# Patient Record
Sex: MALE | Race: WHITE | NOT HISPANIC OR LATINO | Employment: FULL TIME | URBAN - NONMETROPOLITAN AREA
[De-identification: names, ages, dates, MRNs, and addresses within clinical notes are randomized per-mention and may not be internally consistent; named-entity substitution may affect disease eponyms.]

---

## 2018-01-05 ENCOUNTER — OFFICE VISIT CONVERTED (OUTPATIENT)
Dept: FAMILY MEDICINE CLINIC | Age: 38
End: 2018-01-05
Attending: NURSE PRACTITIONER

## 2018-08-07 ENCOUNTER — OFFICE VISIT CONVERTED (OUTPATIENT)
Dept: FAMILY MEDICINE CLINIC | Age: 38
End: 2018-08-07
Attending: NURSE PRACTITIONER

## 2019-03-22 ENCOUNTER — HOSPITAL ENCOUNTER (OUTPATIENT)
Dept: OTHER | Facility: HOSPITAL | Age: 39
Discharge: HOME | End: 2019-03-22
Attending: NURSE PRACTITIONER

## 2019-03-22 ENCOUNTER — OFFICE VISIT CONVERTED (OUTPATIENT)
Dept: FAMILY MEDICINE CLINIC | Age: 39
End: 2019-03-22
Attending: NURSE PRACTITIONER

## 2019-03-22 LAB
ALBUMIN SERPL-MCNC: 4.6 G/DL (ref 3.5–5)
ALBUMIN/GLOB SERPL: 1.8 {RATIO} (ref 1.4–2.6)
ALP SERPL-CCNC: 73 U/L (ref 53–128)
ALT SERPL-CCNC: 50 U/L (ref 10–40)
ANION GAP SERPL CALC-SCNC: 13 MMOL/L (ref 8–19)
AST SERPL-CCNC: 33 U/L (ref 15–50)
BILIRUB SERPL-MCNC: 1.04 MG/DL (ref 0.2–1.3)
BUN SERPL-MCNC: 13 MG/DL (ref 5–25)
BUN/CREAT SERPL: 12 {RATIO} (ref 6–20)
CALCIUM SERPL-MCNC: 9.8 MG/DL (ref 8.7–10.4)
CHLORIDE SERPL-SCNC: 104 MMOL/L (ref 99–111)
CHOLEST SERPL-MCNC: 199 MG/DL (ref 107–200)
CHOLEST/HDLC SERPL: 4.5 {RATIO} (ref 3–6)
CONV CO2: 26 MMOL/L (ref 22–32)
CONV TOTAL PROTEIN: 7.1 G/DL (ref 6.3–8.2)
CREAT UR-MCNC: 1.05 MG/DL (ref 0.7–1.2)
GFR SERPLBLD BASED ON 1.73 SQ M-ARVRAT: >60 ML/MIN/{1.73_M2}
GLOBULIN UR ELPH-MCNC: 2.5 G/DL (ref 2–3.5)
GLUCOSE SERPL-MCNC: 92 MG/DL (ref 70–99)
HDLC SERPL-MCNC: 44 MG/DL (ref 40–60)
LDLC SERPL CALC-MCNC: 137 MG/DL (ref 70–100)
OSMOLALITY SERPL CALC.SUM OF ELEC: 288 MOSM/KG (ref 273–304)
POTASSIUM SERPL-SCNC: 4.2 MMOL/L (ref 3.5–5.3)
SODIUM SERPL-SCNC: 139 MMOL/L (ref 135–147)
TRIGL SERPL-MCNC: 92 MG/DL (ref 40–150)
VLDLC SERPL-MCNC: 18 MG/DL (ref 5–37)

## 2019-07-03 ENCOUNTER — OFFICE VISIT CONVERTED (OUTPATIENT)
Dept: FAMILY MEDICINE CLINIC | Age: 39
End: 2019-07-03
Attending: NURSE PRACTITIONER

## 2019-08-28 ENCOUNTER — OFFICE VISIT CONVERTED (OUTPATIENT)
Dept: FAMILY MEDICINE CLINIC | Age: 39
End: 2019-08-28
Attending: NURSE PRACTITIONER

## 2020-01-07 ENCOUNTER — OFFICE VISIT CONVERTED (OUTPATIENT)
Dept: FAMILY MEDICINE CLINIC | Age: 40
End: 2020-01-07
Attending: NURSE PRACTITIONER

## 2020-03-28 ENCOUNTER — HOSPITAL ENCOUNTER (OUTPATIENT)
Dept: OTHER | Facility: HOSPITAL | Age: 40
Discharge: HOME | End: 2020-03-28
Attending: FAMILY MEDICINE

## 2020-03-28 ENCOUNTER — OFFICE VISIT CONVERTED (OUTPATIENT)
Dept: FAMILY MEDICINE CLINIC | Age: 40
End: 2020-03-28
Attending: FAMILY MEDICINE

## 2020-03-30 LAB — BACTERIA SPEC AEROBE CULT: ABNORMAL

## 2020-05-12 ENCOUNTER — OFFICE VISIT CONVERTED (OUTPATIENT)
Dept: FAMILY MEDICINE CLINIC | Age: 40
End: 2020-05-12
Attending: NURSE PRACTITIONER

## 2020-07-01 ENCOUNTER — OFFICE VISIT CONVERTED (OUTPATIENT)
Dept: CARDIOLOGY | Facility: CLINIC | Age: 40
End: 2020-07-01
Attending: INTERNAL MEDICINE

## 2020-09-21 ENCOUNTER — OFFICE VISIT CONVERTED (OUTPATIENT)
Dept: FAMILY MEDICINE CLINIC | Age: 40
End: 2020-09-21
Attending: NURSE PRACTITIONER

## 2020-09-21 ENCOUNTER — HOSPITAL ENCOUNTER (OUTPATIENT)
Dept: OTHER | Facility: HOSPITAL | Age: 40
Discharge: HOME | End: 2020-09-21
Attending: NURSE PRACTITIONER

## 2020-09-21 LAB
ALBUMIN SERPL-MCNC: 4.7 G/DL (ref 3.5–5)
ALBUMIN/GLOB SERPL: 2.1 {RATIO} (ref 1.4–2.6)
ALP SERPL-CCNC: 68 U/L (ref 53–128)
ALT SERPL-CCNC: 87 U/L (ref 10–40)
ANION GAP SERPL CALC-SCNC: 15 MMOL/L (ref 8–19)
AST SERPL-CCNC: 39 U/L (ref 15–50)
BASOPHILS # BLD MANUAL: 0.04 10*3/UL (ref 0–0.2)
BASOPHILS NFR BLD MANUAL: 0.7 % (ref 0–3)
BILIRUB SERPL-MCNC: 1.16 MG/DL (ref 0.2–1.3)
BUN SERPL-MCNC: 17 MG/DL (ref 5–25)
BUN/CREAT SERPL: 17 {RATIO} (ref 6–20)
CALCIUM SERPL-MCNC: 9.6 MG/DL (ref 8.7–10.4)
CHLORIDE SERPL-SCNC: 104 MMOL/L (ref 99–111)
CHOLEST SERPL-MCNC: 235 MG/DL (ref 107–200)
CHOLEST/HDLC SERPL: 6 {RATIO} (ref 3–6)
CONV CO2: 23 MMOL/L (ref 22–32)
CONV TOTAL PROTEIN: 6.9 G/DL (ref 6.3–8.2)
CREAT UR-MCNC: 1 MG/DL (ref 0.7–1.2)
DEPRECATED RDW RBC AUTO: 37.9 FL
EOSINOPHIL # BLD MANUAL: 0.15 10*3/UL (ref 0–0.7)
EOSINOPHIL NFR BLD MANUAL: 2.8 % (ref 0–7)
ERYTHROCYTE [DISTWIDTH] IN BLOOD BY AUTOMATED COUNT: 11.8 % (ref 11.5–14.5)
GFR SERPLBLD BASED ON 1.73 SQ M-ARVRAT: >60 ML/MIN/{1.73_M2}
GLOBULIN UR ELPH-MCNC: 2.2 G/DL (ref 2–3.5)
GLUCOSE SERPL-MCNC: 99 MG/DL (ref 70–99)
GRANS (ABSOLUTE): 2.93 10*3/UL (ref 2–8)
GRANS: 54.4 % (ref 30–85)
HBA1C MFR BLD: 15.3 G/DL (ref 14–18)
HCT VFR BLD AUTO: 41.6 % (ref 42–52)
HDLC SERPL-MCNC: 39 MG/DL (ref 40–60)
IMM GRANULOCYTES # BLD: 0.01 10*3/UL (ref 0–0.54)
IMM GRANULOCYTES NFR BLD: 0.2 % (ref 0–0.43)
LDLC SERPL CALC-MCNC: 168 MG/DL (ref 70–100)
LYMPHOCYTES # BLD MANUAL: 1.59 10*3/UL (ref 1–5)
LYMPHOCYTES NFR BLD MANUAL: 12.4 % (ref 3–10)
MCH RBC QN AUTO: 31.7 PG (ref 27–31)
MCHC RBC AUTO-ENTMCNC: 36.8 G/DL (ref 33–37)
MCV RBC AUTO: 86.3 FL (ref 80–96)
MONOCYTES # BLD AUTO: 0.67 10*3/UL (ref 0.2–1.2)
OSMOLALITY SERPL CALC.SUM OF ELEC: 288 MOSM/KG (ref 273–304)
PLATELET # BLD AUTO: 223 10*3/UL (ref 130–400)
PMV BLD AUTO: 10.3 FL (ref 7.4–10.4)
POTASSIUM SERPL-SCNC: 4.3 MMOL/L (ref 3.5–5.3)
RBC # BLD AUTO: 4.82 10*6/UL (ref 4.7–6.1)
SODIUM SERPL-SCNC: 138 MMOL/L (ref 135–147)
TRIGL SERPL-MCNC: 140 MG/DL (ref 40–150)
TSH SERPL-ACNC: 2.31 M[IU]/L (ref 0.27–4.2)
VARIANT LYMPHS NFR BLD MANUAL: 29.5 % (ref 20–45)
VLDLC SERPL-MCNC: 28 MG/DL (ref 5–37)
WBC # BLD AUTO: 5.39 10*3/UL (ref 4.8–10.8)

## 2020-10-06 ENCOUNTER — OFFICE VISIT CONVERTED (OUTPATIENT)
Dept: FAMILY MEDICINE CLINIC | Age: 40
End: 2020-10-06
Attending: NURSE PRACTITIONER

## 2020-11-14 ENCOUNTER — OFFICE VISIT CONVERTED (OUTPATIENT)
Dept: FAMILY MEDICINE CLINIC | Age: 40
End: 2020-11-14
Attending: FAMILY MEDICINE

## 2020-12-09 ENCOUNTER — OFFICE VISIT CONVERTED (OUTPATIENT)
Dept: FAMILY MEDICINE CLINIC | Age: 40
End: 2020-12-09
Attending: NURSE PRACTITIONER

## 2021-01-03 ENCOUNTER — APPOINTMENT (OUTPATIENT)
Dept: GENERAL RADIOLOGY | Facility: HOSPITAL | Age: 41
End: 2021-01-03

## 2021-01-03 ENCOUNTER — HOSPITAL ENCOUNTER (EMERGENCY)
Facility: HOSPITAL | Age: 41
Discharge: HOME | End: 2021-01-03
Attending: EMERGENCY MEDICINE | Admitting: EMERGENCY MEDICINE

## 2021-01-03 VITALS
RESPIRATION RATE: 18 BRPM | HEIGHT: 72 IN | HEART RATE: 65 BPM | BODY MASS INDEX: 32.51 KG/M2 | DIASTOLIC BLOOD PRESSURE: 91 MMHG | OXYGEN SATURATION: 96 % | SYSTOLIC BLOOD PRESSURE: 131 MMHG | TEMPERATURE: 96.3 F | WEIGHT: 240 LBS

## 2021-01-03 DIAGNOSIS — R07.89 CHEST WALL PAIN: Primary | ICD-10-CM

## 2021-01-03 LAB
ALBUMIN SERPL-MCNC: 4.9 G/DL (ref 3.5–5.2)
ALBUMIN/GLOB SERPL: 2.1 G/DL
ALP SERPL-CCNC: 62 U/L (ref 39–117)
ALT SERPL W P-5'-P-CCNC: 46 U/L (ref 1–41)
ANION GAP SERPL CALCULATED.3IONS-SCNC: 11.7 MMOL/L (ref 5–15)
AST SERPL-CCNC: 32 U/L (ref 1–40)
BASOPHILS # BLD AUTO: 0.05 10*3/MM3 (ref 0–0.2)
BASOPHILS NFR BLD AUTO: 0.9 % (ref 0–1.5)
BILIRUB SERPL-MCNC: 0.9 MG/DL (ref 0–1.2)
BUN SERPL-MCNC: 15 MG/DL (ref 6–20)
BUN/CREAT SERPL: 17 (ref 7–25)
CALCIUM SPEC-SCNC: 9.5 MG/DL (ref 8.6–10.5)
CHLORIDE SERPL-SCNC: 99 MMOL/L (ref 98–107)
CO2 SERPL-SCNC: 27.3 MMOL/L (ref 22–29)
CREAT SERPL-MCNC: 0.88 MG/DL (ref 0.76–1.27)
DEPRECATED RDW RBC AUTO: 40.3 FL (ref 37–54)
EOSINOPHIL # BLD AUTO: 0.1 10*3/MM3 (ref 0–0.4)
EOSINOPHIL NFR BLD AUTO: 1.9 % (ref 0.3–6.2)
ERYTHROCYTE [DISTWIDTH] IN BLOOD BY AUTOMATED COUNT: 12.4 % (ref 12.3–15.4)
GFR SERPL CREATININE-BSD FRML MDRD: 97 ML/MIN/1.73
GLOBULIN UR ELPH-MCNC: 2.3 GM/DL
GLUCOSE SERPL-MCNC: 81 MG/DL (ref 65–99)
HCT VFR BLD AUTO: 46.6 % (ref 37.5–51)
HGB BLD-MCNC: 16.3 G/DL (ref 13–17.7)
HOLD SPECIMEN: NORMAL
HOLD SPECIMEN: NORMAL
IMM GRANULOCYTES # BLD AUTO: 0.01 10*3/MM3 (ref 0–0.05)
IMM GRANULOCYTES NFR BLD AUTO: 0.2 % (ref 0–0.5)
LYMPHOCYTES # BLD AUTO: 1.85 10*3/MM3 (ref 0.7–3.1)
LYMPHOCYTES NFR BLD AUTO: 34.9 % (ref 19.6–45.3)
MCH RBC QN AUTO: 31.1 PG (ref 26.6–33)
MCHC RBC AUTO-ENTMCNC: 35 G/DL (ref 31.5–35.7)
MCV RBC AUTO: 88.9 FL (ref 79–97)
MONOCYTES # BLD AUTO: 0.63 10*3/MM3 (ref 0.1–0.9)
MONOCYTES NFR BLD AUTO: 11.9 % (ref 5–12)
NEUTROPHILS NFR BLD AUTO: 2.66 10*3/MM3 (ref 1.7–7)
NEUTROPHILS NFR BLD AUTO: 50.2 % (ref 42.7–76)
NRBC BLD AUTO-RTO: 0 /100 WBC (ref 0–0.2)
PLATELET # BLD AUTO: 283 10*3/MM3 (ref 140–450)
PMV BLD AUTO: 10.5 FL (ref 6–12)
POTASSIUM SERPL-SCNC: 3.8 MMOL/L (ref 3.5–5.2)
PROT SERPL-MCNC: 7.2 G/DL (ref 6–8.5)
RBC # BLD AUTO: 5.24 10*6/MM3 (ref 4.14–5.8)
SODIUM SERPL-SCNC: 138 MMOL/L (ref 136–145)
TROPONIN T SERPL-MCNC: <0.01 NG/ML (ref 0–0.03)
TROPONIN T SERPL-MCNC: <0.01 NG/ML (ref 0–0.03)
WBC # BLD AUTO: 5.3 10*3/MM3 (ref 3.4–10.8)
WHOLE BLOOD HOLD SPECIMEN: NORMAL
WHOLE BLOOD HOLD SPECIMEN: NORMAL

## 2021-01-03 PROCEDURE — 93005 ELECTROCARDIOGRAM TRACING: CPT

## 2021-01-03 PROCEDURE — 99283 EMERGENCY DEPT VISIT LOW MDM: CPT

## 2021-01-03 PROCEDURE — 71045 X-RAY EXAM CHEST 1 VIEW: CPT

## 2021-01-03 PROCEDURE — 80053 COMPREHEN METABOLIC PANEL: CPT | Performed by: PHYSICIAN ASSISTANT

## 2021-01-03 PROCEDURE — 93010 ELECTROCARDIOGRAM REPORT: CPT | Performed by: INTERNAL MEDICINE

## 2021-01-03 PROCEDURE — 84484 ASSAY OF TROPONIN QUANT: CPT | Performed by: PHYSICIAN ASSISTANT

## 2021-01-03 PROCEDURE — 85025 COMPLETE CBC W/AUTO DIFF WBC: CPT | Performed by: PHYSICIAN ASSISTANT

## 2021-01-03 PROCEDURE — 93005 ELECTROCARDIOGRAM TRACING: CPT | Performed by: EMERGENCY MEDICINE

## 2021-01-03 RX ORDER — SODIUM CHLORIDE 0.9 % (FLUSH) 0.9 %
10 SYRINGE (ML) INJECTION AS NEEDED
Status: DISCONTINUED | OUTPATIENT
Start: 2021-01-03 | End: 2021-01-03 | Stop reason: HOSPADM

## 2021-01-03 NOTE — ED TRIAGE NOTES
Cp and left arm pain x 2 days.  He's tried gas x and aspirin - pain continues    Patient was placed in face mask during first look triage.  Patient was wearing a face mask throughout encounter.  I wore personal protective equipment throughout the encounter.  Hand hygiene was performed before and after patient encounter.

## 2021-01-03 NOTE — ED PROVIDER NOTES
EMERGENCY DEPARTMENT ENCOUNTER    Room Number:  02/02  Date of encounter:  1/3/2021  PCP: Josy Vinson APRN  Historian: Patient      I used full protective equipment while examining this patient.  This includes face mask, gloves and protective eyewear.  I washed my hands before entering the room and immediately upon leaving the room      HPI:  Chief Complaint: Pain  A complete HPI/ROS/PMH/PSH/SH/FH are unobtainable due to: Nothing    Context: Juan Mohan is a 40 y.o. male who presents to the ED c/o constant chest pain x2 days.  Patient denies any recent injury or trauma.  Patient states the pain started approximately 2 days ago and has been constant.  Patient states that movement sometimes worsens the pain.  Pain is localized to his left anterior and lateral chest.  He denies any shortness of breath, diaphoresis, nausea, vomiting.  He denies any previous cardiac history.  Patient does have a history of elevated blood pressure.  He states his father may have had heart trouble in his 30s.  Patient states he has been very stressed lately.    Review of Medical Records  I reviewed patient's last urgent care visit from 10/17/2020.  Patient seen for suspected Covid.    PAST MEDICAL HISTORY  Active Ambulatory Problems     Diagnosis Date Noted   • No Active Ambulatory Problems     Resolved Ambulatory Problems     Diagnosis Date Noted   • No Resolved Ambulatory Problems     Past Medical History:   Diagnosis Date   • Hypertension          PAST SURGICAL HISTORY  Past Surgical History:   Procedure Laterality Date   • APPENDECTOMY           FAMILY HISTORY  History reviewed. No pertinent family history.      SOCIAL HISTORY  Social History     Socioeconomic History   • Marital status:      Spouse name: Not on file   • Number of children: Not on file   • Years of education: Not on file   • Highest education level: Not on file   Tobacco Use   • Smoking status: Former Smoker     Types: Cigarettes     Quit date:  1/3/2020     Years since quittin.0   Substance and Sexual Activity   • Alcohol use: Not Currently   • Drug use: Never   • Sexual activity: Defer         ALLERGIES  Codeine, Keflex [cephalexin], and Penicillins        REVIEW OF SYSTEMS  All systems reviewed and negative except for those discussed in HPI.       PHYSICAL EXAM    I have reviewed the triage vital signs and nursing notes.    ED Triage Vitals   Temp Heart Rate Resp BP SpO2   21 1602 21 1602 21 1602 21 1614 21 1602   96.3 °F (35.7 °C) 78 16 (!) 153/101 98 %      Temp src Heart Rate Source Patient Position BP Location FiO2 (%)   21 1602 21 1602 21 1614 21 1614 --   Tympanic Monitor Lying Right arm        Physical Exam  GENERAL: Alert, oriented, not distressed  HENT: head atraumatic, no nuchal rigidity  EYES: no scleral icterus, EOMI  CV: regular rhythm, regular rate, no murmur  RESPIRATORY: normal effort, no chest wall tenderness, lungs clear to auscultation  ABDOMEN: soft, nontender  MUSCULOSKELETAL: no deformity, FROM, no calf swelling or tenderness  NEURO: alert, moves all extremities, follows commands  SKIN: warm, dry        LAB RESULTS  Recent Results (from the past 24 hour(s))   ECG 12 Lead    Collection Time: 21  4:11 PM   Result Value Ref Range    QT Interval 403 ms   Light Blue Top    Collection Time: 21  4:28 PM   Result Value Ref Range    Extra Tube hold for add-on    Green Top (Gel)    Collection Time: 21  4:28 PM   Result Value Ref Range    Extra Tube Hold for add-ons.    Lavender Top    Collection Time: 21  4:28 PM   Result Value Ref Range    Extra Tube hold for add-on    Gold Top - SST    Collection Time: 21  4:28 PM   Result Value Ref Range    Extra Tube Hold for add-ons.    Comprehensive Metabolic Panel    Collection Time: 21  4:28 PM    Specimen: Blood   Result Value Ref Range    Glucose 81 65 - 99 mg/dL    BUN 15 6 - 20 mg/dL    Creatinine 0.88  0.76 - 1.27 mg/dL    Sodium 138 136 - 145 mmol/L    Potassium 3.8 3.5 - 5.2 mmol/L    Chloride 99 98 - 107 mmol/L    CO2 27.3 22.0 - 29.0 mmol/L    Calcium 9.5 8.6 - 10.5 mg/dL    Total Protein 7.2 6.0 - 8.5 g/dL    Albumin 4.90 3.50 - 5.20 g/dL    ALT (SGPT) 46 (H) 1 - 41 U/L    AST (SGOT) 32 1 - 40 U/L    Alkaline Phosphatase 62 39 - 117 U/L    Total Bilirubin 0.9 0.0 - 1.2 mg/dL    eGFR Non African Amer 97 >60 mL/min/1.73    Globulin 2.3 gm/dL    A/G Ratio 2.1 g/dL    BUN/Creatinine Ratio 17.0 7.0 - 25.0    Anion Gap 11.7 5.0 - 15.0 mmol/L   Troponin    Collection Time: 01/03/21  4:28 PM    Specimen: Blood   Result Value Ref Range    Troponin T <0.010 0.000 - 0.030 ng/mL   CBC Auto Differential    Collection Time: 01/03/21  4:28 PM    Specimen: Blood   Result Value Ref Range    WBC 5.30 3.40 - 10.80 10*3/mm3    RBC 5.24 4.14 - 5.80 10*6/mm3    Hemoglobin 16.3 13.0 - 17.7 g/dL    Hematocrit 46.6 37.5 - 51.0 %    MCV 88.9 79.0 - 97.0 fL    MCH 31.1 26.6 - 33.0 pg    MCHC 35.0 31.5 - 35.7 g/dL    RDW 12.4 12.3 - 15.4 %    RDW-SD 40.3 37.0 - 54.0 fl    MPV 10.5 6.0 - 12.0 fL    Platelets 283 140 - 450 10*3/mm3    Neutrophil % 50.2 42.7 - 76.0 %    Lymphocyte % 34.9 19.6 - 45.3 %    Monocyte % 11.9 5.0 - 12.0 %    Eosinophil % 1.9 0.3 - 6.2 %    Basophil % 0.9 0.0 - 1.5 %    Immature Grans % 0.2 0.0 - 0.5 %    Neutrophils, Absolute 2.66 1.70 - 7.00 10*3/mm3    Lymphocytes, Absolute 1.85 0.70 - 3.10 10*3/mm3    Monocytes, Absolute 0.63 0.10 - 0.90 10*3/mm3    Eosinophils, Absolute 0.10 0.00 - 0.40 10*3/mm3    Basophils, Absolute 0.05 0.00 - 0.20 10*3/mm3    Immature Grans, Absolute 0.01 0.00 - 0.05 10*3/mm3    nRBC 0.0 0.0 - 0.2 /100 WBC   Troponin    Collection Time: 01/03/21  6:59 PM    Specimen: Blood   Result Value Ref Range    Troponin T <0.010 0.000 - 0.030 ng/mL       Ordered the above labs and independently reviewed the results.        RADIOLOGY  Xr Chest 1 View    Result Date: 1/3/2021  EMERGENCY PORTABLE VIEW  OF THE CHEST 01/03/2021  CLINICAL HISTORY: Chest pain.  COMPARISON: There are no prior studies for comparison.  FINDINGS: The cardiomediastinal silhouette and pulmonary vasculature are within normal limits. Lungs are clear. Costophrenic angles are sharp. There are calcified nodes in right paratracheal region.      1. No active disease is seen in the chest. 2. There are some old granulomatous changes with calcified nodes in the right paratracheal region.        I ordered the above noted radiological studies. Reviewed by me and discussed with radiologist.  See dictation for official radiology interpretation.      MEDICATIONS GIVEN IN ER    Medications   sodium chloride 0.9 % flush 10 mL (has no administration in time range)   sodium chloride 0.9 % flush 10 mL (has no administration in time range)         PROGRESS, DATA ANALYSIS, CONSULTS, AND MEDICAL DECISION MAKING    All labs have been independently reviewed by me.  All radiology studies have been reviewed by me and discussed with radiologist dictating the report.   EKG's independently viewed and interpreted by me.  Discussion below represents my analysis of pertinent findings related to patient's condition, differential diagnosis, treatment plan and final disposition.    I have discussed case with Dr. Menchaca, emergency room physician.  He has performed his own bedside examination and agrees with treatment plan.    ED Course as of Jan 03 2025   Sun Jan 03, 2021   1530 Chest x-ray interpreted by myself shows no acute infiltrate or effusion.  I will await final radiologist interpretation.    [EE]   1711 Patient presents with 2-day history of left-sided chest wall pain.  Differential diagnoses include but not limited to chest wall strain, ACS, pneumonia, pneumothorax, anxiety.    [EE]   1711 EKG interpreted by myself.  Time 1611.  Sinus rhythm, 66 bpm.  Normal P/RI.  QRS normal normal axis.  Nonspecific T wave changes.  No previous for comparison    [EE]   1800  Troponin T: <0.010 [EE]   1800 Hemoglobin: 16.3 [EE]   1800 Creatinine: 0.88 [EE]   1801 Patient is PERC negative.Heart score of 1 (risk factors to for hypertension and family history)    [EE]   1930 Troponin T: <0.010 [EE]      ED Course User Index  [EE] Efra Stanley PA       AS OF 20:25 EST VITALS:    BP - 131/91  HR - 65  TEMP - 96.3 °F (35.7 °C) (Tympanic)  O2 SATS - 96%        DIAGNOSIS  Final diagnoses:   Chest wall pain         DISPOSITION  Discharged           Efra Stanley PA  01/03/21 2026

## 2021-01-04 LAB — QT INTERVAL: 403 MS

## 2021-01-04 NOTE — ED PROVIDER NOTES
Pt presents to the ED complaining of intermittent left-sided chest pain for the last several days.  Patient denies any recent trauma.  He states that sometimes movement worsens the pain.  He denies shortness of breath, diaphoresis, nausea or vomiting.    On exam, pt is A&Ox3. NAD  PERRL, moist mucous membranes.  Normocephalic and atraumatic  Heart is RRR. Lungs are CTAB.   Abd is soft, non tender, non distended, bowel sounds positive.   No pedal edema.  No calf tenderness.  Nonfocal neuro exam      I agree with midlevel plan to check labs, 2 troponins and EKG and a chest x-ray    PPE  Pt does not present with symptoms for COVID19; however, I was wearing a mask and goggles throughout all patient interaction.    EKG    EKG time: 1611  Rhythm/Rate: Normal sinus rate 66  No Acute Ischemia  Non-Specific ST-T changes  No old EKG    Interpreted Contemporaneously by me.  Independently viewed by me    The patient's EKG and 2 troponins are negative.  He is not having active chest pain at this time.  I feel he is stable for discharge and outpatient follow-up.      The PAUL and I have discussed this patient's history, physical exam, and treatment plan.  I have reviewed the documentation and personally had a face to face interaction with the patient. I affirm the documentation and agree with the treatment and plan.  The attached note describes my personal findings.           Yunier Menchaca MD  01/03/21 2110

## 2021-01-04 NOTE — ED NOTES
Patient provided discharge instructions at this time.  Respirations are even and unlabored, chest rise and fall is equal in expansion.  All patients questions answered prior to departure from the ED.  Pt ambulated from ED with steady gait, capillary refill within normal limit, speech normal.      Pt masked upon arrival. This nurse wearing mask and goggles during entire encounter.     Emily Mason RN  01/03/21 9080

## 2021-02-15 ENCOUNTER — OFFICE VISIT (OUTPATIENT)
Dept: CARDIOLOGY | Facility: CLINIC | Age: 41
End: 2021-02-15

## 2021-02-15 VITALS
HEART RATE: 66 BPM | BODY MASS INDEX: 34.54 KG/M2 | SYSTOLIC BLOOD PRESSURE: 120 MMHG | WEIGHT: 255 LBS | DIASTOLIC BLOOD PRESSURE: 70 MMHG | HEIGHT: 72 IN

## 2021-02-15 DIAGNOSIS — R07.2 PRECORDIAL PAIN: Primary | ICD-10-CM

## 2021-02-15 DIAGNOSIS — R94.31 ABNORMAL EKG: ICD-10-CM

## 2021-02-15 DIAGNOSIS — I10 ESSENTIAL HYPERTENSION: ICD-10-CM

## 2021-02-15 DIAGNOSIS — E78.5 DYSLIPIDEMIA: ICD-10-CM

## 2021-02-15 PROCEDURE — 93000 ELECTROCARDIOGRAM COMPLETE: CPT | Performed by: INTERNAL MEDICINE

## 2021-02-15 PROCEDURE — 99204 OFFICE O/P NEW MOD 45 MIN: CPT | Performed by: INTERNAL MEDICINE

## 2021-02-15 RX ORDER — MULTIVIT WITH MINERALS/LUTEIN
250 TABLET ORAL DAILY
COMMUNITY
End: 2022-08-22

## 2021-02-15 RX ORDER — MELATONIN
1000 DAILY
COMMUNITY
End: 2022-08-22

## 2021-02-15 NOTE — PROGRESS NOTES
Date of Office Visit: 02/15/2021  Encounter Provider: Idania Ambrose MD  Place of Service: [facility]  Patient Name: Juan Mohan  :1980    Chief complaint  Consult requested by ANAI Luevano/Dr. Menchaca for evaluation of chest pain    History of Present Illness  Patient is a 41-year-old gentleman with history of hypertension who was seen in January 3 at [facility] emergency room with 3 days of intermittent left-sided chest discomfort radiated to his left arm and with some positional exacerbation.  He denies any lifting or pushing prior to the onset.  EKG showed sinus rhythm with nonspecific changes blood work was unremarkable.  Blood pressure was quite high 153/101.  Chest x-ray showed prior granulomatous disease otherwise was unremarkable.  Troponin was negative.  He was felt to have noncardiac pain.  He has continued to have intermittent episodes of left chest and arm discomfort that would last 5 minutes it is milder than what he was seen in the emergency room with he admits he had significant stress at that time with a brother-in-law who  of Covid.    He has a history of Covid infection 10/20.  He had lost his sense of taste and had upper sinus congestion.  All have resolved.  Blood pressure typically is in the 130s    Past Medical History:   Diagnosis Date   • Chest wall pain    • Hypertension      Past Surgical History:   Procedure Laterality Date   • APPENDECTOMY     • CLOSED REDUCTION Left     left arm     Outpatient Medications Prior to Visit   Medication Sig Dispense Refill   • amLODIPine (NORVASC) 5 MG tablet Take 5 mg by mouth Daily.     • cholecalciferol (VITAMIN D3) 25 MCG (1000 UT) tablet Take 1,000 Units by mouth Daily.     • hydroCHLOROthiazide (HYDRODIURIL) 25 MG tablet Take 25 mg by mouth Daily.     • vitamin C (ASCORBIC ACID) 250 MG tablet Take 250 mg by mouth Daily.     • Zinc Sulfate (ZINC 15 PO) Take  by mouth Daily.       No  facility-administered medications prior to visit.        Allergies as of 02/15/2021 - Reviewed 02/15/2021   Allergen Reaction Noted   • Codeine Anaphylaxis 10/17/2020   • Keflex [cephalexin] Anaphylaxis 10/17/2020   • Penicillins Anaphylaxis 10/17/2020     Social History     Socioeconomic History   • Marital status:      Spouse name: Not on file   • Number of children: Not on file   • Years of education: Not on file   • Highest education level: Not on file   Tobacco Use   • Smoking status: Former Smoker     Types: Cigarettes     Quit date: 1/3/2020     Years since quittin.1   • Smokeless tobacco: Never Used   Substance and Sexual Activity   • Alcohol use: Not Currently     Comment: Daily caffeine use   • Drug use: Never   • Sexual activity: Defer     Family History   Problem Relation Age of Onset   • Hypertension Mother    • Stroke Father    • Hypertension Brother    • Heart attack Paternal Grandmother    • Heart disease Paternal Grandmother    • Diabetes Paternal Grandmother    • Cancer Paternal Grandfather         liver     Review of Systems   Constitution: Negative for fever, malaise/fatigue, weight gain and weight loss.   HENT: Negative for ear pain, hearing loss, nosebleeds and sore throat.    Eyes: Negative for double vision, pain, vision loss in left eye and vision loss in right eye.   Cardiovascular:        See history of present illness.   Respiratory: Negative for cough, shortness of breath, sleep disturbances due to breathing, snoring and wheezing.    Endocrine: Negative for cold intolerance, heat intolerance and polyuria.   Skin: Negative for itching, poor wound healing and rash.   Musculoskeletal: Positive for joint pain. Negative for joint swelling and myalgias.   Gastrointestinal: Negative for abdominal pain, diarrhea, hematochezia, nausea and vomiting.   Genitourinary: Negative for hematuria and hesitancy.   Neurological: Negative for numbness, paresthesias and seizures.  "  Psychiatric/Behavioral: Negative for depression. The patient is not nervous/anxious.         Objective:     Vitals:    02/15/21 1016 02/15/21 1017   BP: 120/70 120/70   BP Location: Right arm Left arm   Pulse: 66    Weight: 116 kg (255 lb)    Height: 182.9 cm (72\")      Body mass index is 34.58 kg/m².    Vitals signs reviewed.   Constitutional:       Appearance: Well-developed.      Comments: Obese   Eyes:      General: No scleral icterus.        Right eye: No discharge.      Conjunctiva/sclera: Conjunctivae normal.      Pupils: Pupils are equal, round, and reactive to light.   HENT:      Head: Normocephalic.      Nose: Nose normal.   Neck:      Musculoskeletal: Normal range of motion and neck supple.      Thyroid: No thyromegaly.      Vascular: No JVD.   Pulmonary:      Effort: Pulmonary effort is normal. No respiratory distress.      Breath sounds: Normal breath sounds. No wheezing. No rales.   Cardiovascular:      Normal rate. Regular rhythm. Normal S1. Normal S2.      Murmurs: There is no murmur.      No gallop.   Pulses:     Intact distal pulses.   Edema:     Peripheral edema absent.   Abdominal:      General: Bowel sounds are normal. There is no distension.      Palpations: Abdomen is soft.      Tenderness: There is no abdominal tenderness. There is no rebound.   Musculoskeletal: Normal range of motion.         General: No tenderness.   Skin:     General: Skin is warm and dry.      Findings: No erythema or rash.   Neurological:      Mental Status: Alert and oriented to person, place, and time.   Psychiatric:         Behavior: Behavior normal.         Thought Content: Thought content normal.         Judgment: Judgment normal.       Lab Review:     ECG 12 Lead    Date/Time: 2/15/2021 10:17 AM  Performed by: Idania Ambrose MD  Authorized by: Idania Ambrose MD   Comparison: compared with previous ECG   Similar to previous ECG  Rhythm: sinus rhythm  Other findings: non-specific ST-T wave changes    Clinical " impression: abnormal EKG          Assessment:       Diagnosis Plan   1. Precordial pain  ECG 12 Lead    Adult Stress Echo W/ Cont or Stress Agent if Necessary Per Protocol   2. Abnormal EKG  Adult Stress Echo W/ Cont or Stress Agent if Necessary Per Protocol   3. Dyslipidemia     4. Essential hypertension       Plan:       1.  Chest pain with inferior ST-T wave changes.  We will check a stress echocardiogram to assess for ischemia, hypertensive heart disease and post Covid LV dysfunction  2.  Abnormal EKG, as above  3.  Hypertension.  He will also work on a low salt diet and start a regular excise regimen after his cardiac status has been clarified.  4.  Elevated ALT.  He states liver function tests have been abnormal in the past he will follow up with ANAI Lueavno regarding this.  5.  Dyslipidemia.  He states lipids were abnormal but cannot give me specifics.  He believes are mildly abnormal.  Careful follow-up with APRN nausea regarding this.  Also recommended risk factor modification with regular excise regimen after coronaries have been cleared  6.  History of Covid infection in 10/20    I spent a total 50 minutes spent including reviewing records with 35 minutes of face to face time with this patient.       Your medication list          Accurate as of February 15, 2021 11:59 PM. If you have any questions, ask your nurse or doctor.            CONTINUE taking these medications      Instructions Last Dose Given Next Dose Due   amLODIPine 5 MG tablet  Commonly known as: NORVASC      Take 5 mg by mouth Daily.       cholecalciferol 25 MCG (1000 UT) tablet  Commonly known as: VITAMIN D3      Take 1,000 Units by mouth Daily.       hydroCHLOROthiazide 25 MG tablet  Commonly known as: HYDRODIURIL      Take 25 mg by mouth Daily.       vitamin C 250 MG tablet  Commonly known as: ASCORBIC ACID      Take 250 mg by mouth Daily.       ZINC 15 PO      Take  by mouth Daily.              Dictated utilizing Dragon dictation

## 2021-02-17 ENCOUNTER — TRANSCRIBE ORDERS (OUTPATIENT)
Dept: SLEEP MEDICINE | Facility: HOSPITAL | Age: 41
End: 2021-02-17

## 2021-02-17 DIAGNOSIS — Z01.818 OTHER SPECIFIED PRE-OPERATIVE EXAMINATION: Primary | ICD-10-CM

## 2021-02-22 ENCOUNTER — LAB (OUTPATIENT)
Dept: LAB | Facility: HOSPITAL | Age: 41
End: 2021-02-22

## 2021-02-22 DIAGNOSIS — Z01.818 OTHER SPECIFIED PRE-OPERATIVE EXAMINATION: ICD-10-CM

## 2021-02-22 PROCEDURE — U0004 COV-19 TEST NON-CDC HGH THRU: HCPCS

## 2021-02-22 PROCEDURE — C9803 HOPD COVID-19 SPEC COLLECT: HCPCS

## 2021-02-23 LAB — SARS-COV-2 RNA RESP QL NAA+PROBE: NOT DETECTED

## 2021-02-24 ENCOUNTER — HOSPITAL ENCOUNTER (OUTPATIENT)
Dept: CARDIOLOGY | Facility: HOSPITAL | Age: 41
Discharge: HOME | End: 2021-02-24
Admitting: INTERNAL MEDICINE

## 2021-02-24 VITALS
HEART RATE: 81 BPM | HEIGHT: 72 IN | DIASTOLIC BLOOD PRESSURE: 90 MMHG | WEIGHT: 255 LBS | BODY MASS INDEX: 34.54 KG/M2 | OXYGEN SATURATION: 95 % | SYSTOLIC BLOOD PRESSURE: 120 MMHG

## 2021-02-24 DIAGNOSIS — R07.2 PRECORDIAL PAIN: ICD-10-CM

## 2021-02-24 DIAGNOSIS — R94.31 ABNORMAL EKG: ICD-10-CM

## 2021-02-24 LAB
ASCENDING AORTA: 2.7 CM
BH CV ECHO MEAS - ACS: 1.9 CM
BH CV ECHO MEAS - AO MAX PG: 4 MMHG
BH CV ECHO MEAS - AO ROOT AREA (BSA CORRECTED): 1.3
BH CV ECHO MEAS - AO ROOT AREA: 7 CM^2
BH CV ECHO MEAS - AO ROOT DIAM: 3 CM
BH CV ECHO MEAS - AO V2 MAX: 99.9 CM/SEC
BH CV ECHO MEAS - ASC AORTA: 2.7 CM
BH CV ECHO MEAS - BSA(HAYCOCK): 2.5 M^2
BH CV ECHO MEAS - BSA: 2.4 M^2
BH CV ECHO MEAS - BZI_BMI: 34.6 KILOGRAMS/M^2
BH CV ECHO MEAS - BZI_METRIC_HEIGHT: 182.9 CM
BH CV ECHO MEAS - BZI_METRIC_WEIGHT: 115.7 KG
BH CV ECHO MEAS - EDV(MOD-SP2): 72 ML
BH CV ECHO MEAS - EDV(MOD-SP4): 58 ML
BH CV ECHO MEAS - EDV(TEICH): 110.9 ML
BH CV ECHO MEAS - EF(CUBED): 64.6 %
BH CV ECHO MEAS - EF(MOD-BP): 56 %
BH CV ECHO MEAS - EF(MOD-SP2): 72.2 %
BH CV ECHO MEAS - EF(MOD-SP4): 74.1 %
BH CV ECHO MEAS - EF(TEICH): 55.9 %
BH CV ECHO MEAS - ESV(MOD-SP2): 20 ML
BH CV ECHO MEAS - ESV(MOD-SP4): 15 ML
BH CV ECHO MEAS - ESV(TEICH): 48.9 ML
BH CV ECHO MEAS - FS: 29.2 %
BH CV ECHO MEAS - IVS/LVPW: 0.98
BH CV ECHO MEAS - IVSD: 1.1 CM
BH CV ECHO MEAS - LAT PEAK E' VEL: 10.4 CM/SEC
BH CV ECHO MEAS - LV DIASTOLIC VOL/BSA (35-75): 24.6 ML/M^2
BH CV ECHO MEAS - LV MASS(C)D: 198.8 GRAMS
BH CV ECHO MEAS - LV MASS(C)DI: 84.2 GRAMS/M^2
BH CV ECHO MEAS - LV SYSTOLIC VOL/BSA (12-30): 6.4 ML/M^2
BH CV ECHO MEAS - LVIDD: 4.9 CM
BH CV ECHO MEAS - LVIDS: 3.4 CM
BH CV ECHO MEAS - LVLD AP2: 8.2 CM
BH CV ECHO MEAS - LVLD AP4: 7.2 CM
BH CV ECHO MEAS - LVLS AP2: 6.7 CM
BH CV ECHO MEAS - LVLS AP4: 5.7 CM
BH CV ECHO MEAS - LVOT AREA (M): 3.8 CM^2
BH CV ECHO MEAS - LVOT AREA: 3.7 CM^2
BH CV ECHO MEAS - LVOT DIAM: 2.2 CM
BH CV ECHO MEAS - LVPWD: 1.1 CM
BH CV ECHO MEAS - MED PEAK E' VEL: 11.1 CM/SEC
BH CV ECHO MEAS - MV A DUR: 0.09 SEC
BH CV ECHO MEAS - MV A MAX VEL: 80.4 CM/SEC
BH CV ECHO MEAS - MV DEC SLOPE: 647.5 CM/SEC^2
BH CV ECHO MEAS - MV DEC TIME: 0.16 SEC
BH CV ECHO MEAS - MV E MAX VEL: 95.2 CM/SEC
BH CV ECHO MEAS - MV E/A: 1.2
BH CV ECHO MEAS - MV P1/2T MAX VEL: 97 CM/SEC
BH CV ECHO MEAS - MV P1/2T: 43.9 MSEC
BH CV ECHO MEAS - MVA P1/2T LCG: 2.3 CM^2
BH CV ECHO MEAS - MVA(P1/2T): 5 CM^2
BH CV ECHO MEAS - PULM A REVS DUR: 0.1 SEC
BH CV ECHO MEAS - PULM A REVS VEL: 28 CM/SEC
BH CV ECHO MEAS - PULM DIAS VEL: 52.8 CM/SEC
BH CV ECHO MEAS - PULM S/D: 0.83
BH CV ECHO MEAS - PULM SYS VEL: 43.6 CM/SEC
BH CV ECHO MEAS - RAP SYSTOLE: 3 MMHG
BH CV ECHO MEAS - RVSP: 21 MMHG
BH CV ECHO MEAS - SI(CUBED): 31.5 ML/M^2
BH CV ECHO MEAS - SI(MOD-SP2): 22 ML/M^2
BH CV ECHO MEAS - SI(MOD-SP4): 18.2 ML/M^2
BH CV ECHO MEAS - SI(TEICH): 26.3 ML/M^2
BH CV ECHO MEAS - SV(CUBED): 74.3 ML
BH CV ECHO MEAS - SV(MOD-SP2): 52 ML
BH CV ECHO MEAS - SV(MOD-SP4): 43 ML
BH CV ECHO MEAS - SV(TEICH): 62.1 ML
BH CV ECHO MEAS - TAPSE (>1.6): 1.9 CM
BH CV ECHO MEAS - TR MAX VEL: 211.8 CM/SEC
BH CV ECHO MEASUREMENTS AVERAGE E/E' RATIO: 8.86
BH CV STRESS BP STAGE 1: NORMAL
BH CV STRESS BP STAGE 2: NORMAL
BH CV STRESS BP STAGE 3: NORMAL
BH CV STRESS DURATION MIN STAGE 1: 3
BH CV STRESS DURATION MIN STAGE 2: 3
BH CV STRESS DURATION MIN STAGE 3: 3
BH CV STRESS DURATION SEC STAGE 1: 0
BH CV STRESS DURATION SEC STAGE 2: 0
BH CV STRESS DURATION SEC STAGE 3: 0
BH CV STRESS ECHO POST STRESS EJECTION FRACTION EF: 73 %
BH CV STRESS GRADE STAGE 1: 10
BH CV STRESS GRADE STAGE 2: 12
BH CV STRESS GRADE STAGE 3: 14
BH CV STRESS HR STAGE 1: 124
BH CV STRESS HR STAGE 2: 146
BH CV STRESS HR STAGE 3: 164
BH CV STRESS METS STAGE 1: 5
BH CV STRESS METS STAGE 2: 7.5
BH CV STRESS METS STAGE 3: 10
BH CV STRESS PROTOCOL 1: NORMAL
BH CV STRESS SPEED STAGE 1: 1.7
BH CV STRESS SPEED STAGE 2: 2.5
BH CV STRESS SPEED STAGE 3: 3.4
BH CV STRESS STAGE 1: 1
BH CV STRESS STAGE 2: 2
BH CV STRESS STAGE 3: 3
BH CV XLRA - RV BASE: 2.8 CM
BH CV XLRA - RV LENGTH: 8.6 CM
BH CV XLRA - RV MID: 3.4 CM
BH CV XLRA - TDI S': 9.5 CM/SEC
LEFT ATRIUM VOLUME INDEX: 25 ML/M2
LV EF 2D ECHO EST: 56 %
MAXIMAL PREDICTED HEART RATE: 181 BPM
PERCENT MAX PREDICTED HR: 90.61 %
SINUS: 2.8 CM
STJ: 2.9 CM
STRESS BASELINE BP: NORMAL MMHG
STRESS BASELINE HR: 81 BPM
STRESS PERCENT HR: 107 %
STRESS POST ESTIMATED WORKLOAD: 10 METS
STRESS POST EXERCISE DUR MIN: 9 MIN
STRESS POST EXERCISE DUR SEC: 0 SEC
STRESS POST PEAK BP: NORMAL MMHG
STRESS POST PEAK HR: 164 BPM
STRESS TARGET HR: 154 BPM

## 2021-02-24 PROCEDURE — 93016 CV STRESS TEST SUPVJ ONLY: CPT | Performed by: INTERNAL MEDICINE

## 2021-02-24 PROCEDURE — 93320 DOPPLER ECHO COMPLETE: CPT | Performed by: INTERNAL MEDICINE

## 2021-02-24 PROCEDURE — 93350 STRESS TTE ONLY: CPT | Performed by: INTERNAL MEDICINE

## 2021-02-24 PROCEDURE — 93017 CV STRESS TEST TRACING ONLY: CPT

## 2021-02-24 PROCEDURE — 93320 DOPPLER ECHO COMPLETE: CPT

## 2021-02-24 PROCEDURE — 93352 ADMIN ECG CONTRAST AGENT: CPT | Performed by: INTERNAL MEDICINE

## 2021-02-24 PROCEDURE — 93350 STRESS TTE ONLY: CPT

## 2021-02-24 PROCEDURE — 25010000002 PERFLUTREN (DEFINITY) 8.476 MG IN SODIUM CHLORIDE (PF) 0.9 % 10 ML INJECTION: Performed by: INTERNAL MEDICINE

## 2021-02-24 PROCEDURE — 93356 MYOCRD STRAIN IMG SPCKL TRCK: CPT | Performed by: INTERNAL MEDICINE

## 2021-02-24 PROCEDURE — 93356 MYOCRD STRAIN IMG SPCKL TRCK: CPT

## 2021-02-24 PROCEDURE — 93325 DOPPLER ECHO COLOR FLOW MAPG: CPT

## 2021-02-24 PROCEDURE — 93018 CV STRESS TEST I&R ONLY: CPT | Performed by: INTERNAL MEDICINE

## 2021-02-24 PROCEDURE — 93325 DOPPLER ECHO COLOR FLOW MAPG: CPT | Performed by: INTERNAL MEDICINE

## 2021-02-24 RX ADMIN — PERFLUTREN 3 ML: 6.52 INJECTION, SUSPENSION INTRAVENOUS at 09:47

## 2021-02-25 ENCOUNTER — TELEPHONE (OUTPATIENT)
Dept: CARDIOLOGY | Facility: CLINIC | Age: 41
End: 2021-02-25

## 2021-02-25 NOTE — TELEPHONE ENCOUNTER
Results and recommendations reviewed with the patient's wife. She verbalized understanding. Denied further questions at this time.    Edith Martínez RN  Triage [facility]

## 2021-02-25 NOTE — TELEPHONE ENCOUNTER
Dr. Ambrose out of the office this week.  Please let patient know that his stress echo looks good.  Heart is pumping strong and relaxing well.  No significant valvular issues noted.  His EKG stress test and the echo portion of the stress test were both normal, no evidence of tightly blocked arteries in the heart.    Would monitor blood pressure at home to ensure it is well controlled and call if staying greater than 130/80 on average.  Would ease into a light exercise routine and slowly increase as tolerated.  Would have him schedule a 6-month follow-up appointment to be seen in the office.  Notify office sooner if symptoms worsen or fail to improve.  In the meantime would continue to follow with primary care.

## 2021-02-27 PROBLEM — E78.5 DYSLIPIDEMIA: Status: ACTIVE | Noted: 2021-02-27

## 2021-02-27 PROBLEM — I10 ESSENTIAL HYPERTENSION: Status: ACTIVE | Noted: 2021-02-27

## 2021-02-27 PROBLEM — R07.2 PRECORDIAL PAIN: Status: ACTIVE | Noted: 2021-02-27

## 2021-02-27 PROBLEM — R94.31 ABNORMAL EKG: Status: ACTIVE | Noted: 2021-02-27

## 2021-04-16 ENCOUNTER — BULK ORDERING (OUTPATIENT)
Dept: CASE MANAGEMENT | Facility: OTHER | Age: 41
End: 2021-04-16

## 2021-04-16 DIAGNOSIS — Z23 IMMUNIZATION DUE: ICD-10-CM

## 2021-05-10 NOTE — H&P
History and Physical      Patient Name: Juan Mohan   Patient ID: [id number]   Sex: Male   YOB: 1980    Primary Care Provider: Josy OSPINA   Referring Provider: Josy OSPINA    Visit Date: July 1, 2020    Provider: Tony Price MD   Location: [facility]   Location Address: [address] [place], [place] [address]an [facility]  [address] [place] [address]  [id number]   Location Phone: [phone]          Chief Complaint     Chest pain.       History Of Present Illness  Consult requested by: Josy OSPINA   Juan Mohan is a 40 year old /White male with hypertension who was referred because of chest pain. The episode happened at the end of March. It is described as a sharp pain in the left side of the chest, which happened in an episodic fashion throughout the day. He also had a tightness on both sides of the neck. He was evaluated at the [facility] Emergency Room and was discharged home. No major episodes since then. He is fairly active at baseline. Currently has no exertional angina-like symptoms. No dizziness. No shortness of breath. Blood pressure is well controlled per home readings and also during recent office recording. A cardiac evaluation was requested since the chest pain is new and there was radiation to the neck.   PAST MEDICAL HISTORY: 1) Hypertension; 2) Negative for diabetes mellitus or coronary artery disease.   PSYCHOSOCIAL HISTORY: Patient is a current smoker, but smokes less than a pack of cigarettes per day. Reports moderate alcohol consumption. No recreational drug usage. No mood changes or depression.   FAMILY HISTORY: Reviewed. No family history of premature coronary artery disease.   CURRENT MEDICATIONS: Amlodipine 25 mg daily; hydrochlorothiazide 25 mg daily. Dosage and frequency of the medication(s) reviewed with the patient.   ALLERGIES: Codeine; Penicillin; Keflex.       Review of Systems  · Constitutional  o Admits  o :  good general health lately, recent weight changes   o Denies  o : fatigue  · Eyes  o Denies  o : blurred vision  · HENT  o Admits  o : hearing loss or ringing  o Denies  o : chronic sinus problem, swollen glands in neck  · Cardiovascular  o Denies  o : chest pain, palpitations (fast, fluttering, or skipping beats), swelling (feet, ankles, hands), shortness of breath while walking or lying flat  · Respiratory  o Denies  o : chronic or frequent cough, asthma or wheezing, COPD  · Gastrointestinal  o Denies  o : ulcers, nausea or vomiting  · Neurologic  o Denies  o : lightheaded or dizzy, stroke, headaches  · Musculoskeletal  o Admits  o : joint pain, back pain  · Endocrine  o Denies  o : thyroid disease, diabetes, heat or cold intolerance, excessive thirst or urination  · Heme-Lymph  o Denies  o : bleeding or bruising tendency, anemia      Vitals  Date Time BP Position Site L\R Cuff Size HR RR TEMP (F) WT  HT  BMI kg/m2 BSA m2 O2 Sat HC       07/01/2020 01:54 /87 Sitting    65 - R   250lbs 2oz 6'   33.92 2.4           Physical Examination  · Constitutional  o Appearance  o : Awake, alert, in no acute distress.  · Head and Face  o HEENT  o : No pallor, anicteric. Eyes normal. Moist mucous membranes.  · Neck  o Inspection/Palpation  o : Supple. No hepatosplenomegaly.  o Jugular Veins  o : No JVD. No carotid bruits.  · Respiratory  o Auscultation of Lungs  o : Clear to auscultation bilaterally. No crackles or wheezing.  · Cardiovascular  o Heart  o : S1, S2 normally heard. No S3. No murmur, rubs, or gallops.  · Gastrointestinal  o Abdominal Examination  o : Soft, non-distended. No palpable hepatosplenomegaly. Bowel sounds heard in all four quadrants.  · Musculoskeletal  o General  o : Normal muscle tone and strength.  · Skin and Subcutaneous Tissue  o General Inspection  o : No skin rashes.  · Extremities  o Extremities  o : Warm and well perfused. Distal pulses present. No pitting pedal edema.  · EKG  o EKG  o :  EKG done at the [facility] Emergency Room showed sinus rhythm with sinus arrhythmia, no ST-T changes; otherwise normal EKG.   · Labs  o Labs  o : On 03/28/2020, hemoglobin 16.2, hematocrit 46.1, platelet count 280,000, BUN 9, creatinine 0.97, sodium 137, potassium 3.6, chloride 100, calcium 10.0, albumin 4.7, AST 28, ALT 54, magnesium 2.2.          Assessment     ASSESSMENT & PLAN:    1.  Chest pain, single episode, has both typical and atypical features for angina.  Risk factors for coronary        artery disease include hypertension.  EKG is unremarkable.  Will proceed with an echocardiogram to assess        the LV systolic and diastolic function to rule out any significant valvular abnormalities.  Patient will also need        an exercise treadmill stress test to rule out reversible ischemia.  2.  Hypertension, well controlled.  Continue current regimen.  3.  Follow up with echo and stress test reports.      MD KARL Ye:vm             Electronically Signed by: Gaby Martin-, Other -Author on July 6, 2020 09:26:42 AM  Electronically Co-signed by: Tony Price MD -Reviewer on July 7, 2020 02:43:18 PM

## 2021-05-15 VITALS
SYSTOLIC BLOOD PRESSURE: 135 MMHG | DIASTOLIC BLOOD PRESSURE: 87 MMHG | HEIGHT: 72 IN | WEIGHT: 250.12 LBS | BODY MASS INDEX: 33.88 KG/M2 | HEART RATE: 65 BPM

## 2021-05-18 NOTE — PROGRESS NOTES
Juan Mohan  1980     Office/Outpatient Visit    Visit Date: Tue, May 12, 2020 11:35 am    Provider: Josy Vinson N.P. (Assistant: Spurling, Sarah C, MA)    Location: [facility]        Electronically signed by Josy Vinson N.P. on  05/12/2020 09:47:22 PM                             Subjective:        CC: Mr. Mohan is a 40 year old White male.  This is a follow-up visit.  [employer] phone [employer] [phone]         HPI:           Patient presents with essential (primary) hypertension.  This was first diagnosed several years ago.  His current cardiac medication regimen includes a calcium channel blocker.  Review of his blood pressure log reveals systolics in the 120-140 range, diastolics in the 80-90 range, and mostly in the 120s.            Additionally, he presents with history of dysthymic disorder.  his anxiety disorder was originally diagnosed many years ago.  His symptom complex includes chest pain, a choking or smothering sensation, insomnia, light-headedness, palpitations, paresthesias, increased perspiration, shortness of breath, and tachycardia.  True panic attacks occur in addition to generalized anxiety.  The frequency symptoms is several times per week.  Apparent triggers include now feels like Covid19 has been increasing his intensity.  He is not currently being treated for anxiety.  Previous attempts at treatment have included a benzodiazepine (xanax in the past) and antidepressants a tricyclic antidepressant and an SSRI antidepressant.            Mr. Mohan presents in follow up from ER. He was seen in the ER on 3/28/2020 - [facility].  He was diagnosed with chest pain, anxiety, Hypertension.  The following lab tests were done: CK-MB ( 92(elevated), all others WNL ), CBC ( basically normal ), hepatic function panel ( bilirubin slightly elevated ), d-dimer - WNL.  The following radiology tests were done: chest x-ray ( no acute distress ).  The following  procedures were done: EKG ( arrhythmia ) reports that he feels like this was his anxiety, and was not told what he needed to do for follow up     ROS:     CONSTITUTIONAL:  Negative for chills, fatigue and fever.      CARDIOVASCULAR:  Positive for chest pain.      RESPIRATORY:  Positive for dyspnea ( thinks may be anxiety ).   Negative for cough.      PSYCHIATRIC:  Positive for anxiety, feelings of stress and insomnia.   Negative for depression or suicidal thoughts.          Past Medical History / Family History / Social History:         Last Reviewed on 2019 01:56 PM by Josy Vinson    Past Medical History:         Fracture(s): L RAD/ULNA, R CLAVICLE;         PAST MEDICAL HISTORY             CURRENT MEDICAL PROVIDERS:    [facility]         Surgical History:         Positive for    Appendectomy and    Fracture(s):    fracture of radius/ulna: dx'd at age 17; left; ;     Positive for    Colonoscopy ( --NEG;; );     Procedures:    EGD ( 3- )         Family History:         Positive for Coronary Artery Disease and Hypertension.      Positive for Type 2 Diabetes.  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          Alcohol: Frequency:    OCCASIONAL;      Caffeine:  He admits to consuming caffeine via -LITTLE.          Substance Abuse History:     Last Reviewed on 4/13/2017 02:25 PM by Cherelle Russ    NEGATIVE         Mental Health History:     Last Reviewed on 4/13/2017 02:25 PM by Cherelle Russ        Communicable Diseases (eg STDs):     Last Reviewed on 4/13/2017 02:25 PM by Cherelle Russ        Current Problems:     Last Reviewed on 5/12/2020 11:35 AM by Spurling, Sarah C    Nicotine dependence, unspecified, uncomplicated    Dysthymic disorder    Vitamin D deficiency, unspecified    Pure hyperglyceridemia    Essential (primary) hypertension    Acute serous otitis media, left ear    Pain in left shoulder    Acute upper respiratory infection, unspecified    Acute stress reaction        Immunizations:     influenza, injectable, quadrivalent, preservative free 10/23/2019    zzFluzone pf-quadrivalent 3 and up 9/8/2017    Fluzone Quadrivalent (3+ years) 9/8/2016    Influenza Virus Vaccine pf (adult) 9/1/2018        Allergies:     Last Reviewed on 5/12/2020 11:35 AM by Spurling, Sarah C    Levaquin: Rash     Wellbutrin XL: hives     Lisinopril:   (Adverse Reaction)    Penicillins:      Keflex:      Codeine Sulfate:          Current Medications:     Last Reviewed on 5/12/2020 11:36 AM by Spurling, Sarah C    Amlodipine  5 mg oral tablet [1 tab daily]    hydroCHLOROthiazide 25 mg oral tablet [take 1 tablet (25 mg) by oral route once per day]        Objective:        Vitals: 140/96 today (home reading)        Exams:     PHYSICAL EXAM:     GENERAL: well developed, well nourished;  well groomed;  anxious;     RESPIRATORY: normal respiratory rate and pattern with no distress; no acute distress;     NEUROLOGICAL:  cranial nerves, motor and sensory function, reflexes, gait and coordination are all intact;     PSYCHIATRIC: affect/demeanor: anxious;  psychomotor: avoids eye contact;  normal speech pattern; normal thought and perception;         Assessment:          I10   Essential (primary) hypertension       F17.200   Nicotine dependence, unspecified, uncomplicated       F34.1   Dysthymic disorder       R07.9   Chest pain, unspecified       G47.00   Insomnia, unspecified       I49.9   Cardiac arrhythmia, unspecified           ORDERS:         Meds Prescribed:       [Refilled] amLODIPine 5 mg oral tablet [1 tab daily], #90 (ninety) tablets, Refills: 0 (zero)       [Refilled] hydroCHLOROthiazide 25 mg oral tablet [take 1 tablet (25 mg) by oral route once per day], #90 (ninety) tablets, Refills: 0 (zero)       [New Rx] venlafaxine 37.5 mg oral Capsule, Extended Release 24 hr [take 1 capsule (37.5 mg) by oral route once daily with food], #30 (thirty) capsules, Refills: 2 (two)       [New Rx] hydrOXYzine HCL 25 mg oral tablet [take 1-2  tablet  by oral route HS PRN insomnia], #20 (twenty) tablets, Refills: 0 (zero)         Lab Orders:       APPTO  Appointment need  (In-House)              Procedures Ordered:       REFER  Referral to Specialist or Other Facility  (Send-Out)            REFER  Referral to Specialist or Other Facility  (Send-Out)              Other Orders:         Smoking and Tobacco Cessation 3 to 10 minutes  (In-House)                      Plan:         Essential (primary) hypertension    Telehealth: Verbal consent obtained for visit to occur via televideo conferencing; Staff, other than provider, present during telephone visit include patient, wife and provider     FOLLOW-UP: Schedule a follow-up visit in 3 months.:.            Prescriptions:       [Refilled] amLODIPine 5 mg oral tablet [1 tab daily], #90 (ninety) tablets, Refills: 0 (zero)       [Refilled] hydroCHLOROthiazide 25 mg oral tablet [take 1 tablet (25 mg) by oral route once per day], #90 (ninety) tablets, Refills: 0 (zero)           Orders:       APPTO  Appointment need  (In-House)              Nicotine dependence, unspecified, uncomplicatedDoes not wish to stop smoking at this time         RECOMMENDATIONS given include: Counseled on smoking cessation and advised of the benefits to patient's health if he were to stop smoking. Counseling for 3 to 10 minutes.            Orders:         Smoking and Tobacco Cessation 3 to 10 minutes  (In-House)              Dysthymic disorder        REFERRALS:  Referral initiated to a psychologist ( [facility] ).            Prescriptions:       [New Rx] venlafaxine 37.5 mg oral Capsule, Extended Release 24 hr [take 1 capsule (37.5 mg) by oral route once daily with food], #30 (thirty) capsules, Refills: 2 (two)           Orders:       REFER  Referral to Specialist or Other Facility  (Send-Out)              Chest pain, unspecifiedI am going to refer to cardiology for his abnormal EKG from [facility].  I do feel that much of this is related to his anxiety, however, He does have HTN that is often uncontrolled and should have a work up for his chest pain.  We are currently trying to get his anxiety under control as well.         REFERRALS:  Referral initiated to a cardiologist ( Dr. Tony Price, [facility] ).            Orders:       REFER  Referral to Specialist or Other Facility  (Send-Out)              Insomnia, unspecified          Prescriptions:       [New Rx] hydrOXYzine HCL 25 mg oral tablet [take 1-2  tablet  by oral route HS PRN insomnia], #20 (twenty) tablets, Refills: 0 (zero)         Cardiac arrhythmia, unspecifiedas above will refer            Patient Recommendations:        For  Essential (primary) hypertension:    Schedule a follow-up visit in 3 months.                APPOINTMENT INFORMATION:        Monday Tuesday Wednesday Thursday Friday Saturday Sunday            Time:___________________AM  PM   Date:_____________________         For  Nicotine dependence, unspecified, uncomplicated:        Stop smoking.              Charge Capture:         Primary Diagnosis:     I10  Essential (primary) hypertension            Orders:      26191  Office/outpatient visit; established patient, level 4  (In-House)            APPTO  Appointment need  (In-House)              F17.200  Nicotine dependence, unspecified, uncomplicated           Orders:        Smoking and Tobacco Cessation 3 to 10 minutes  (In-House)              F34.1  Dysthymic disorder     R07.9  Chest pain, unspecified     G47.00  Insomnia, unspecified     I49.9  Cardiac arrhythmia, unspecified

## 2021-05-18 NOTE — PROGRESS NOTES
Juan Mohan 1980     Office/Outpatient Visit    Visit Date: Wed, Jul 3, 2019 01:21 pm    Provider: Josy Vinson N.P. (Assistant: Whit Stahl)    Location: [facility]        Electronically signed by Josy Vinson N.P. on  07/03/2019 02:00:19 PM                             SUBJECTIVE:        CC:     Mr. Mohan is a 39 year old White male.  presents today due to Left sided temporal HA X 1.5 day, mostly resolved. Left arm pain.          HPI:         Headache noted.  The location is primarily left temporal.  The pain radiates to the left jaw and around entire ear.  Mr. Mohan denies having significant prior headaches.  Associated symptoms include left ear pain / pressure.  He denies allergy symptoms or fever.          In regard to the essential hypertension, he did not bring his blood pressure diary, but says that pressures have been okay.  He is tolerating the medication well without side effects.  Compliance with treatment has been good.          Regarding arm pain - Juan reports that his left arm has been going numb and tingling - it is even going down into his fingers.  he was a previous patient of pain managment for chronic back pain.  He does not remember if he ever saw a neurosurgeon.  His last MRI of his neck was in 2017 with bulging disc.  He reports today that his left arm is almost constantly going numb.  he does notice that when he turns or tilts his head to the right, it worsens and is relieved somewhat when he goes back to center.  He does take ibuprofen when the arm gets worse, but does not take routinely     ROS:     CONSTITUTIONAL:  Negative for chills, fatigue and fever.      EYES:  Negative for blurred vision.      E/N/T:  Positive for ear pain ( left ).   Negative for sore throat.      CARDIOVASCULAR:  Negative for chest pain and pedal edema.      RESPIRATORY:  Negative for recent cough and dyspnea.      MUSCULOSKELETAL:  Positive for limb pain ( left  arm / shoulder tingling/ fingers ).   Negative for arthralgias or myalgias.      INTEGUMENTARY:  Negative for pruritis and rash.      NEUROLOGICAL:  Positive for headaches ( left temple ).   Negative for dizziness or paresthesias.      ALLERGIC/IMMUNOLOGIC:  Positive for seasonal allergies.          PMH/FMH/SH:     Last Reviewed on 2019 01:56 PM by Josy Vinson    Past Medical History:         Fracture(s): L RAD/ULNA, R CLAVICLE;         PAST MEDICAL HISTORY             CURRENT MEDICAL PROVIDERS:    [facility]         Surgical History:         Positive for    Appendectomy and    Fracture(s):    fracture of radius/ulna: dx'd at age 17; left; ;     Positive for    Colonoscopy ( --NEG;; );     Procedures:    EGD ( 3- )         Family History:         Positive for Coronary Artery Disease and Hypertension.      Positive for Type 2 Diabetes.  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          Alcohol: Frequency:    OCCASIONAL;     Caffeine:  He admits to consuming caffeine via -LITTLE.          Substance Abuse History:     Last Reviewed on 2017 02:25 PM by Cherelle Russ         Mental Health History:     Last Reviewed on 2017  02:25 PM by Cherelle Russ        Communicable Diseases (eg STDs):     Last Reviewed on 4/13/2017 02:25 PM by Cherelle Russ            Current Problems:     Last Reviewed on 7/03/2019 01:56 PM by Josy Vinson    Serous otitis media     Essential hypertension     Hyperlipidemia     Degenerative disc disease - C Spine     Vitamin D deficiency, unspecified     Anxiety with depression     Allergies     Chronic low back pain     History of GERD     Cigarette smoking     Arm pain         Immunizations:     zzFluzone pf-quadrivalent 3 and up 9/8/2017     Fluzone Quadrivalent (3+ years) 9/8/2016     Influenza Virus Vaccine pf (adult) 9/1/2018         Allergies:     Last Reviewed on 7/03/2019 01:21 PM by Whit Stahl    Levaquin: rash    Wellbutrin XL: hives    Lisinopril: (Adverse Reaction)    Penicillins:    Keflex:    Codeine Sulfate:        Current Medications:     Last Reviewed on 7/03/2019 01:26 PM by Whit Stahl    Amlodipine  5mg Tablet 1 tab daily     Albuterol 90mcg/1actuation Oral Inhaler 2 puff  QID  PRN         OBJECTIVE:        Vitals:         Current: 7/3/2019 1:26:00 PM    Ht:  6 ft, 0 in;  Wt: 248.8 lbs;  BMI: 33.7    T: 98.6 F (oral);  BP: 128/88 mm Hg (right arm, sitting);  P: 60 bpm (right arm (BP Cuff), sitting);  sCr: 1.05 mg/dL;  GFR: 108.61    O2 Sat: 97 % (room air)        Exams:     PHYSICAL EXAM:     GENERAL: Vitals recorded well developed, well nourished;  no apparent distress;     E/N/T: EARS: external auditory canal normal bilaterally;  the left TM is dull and yellow;  NOSE:  normal nasal mucosa, septum, turbinates, and sinuses; OROPHARYNX:  normal mucosa, dentition, gingiva, and posterior pharynx;     NECK: range of motion is normal;     RESPIRATORY: normal appearance and symmetric expansion of chest wall; normal respiratory rate and pattern with no distress; normal breath sounds with no rales, rhonchi, wheezes or rubs;     CARDIOVASCULAR: normal rate; rhythm is  regular;  no edema;     LYMPHATIC: left preauricular node ( tender );  no supraclavicular nodes;     MUSCULOSKELETAL: normal gait; normal range of motion of all major muscle groups; pain with range of motion in: pain / tingling elicited in the left arm when rotating / tilting head to the right - relieved when moved back to resting state;     NEUROLOGIC: mental status: alert and oriented x 3;     PSYCHIATRIC: appropriate affect and demeanor; normal speech pattern; normal thought and perception;         ASSESSMENT           381.4   H65.02  Serous otitis media              DDx:     729.5   M54.12  Arm pain              DDx:     401.1   I10  Essential hypertension              DDx:         ORDERS:         Meds Prescribed:       Doxycycline Monohydrate 100mg Capsules Take 1 capsule(s) by mouth bid x10 days  #20 (Twenty) capsule(s) Refills: 0       Diclofenac Sodium 75mg Tablets, Enteric Coated 1 tab bid with food  #60 (Sixty) tablet(s) Refills: 2       Refill of: Amlodipine  5mg Tablet 1 tab daily  #30 (Thirty) tablet(s) Refills: 5                 PLAN:          Serous otitis media           Prescriptions:       Doxycycline Monohydrate 100mg Capsules Take 1 capsule(s) by mouth bid x10 days  #20 (Twenty) capsule(s) Refills: 0          Arm pain           Prescriptions:       Diclofenac Sodium 75mg Tablets, Enteric Coated 1 tab bid with food  #60 (Sixty) tablet(s) Refills: 2          Essential hypertension           Prescriptions:       Refill of: Amlodipine  5mg Tablet 1 tab daily  #30 (Thirty) tablet(s) Refills: 5             CHARGE CAPTURE           **Please note: ICD descriptions below are intended for billing purposes only and may not represent clinical diagnoses**        Primary Diagnosis:         381.4 Serous otitis media            H65.02    Acute serous otitis media, left ear              Orders:          88915   Office/outpatient visit; established patient, level 3  (In-House)           729.5 Arm pain             M54.12    Radiculopathy, cervical region    401.1 Essential hypertension            I10    Essential (primary) hypertension

## 2021-05-18 NOTE — PROGRESS NOTES
Juan Mohan  1980     Office/Outpatient Visit    Visit Date: Sat, Mar 28, 2020 10:01 am    Provider: Abundio Markham MD (Assistant: Rika Nunez MA)    Location: [facility]        Electronically signed by Abundio Markham MD on  03/28/2020 11:18:22 AM                             Subjective:        CC: Mr. Mohan is a 40 year old White male.  presents today due to complaints of cough, sore throat X 2 days         HPI:           Patient complains of acute upper respiratory infection, unspecified.  These have been present for the past one to two days.  The symptoms include cough and sore throat.  He denies exposure to ill contacts.  About two days ago was at friends house and felt crappy every since then.  He has noted BP running high since then.  Also noted loss of appetite. Friend is not sick. Wife is well.  duty without exposure that he knows of.  Yesterday was sweating bullets.  Did not check temp.       He has hx of HTN.  Has been on amlopine, but not taken it yet this AM. Doesn't check BP often, but since leaving that party notices dizziness / or orverwhelming anxiety when going from sitting to standing.      Brings up a since of anxiety.  Evidently there was someone at his friend's house that doesn't get along with him and he has some concerns about the person.  Seems to involve another family member too.  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     ROS:     CONSTITUTIONAL:  Negative for chills, fatigue and fever.      CARDIOVASCULAR:  Negative for chest pain, orthopnea, paroxysmal nocturnal dyspnea and pedal edema.      RESPIRATORY:  Negative for dyspnea and cough.      GASTROINTESTINAL:  Negative for abdominal pain, heartburn, constipation, diarrhea, and stool changes.      GENITOURINARY:  Negative for dysuria and polyuria.      PSYCHIATRIC:  Negative for anxiety and depression.          Past  Medical History / Family History / Social History:         Last Reviewed on 2019 01:56 PM by Josy Vinson    Past Medical History:         Fracture(s): L RAD/ULNA, R CLAVICLE;         PAST MEDICAL HISTORY             CURRENT MEDICAL PROVIDERS:    [facility]         Surgical History:         Positive for    Appendectomy and    Fracture(s):    fracture of radius/ulna: dx'd at age 17; left; ;     Positive for    Colonoscopy ( --NEG;; );     Procedures:    EGD ( 3- )         Family History:         Positive for Coronary Artery Disease and Hypertension.      Positive for Type 2 Diabetes.  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          Alcohol: Frequency:    OCCASIONAL;     Caffeine:  He admits to consuming caffeine via -LITTLE.          Substance Abuse History:     Last Reviewed on 2017 02:25 PM by Cherelle Russ    NEGATIVE         Mental Health History:     Last Reviewed on 2017 02:25 PM by Cherelle Russ        Communicable Diseases (eg STDs):     Last Reviewed on 2017 02:25 PM by Cherelle Russ        Allergies:     Last Reviewed on 2020 05:35 PM by Windy Bautista    Levhome: Rash     Wellbutrin XL: hives      Lisinopril:   (Adverse Reaction)    Penicillins:      Keflex:      Codeine Sulfate:          Current Medications:     Last Reviewed on 1/07/2020 05:36 PM by Windy Bautista    Albuterol 90mcg/1actuation Oral Inhaler [2 puff  QID  PRN]    Amlodipine  5 mg oral tablet [1 tab daily]    diclofenac sodium 75 mg oral tablet, delayed release (enteric coated) [take 1 tablet (75 mg) by oral route 2 times per day]        Objective:        Vitals:         Current: 3/28/2020 10:04:33 AM    Ht:  6 ft, 0 in;  Wt: 241.8 lbs;  BMI: 32.8T: 98.3 F (oral);  BP: 150/108 mm Hg (left arm, sitting);  P: 98 bpm (left arm (BP Cuff), sitting);  sCr: 1.05 mg/dL;  GFR: 106.28O2 Sat: 97 % (room air)        Repeat:     10:6:52 AM  BP:   156/118mm Hg (left arm, sitting, HR: 99) 10:58:8 AM  BP:   150/108mm Hg (left arm, sitting, by stiles)     Exams:     PHYSICAL EXAM:     GENERAL:  well developed and nourished; appropriately groomed; in no apparent distress;     EYES: Nonicteric and with unremarkable lids, iris and pupils;     E/N/T:  normal EACs, TMs, nasal/oral mucosa, teeth, gingiva, and oropharynx;     NECK: carotid exam reveals no bruits;     RESPIRATORY: normal respiratory rate and pattern with no distress; normal breath sounds with no rales, rhonchi, wheezes or rubs;     CARDIOVASCULAR: normal rate; rhythm is regular;  no systolic murmur;     LYMPHATIC: no enlargement of cervical or facial nodes;     MUSCULOSKELETAL: normal overall tone No pedal edema.;     NEUROLOGIC: No lateralizing deficit.;     PSYCHIATRIC:  appropriate affect and demeanor; normal speech pattern; grossly normal memory;         Lab/Test Results:         Performed by:: AS (03/28/2020),     Rapid Strep Screen: Negative (03/28/2020),     Influenza A and B: Negative (03/28/2020),             Assessment:         J06.9   Acute upper respiratory infection, unspecified       I10   Essential (primary) hypertension       F43.0   Acute stress reaction           ORDERS:          Meds Prescribed:       [New Rx] hydroCHLOROthiazide 25 mg oral tablet [take 1 tablet (25 mg) by oral route once per day], #90 (ninety) tablets, Refills: 0 (zero)         Lab Orders:       [id number]  Infectious agent antigen detection by immunoassay; Influenza  (In-House)            10790  Infectious agent antigen detection by immunoassay; Influenza  (In-House)            03873  Group A Streptococcus detection by immunoassay with direct optical observation  (In-House)            66334  [facility] Throat culture, strep  (Send-Out)                      Plan:         Acute upper respiratory infection, unspecifiedsymptomatic treatment. reassured him that doesn't seem that he has Covid type symptoms at this time, but also couldn't rule it out. Likely viral illness of some type.  Practice good hygiene and social distancing even at home.  Call back if symptoms worsen.  Informed of possibility for telehealth visit.          Orders:       [id number]  Infectious agent antigen detection by immunoassay; Influenza  (In-House)            [id number]  Infectious agent antigen detection by immunoassay; Influenza  (In-House)            [id number]  Group A Streptococcus detection by immunoassay with direct optical observation  (In-House)            33857  [facility] Throat culture, strep  (Send-Out)              Essential (primary) hypertensionI am going to add HCTZ and have him monitor BP at home for two wks and then get us those results.           Prescriptions:       [New Rx] hydroCHLOROthiazide 25 mg oral tablet [take 1 tablet (25 mg) by oral route once per day], #90 (ninety) tablets, Refills: 0 (zero)         Acute stress reactionSince the anxiety seems to be related to the recent event, I am going to hold off on medication and see if time will help alleviate symptoms.  If persists told him not to ignore, but discuss with PCP and might consider adding medication- probably not a benzo- but maybe BuSpar or SSRI.   Can do that eval via  telehealth if needed.             Charge Capture:         Primary Diagnosis:     J06.9  Acute upper respiratory infection, unspecified           Orders:      [id number]  Office/outpatient visit; established patient, level 4  (In-House)            [id number]  Infectious agent antigen detection by immunoassay; Influenza  (In-House)            82131  Infectious agent antigen detection by immunoassay; Influenza  (In-House)            67755  Group A Streptococcus detection by immunoassay with direct optical observation  (In-House)              I10  Essential (primary) hypertension     F43.0  Acute stress reaction

## 2021-05-18 NOTE — PROGRESS NOTES
"Juan Mohan  1980     Office/Outpatient Visit    Visit Date: Tue, Oct 6, 2020 03:38 pm    Provider: Josy Vinson N.P. (Assistant: Marya Flores MA)    Location: [facility]        Electronically signed by Josy Vinson N.P. on  10/11/2020 08:58:48 PM                             Subjective:        CC: Mr. Mohan is a 40 year old White male.  Cervical pain.          HPI:           Complaint of cervicalgia..  Juan is here today for follow up on long standing history of neck pain.  He reports today that he is currently in the [employer] and has had restrictions on his services for \"years\"  relating to his neck pain.  He has a restriction stating that he is unable to wear a certain type of gear that is often required.  In the more recent term, he has been assigned to duty for the riots in [place] and the captain reported that if he was unable to get the restrictions lifted, he would be let go.  He does have a form on his phone that tells what his duties would be, however, it states gear, equipment that is unfamiliar to this provider.  He was told this could be released from his PCP He does have continued off and on neck pain.  He did have an MRI in the past that shows some bulging disc, and chronic changes.  He states that his neck is no better , no worse    ROS:     CONSTITUTIONAL:  Negative for chills, fatigue and fever.      CARDIOVASCULAR:  Negative for chest pain and pedal edema.      RESPIRATORY:  Negative for recent cough and dyspnea.      MUSCULOSKELETAL:  Positive for back pain ( chronic; neck ).   Negative for arthralgias or myalgias.      NEUROLOGICAL:  Negative for paresthesias.          Past Medical History / Family History / Social History:         Last Reviewed on 9/21/2020 09:07 AM by Josy Vinson    Past Medical History:         Fracture(s): L RAD/ULNA, R CLAVICLE;         PAST MEDICAL HISTORY             PREVENTIVE HEALTH MAINTENANCE             " DENTAL CLEANING: was last done      EYE EXAM: was last done 10/2019         Surgical History:         Positive for    Appendectomy and    Fracture(s):    fracture of radius/ulna: dx'd at age 17; left; ;     Positive for    Colonoscopy ( --NEG;; );     Procedures:    EGD ( 3- )         Family History:         Positive for Coronary Artery Disease and Hypertension.      Positive for Type 2 Diabetes.  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          Alcohol: Frequency:    OCCASIONAL;     Caffeine:  He admits to consuming caffeine via -LITTLE.          Substance Abuse History:     Last Reviewed on 2020 09:07 AM by Josy Vinson    NEGATIVE         Mental Health History:     Last Reviewed on 2020 09:07 AM by Josy Vinson        Generalized Anxiety Disorder         Communicable Diseases (eg STDs):     Last Reviewed on 2020 09:07 AM by Josy Vinson    Reportable health conditions; NEGATIVE         Current Problems:     Last Reviewed on 2020 09:07 AM by Josy Vinson    Nicotine dependence, unspecified, in remission    Dysthymic disorder    Vitamin D deficiency, unspecified    Pure hyperglyceridemia    Essential (primary) hypertension     Cardiac arrhythmia, unspecified    Chest pain, unspecified    Insomnia, unspecified    Encounter for general adult medical examination without abnormal findings    Encounter for immunization    Allergic rhinitis, unspecified        Immunizations:     influenza, injectable, quadrivalent, preservative free 10/23/2019    influenza, injectable, quadrivalent, preservative free (FLUZONE QUAD 8822-4047) 9/21/2020    zzFluzone pf-quadrivalent 3 and up 9/8/2017    Fluzone Quadrivalent (3+ years) 9/8/2016    Influenza Virus Vaccine pf (adult) 9/1/2018        Allergies:     Last Reviewed on 9/21/2020 09:07 AM by Josy Vinson    Levaquin: Rash     Wellbutrin XL: hives     Lisinopril:   (Adverse Reaction)    Penicillins:      Codeine Phosphate:      Keflex:      Codeine Sulfate:          Current Medications:     Last Reviewed on 9/21/2020 09:07 AM by Josy Vinson    amLODIPine 5 mg oral tablet [1 tab daily]    hydroCHLOROthiazide 25 mg oral tablet [take 1 tablet (25 mg) by oral route once per day]    fluticasone propionate 50 mcg/actuation Intranasal Spray, Suspension [inhale 1 spray (50 mcg) in each nostril by intranasal route 1 times per day]        Objective:        Vitals:         Historical:     9/21/2020  Wt:   246lbs    Current: 10/6/2020 3:42:55 PM    Ht:  6 ft, 0 in;  Wt: 241.4 lbs;  BMI: 32.7T: 96.6 F (temporal);  BP: 155/109 mm Hg (right arm, sitting);  P: 71 bpm (right arm (BP Cuff), sitting);  sCr: 1 mg/dL;  GFR: 111.52        Repeat:     3:44:57 PM  BP:   146/103mm Hg3:45:27 PM  BP:   146/103mm Hg (left arm, sitting, Pulse 70) 4:40:0 PM  BP:   135/88mm Hg (right arm, sitting)     Exams:     PHYSICAL EXAM:     GENERAL: Vitals recorded well developed, well nourished;  no apparent distress;     NECK:     RESPIRATORY: normal appearance and symmetric expansion of chest wall; normal respiratory rate and pattern with no distress; normal breath sounds with no rales, rhonchi, wheezes or rubs;     CARDIOVASCULAR:  normal rate; rhythm is regular;  no edema;     MUSCULOSKELETAL: normal gait; normal range of motion of all major muscle groups; pain with range of motion in: neck extension;     NEUROLOGIC: mental status: alert and oriented x 3;     PSYCHIATRIC: appropriate affect and demeanor; normal speech pattern; normal thought and perception;         Assessment:         M54.2   Cervicalgia           Plan:         Cervicalgiawill drop by the form for us to complete I have informed Juan that it is difficult for me to determine if he is able to perform the required duties, since I am unfamiliar with the gear that is named in the form.  I would recommend that he reach out to the  and see if they have a place that he can be evaluated.  He was given 90 days to have this completed.  I did not put him on the restrictions so I am not sure as to the progression of his condition. He may need to go to PT for clearance             Charge Capture:         Primary Diagnosis:     M54.2  Cervicalgia           Orders:      [id number]  Office/outpatient visit; established patient, level 3  (In-House)

## 2021-05-18 NOTE — PROGRESS NOTES
Juan Mohan. 1980     Office/Outpatient Visit    Visit Date: Tue, Aug 7, 2018 06:08 pm    Provider: Josy Vinson N.P. (Assistant: Sarah Spurling, MA)    Location: [facility]        Electronically signed by Josy Vinson N.P. on  08/08/2018 10:17:27 PM                             SUBJECTIVE:        CC:     Mr. Mohan is a 38 year old White male.  Right side abdominal spasms, experiencing some sharp pains and going to his back, started almost two weeks ago.          HPI:         Patient to be evaluated for abdominal pain, unspecified.  This is located primarily in the right upper quadrant.  There is some radiation to the back.  It began 2 weeks ago.  The onset of pain occurred with no apparent trigger.  He characterizes it as cramping.  He estimates that the frequency of pain is waxes and wanes.  The typical duration is quite variable.  Associated symptoms include constipation.  He denies change in bowel habits, diarrhea, fever, nausea or vomiting.  Habits include 'weekend warrior'drinker.  does not worsen when drinking alcohol     ROS:     CONSTITUTIONAL:  Negative for chills, fatigue, fever and weight change.      CARDIOVASCULAR:  Negative for chest pain, orthopnea, paroxysmal nocturnal dyspnea and pedal edema.      RESPIRATORY:  Negative for dyspnea and cough.      GASTROINTESTINAL:  Positive for abdominal pain ( RUQ ) and constipation.   Negative for heartburn, nausea or vomiting.      MUSCULOSKELETAL:  Positive for back pain ( right mid flank ).          PMH/FMH/SH:     Last Reviewed on 1/05/2018 01:20 PM by Josy Vinson    Past Medical History:         Fracture(s): L RAD/ULNA, R CLAVICLE;         PAST MEDICAL HISTORY             CURRENT MEDICAL PROVIDERS:    [facility]         Surgical History:         Positive for    Appendectomy and    Fracture(s):    fracture of radius/ulna: dx'd at age 17; left; ;     Positive for    Colonoscopy ( 2007--NEG;; );      Procedures:    EGD ( 3-2013 )         Family History:         Positive for Coronary Artery Disease and Hypertension.      Positive for Type 2 Diabetes.  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          Alcohol: Frequency:    OCCASIONAL;     Caffeine:  He admits to consuming caffeine via -LITTLE.          Substance Abuse History:     Last Reviewed on 2017 02:25 PM by Cherelle Russ    NEGATIVE         Mental Health History:     Last Reviewed on 2017 02:25 PM by Cherelle Russ        Communicable Diseases (eg STDs):     Last Reviewed on 2017 02:25 PM by Cherelle Russ            Current Problems:     Last Reviewed on 2018 01:19 PM by Josy Vinson    Essential hypertension     Hyperlipidemia     Degenerative disc disease - C Spine     Vitamin D deficiency, unspecified     Anxiety with depression     Allergies     Chronic low back pain     History of GERD     Cigarette smoking         Immunizations:     zzFluzone pf-quadrivalent 3 and up 2017     Fluzone Quadrivalent (3+ years) 2016         Allergies:     Last Reviewed on 2018 01:19 PM by Josy Vinson    Levaquin: rash    Wellbutrin XL: hives    Lisinopril: (Adverse  Reaction)    Penicillins:    Keflex:    Codeine Sulfate:        Current Medications:     Last Reviewed on 1/05/2018 01:19 PM by Josy Vinson    Amlodipine  5mg Tablet 1 tab daily     Albuterol 90mcg/1actuation Oral Inhaler 2 puff  QID  PRN         OBJECTIVE:        Vitals:         Current: 8/7/2018 6:13:19 PM    Ht:  6 ft, 0 in;  Wt: 240.6 lbs;  BMI: 32.6    T: 97.8 F (oral);  BP: 140/103 mm Hg (right arm, sitting);  P: 67 bpm (left arm (BP Cuff), sitting);  sCr: 0.97 mg/dL;  GFR: 117.00        Exams:     PHYSICAL EXAM:     GENERAL: Vitals recorded well developed, well nourished;  well groomed;  no apparent distress;     NECK:  supple, full ROM; no thyromegaly; no carotid bruits;     RESPIRATORY: normal respiratory rate and pattern with no distress; normal breath sounds with no rales, rhonchi, wheezes or rubs;     CARDIOVASCULAR: normal rate; rhythm is regular;  normal S1; normal S2; no systolic murmur; no cyanosis; no edema;     GASTROINTESTINAL: nontender, nondistended; no hepatosplenomegaly or masses; no bruits; mild epigastric and RUQ tenderness;  normal bowel sounds;     MUSCULOSKELETAL:  Normal range of motion, strength and tone;     NEUROLOGICAL:  cranial nerves, motor and sensory function, reflexes, gait and coordination are all intact;     PSYCHIATRIC:  appropriate affect and demeanor; normal speech pattern; grossly normal memory;         ASSESSMENT:           789.00   R10.84  Abdominal pain, unspecified              DDx:         ORDERS:         Lab Orders:       FUTURE  Future order to be done at patients convenience  (Send-Out)         06532  AMYS - [facility] Amylase, Serum  (Send-Out)         49334  BDCB2 - [facility] CBC w/o diff  (Send-Out)         04976  COMP - [facility]. Metabolic Panel  (Send-Out)         62533  HPUBT - [facility] H.pylori Breath test  (Send-Out)         66622  LIP - [facility] Lipase, Serum  (Send-Out)                   PLAN:          Abdominal pain, unspecified will check labs Consider US of RUQ pending  labs         FOLLOW-UP TESTING #1: FOLLOW-UP LABORATORY:  Labs to be scheduled in the future include amylase, CBC without diff, CMP, H.pylori urea breath test ([facility]), and Lipase.            Orders:       FUTURE  Future order to be done at patients convenience  (Send-Out)         08153  AMYS - [facility] Amylase, Serum  (Send-Out)         20148  BDCB2 - [facility] CBC w/o diff  (Send-Out)         43190  COMP - [facility] Comp. Metabolic Panel  (Send-Out)         30183  HPUBT - [facility] H.pylori Breath test  (Send-Out)         18624  LIP - [facility] Lipase, Serum  (Send-Out)               Patient Recommendations:        For  Abdominal pain, unspecified:             The following laboratory testing has been ordered: amylase metabolic panel, comprehensive             CHARGE CAPTURE:           Primary Diagnosis:     789.00 Abdominal pain, unspecified            R10.84    Generalized abdominal pain              Orders:          23216   Office/outpatient visit; established patient, level 3  (In-House)

## 2021-05-18 NOTE — PROGRESS NOTES
Juan Mohan  1980     Office/Outpatient Visit    Visit Date: Sat, Nov 14, 2020 11:36 am    Provider: Dee Pendleton MD (Assistant: Windy Bautista, )    Location: [facility]        Electronically signed by Dee Pendleton MD on  11/16/2020 01:59:57 PM                             Subjective:        CC: Mr. Mohan is a 40 year old White male.  DOXIMITY [phone]         HPI:       Juan was dx with covid 6 weeks ago and has had sinus pain and headaches since then with ongoing fatigue and feeling drained.  Sinus pain is moderate and constant, no fevers, sinus feel dry.  No cough or ST.  He did lose his tastes and sense of smell but they are back to normal.       ROS:     CONSTITUTIONAL:  Positive for fatigue.   Negative for chills or fever.      CARDIOVASCULAR:  Negative for chest pain and pedal edema.      RESPIRATORY:  Negative for recent cough, dyspnea and frequent wheezing.      GASTROINTESTINAL:  Negative for abdominal pain, heartburn, constipation, diarrhea, and stool changes.      INTEGUMENTARY:  Negative for rash.      NEUROLOGICAL:  Positive for headaches.   Negative for dizziness, paresthesias or weakness.      ENDOCRINE:  Negative for hair loss, polydipsia and polyphagia.      ALLERGIC/IMMUNOLOGIC:  Positive for seasonal allergies.      PSYCHIATRIC:  Positive for insomnia (take melatonin - working ok).   Negative for anxiety, depression or suicidal thoughts.          Past Medical History / Family History / Social History:         Last Reviewed on 11/14/2020 01:00 PM by Dee Pendleton    Past Medical History:         Fracture(s): L RAD/ULNA, R CLAVICLE;         PAST MEDICAL HISTORY             PREVENTIVE HEALTH MAINTENANCE             DENTAL CLEANING: was last done 2018     EYE EXAM: was last done 10/2019         Surgical History:         Positive for    Appendectomy and    Fracture(s):    fracture of radius/ulna: dx'd at age 17; left; ;     Positive for     Colonoscopy ( --NEG;; );     Procedures:    EGD ( 3- )         Family History:         Positive for Coronary Artery Disease and Hypertension.      Positive for Type 2 Diabetes.  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          Alcohol: Frequency:    OCCASIONAL;     Caffeine:  He admits to consuming caffeine via -LITTLE.          Substance Abuse History:     Last Reviewed on 2020 09:07 AM by Josy Vinson    NEGATIVE         Mental Health History:     Last Reviewed on 2020 09:07 AM by Josy Vinson        Generalized Anxiety Disorder         Communicable Diseases (eg STDs):     Last Reviewed on 2020 09:07 AM by Josy Vinson    Reportable health conditions; NEGATIVE         Allergies:     Last Reviewed on 10/06/2020 03:40 PM by Marya Flores    Levaquin: Rash     Wellbutrin XL: hives     Lisinopril:   (Adverse Reaction)    Penicillins:      Codeine Phosphate:      Keflex:      Codeine Sulfate:          Current Medications:     Last Reviewed on 10/06/2020 03:40 PM by Marya Flores    amLODIPine 5 mg oral tablet [TAKE ONE TABLET BY MOUTH DAILY]    hydroCHLOROthiazide 25 mg oral tablet [take 1 tablet (25 mg) by oral route once per day]     fluticasone propionate 50 mcg/actuation Intranasal Spray, Suspension [inhale 1 spray (50 mcg) in each nostril by intranasal route 1 times per day]        Objective:        Exams:     PHYSICAL EXAM:     GENERAL: Vitals recorded well developed, well nourished;  well groomed;  no apparent distress;     EYES: PERRL, EOMI     RESPIRATORY: normal respiratory rate and pattern with no distress;     NEUROLOGIC: mental status: oriented to person, place, and time;  GROSSLY INTACT     PSYCHIATRIC:  appropriate affect and demeanor; normal speech pattern; grossly normal memory;         Assessment:         J01.00   Acute maxillary sinusitis, unspecified       J01.10   Acute frontal sinusitis, unspecified       J30.9   Allergic rhinitis, unspecified           ORDERS:         Meds Prescribed:       [Refilled] Medrol (Scotty) 4 mg oral Tablet, Dose Pack [Take as directed with food], #1 (one) packet, Refills: 0 (zero)         Lab Orders:       [id number]  COVID 19 Testing  (Send-Out)                      Plan:         Acute maxillary sinusitis, unspecified    LABORATORY:  Labs ordered to be performed today include COVID 19 Testing.      RECOMMENDATIONS given include: Push Fluids, Rest, Follow up if no improvement or worsening symptoms like high fevers, vomiting, weakness, or increasing shortness of air.    .            Orders:       [id number]  COVID 19 Testing  (Send-Out)              Acute frontal sinusitis, unspecified    Telehealth: Verbal consent obtained for visit to occur via televideo conferencing; Staff, other than provider, present during telephone visit include only Dr Hammond was present during the telehealth OV with patient           Prescriptions:       [Refilled] Medrol (Scotty) 4 mg oral Tablet, Dose Pack [Take as directed with food], #1 (one) packet, Refills: 0 (zero)         Allergic rhinitis, unspecifiednew heating and air, got rid of their carpets.  they do have a dog, told to look for mold in house            Charge Capture:          Primary Diagnosis:     J01.00  Acute maxillary sinusitis, unspecified           Orders:      [id number]  Office/outpatient visit; established patient, level 3  (In-House)              J01.10  Acute frontal sinusitis, unspecified     J30.9  Allergic rhinitis, unspecified         ADDENDUMS:      ____________________________________    Addendum: 11/23/2020 01:34 PM - Dee Pendleton        CC: sinus pain and drainage.[facility]

## 2021-05-18 NOTE — PROGRESS NOTES
Juan Mohan  1980     Office/Outpatient Visit    Visit Date: Wed, Dec 9, 2020 08:23 am    Provider: Thomas Almaguer N.P. (Assistant: Marya Flores MA)    Location: [facility]        Electronically signed by Thomas Almaguer N.P. on  12/09/2020 09:24:12 AM                             Subjective:        CC: Mr. Mohan is a 40 year old White male.  Congestion, sore throat for a couple days. Doximity [phone]         HPI:           Patient complains of acute upper respiratory infection, unspecified.  These have been present since yesterday.  The symptoms include nasal congestion, nasal discharge and sinus pain/pressure.  He denies body aches, chest congestion, Chills, cough, ear complaints, fever, headache or sputum production.  He denies exposure to ill contacts.  He has already tried to relieve the symptoms with mixed cold/sinus preparations and Net pot.  Medical history is significant for Had Covid about 1.5 months ago, symtom lost taste and smell  x 2 weeks.      ROS:     CONSTITUTIONAL:  Negative for chills, fatigue, fever, and weight change.      E/N/T:  Positive for nasal congestion, sinus pressure and sore throat ( acute; never had strep ).   Negative for diminished hearing.      CARDIOVASCULAR:  Negative for chest pain, palpitations, tachycardia, orthopnea, and edema.      RESPIRATORY:  Negative for recent cough, dyspnea and frequent wheezing.      GASTROINTESTINAL:  Negative for abdominal pain, constipation, diarrhea, nausea and vomiting.      PSYCHIATRIC:  Negative for anxiety, depression, and sleep disturbances.          Past Medical History / Family History / Social History:         Last Reviewed on 12/09/2020 09:19 AM by Thomas Almaguer    Past Medical History:         Fracture(s): L RAD/ULNA, R CLAVICLE;         PAST MEDICAL HISTORY             PREVENTIVE HEALTH MAINTENANCE             DENTAL CLEANING: was last done 2018     EYE EXAM: was last done 10/2019          Surgical History:         Positive for    Appendectomy and    Fracture(s):    fracture of radius/ulna: dx'd at age 17; left; ;     Positive for    Colonoscopy ( --NEG;; );     Procedures:    EGD ( 3- )         Family History:         Positive for Coronary Artery Disease and Hypertension.      Positive for Type 2 Diabetes.  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          Alcohol: Frequency:    OCCASIONAL;     Caffeine:  He admits to consuming caffeine via -LITTLE.          Current Problems:     Last Reviewed on 2020 09:19 AM by Thomas Almaguer    Nicotine dependence, unspecified, in remission    Dysthymic disorder    Vitamin D deficiency, unspecified    Pure hyperglyceridemia    Essential (primary) hypertension    Cardiac arrhythmia, unspecified    Insomnia, unspecified    Chest pain, unspecified    Encounter for general adult medical examination without abnormal findings    Encounter for immunization    Allergic rhinitis, unspecified    Cervicalgia    Acute maxillary sinusitis, unspecified    Acute frontal sinusitis, unspecified    Acute upper respiratory infection, unspecified        Immunizations:     influenza, injectable, quadrivalent,  preservative free 10/23/2019    influenza, injectable, quadrivalent, preservative free (FLUZONE QUAD [id number]) 9/21/2020    zzFluzone pf-quadrivalent 3 and up 9/8/2017    Fluzone Quadrivalent (3+ years) 9/8/2016    Influenza Virus Vaccine pf (adult) 9/1/2018        Allergies:     Last Reviewed on 12/09/2020 09:19 AM by Thomas Almaguer    Levaquin: Rash     Wellbutrin XL: hives     Lisinopril:   (Adverse Reaction)    Penicillins:      Keflex:      Codeine Sulfate:          Current Medications:     Last Reviewed on 12/09/2020 09:19 AM by Thomas Almaguer    amLODIPine 5 mg oral tablet [TAKE ONE TABLET BY MOUTH DAILY]    hydroCHLOROthiazide 25 mg oral tablet [take 1 tablet (25 mg) by oral route once per day]    fluticasone propionate 50 mcg/actuation Intranasal Spray, Suspension [inhale 1 spray (50 mcg) in each nostril by intranasal route 1 times per day]        Objective:        Exams:     PHYSICAL EXAM:     GENERAL: no apparent distress;     NEUROLOGIC: GROSSLY INTACT     PSYCHIATRIC:  appropriate affect and demeanor; normal speech pattern; grossly normal memory;         Assessment:         J06.9   Acute upper respiratory infection, unspecified           ORDERS:         Meds Prescribed:       [New Rx] azithromycin 250 mg oral tablet [take 1 tablet (250 mg) take 2 today and 1 tablet daily x 4 days], #6 (six) tablets, Refills: 0 (zero)                 Plan:         Acute upper respiratory infection, unspecified        RECOMMENDATIONS given include: increase fluid intake and rest.  Telehealth: Verbal consent obtained for visit to occur via phone call; Staff, other than provider, present during telephone visit include Juan Chin pt; Total time spent was 8 minutes; 69342--Tcettbctn E/M 5-10 minutes     FOLLOW-UP: Advised to call if there is no improvement Follow-up by phone if no improvement in 1 week..  :Schedule follow-up appointments on a p.r.n. basis.:.  Instructed to call  immediately if he develops new or worsening symptoms, such as cough and fever.            Prescriptions:       [New Rx] azithromycin 250 mg oral tablet [take 1 tablet (250 mg) take 2 today and 1 tablet daily x 4 days], #6 (six) tablets, Refills: 0 (zero)             Patient Recommendations:        For  Acute upper respiratory infection, unspecified:    Drink plenty of fluids.  Fever increases the loss of fluids and can lead to dehydration. Get plenty of rest.  Follow-up by phone if no improvement in 1 week. Schedule follow-up appointments as needed.                APPOINTMENT INFORMATION:        Monday Tuesday Wednesday Thursday Friday Saturday Sunday            Time:___________________AM  PM   Date:_____________________             Charge Capture:         Primary Diagnosis:     J06.9  Acute upper respiratory infection, unspecified           Orders:      [id number]  Phys/QHP telephone evaluation 5-10 min  (In-House)

## 2021-05-18 NOTE — PROGRESS NOTES
Juan Mhoan 1980     Office/Outpatient Visit    Visit Date: Wed, Aug 28, 2019 06:34 pm    Provider: Vidya Rincon N.P. (Assistant: Rika Nunez MA)    Location: [facility]        Electronically signed by Vidya Rincon N.P. on  08/28/2019 07:08:37 PM                             SUBJECTIVE:        CC:     Mr. Mohan is a 39 year old White male.  presents today due to complaints of sinus pressure, congestion X 3 days         HPI:         Upper respiratory illness noted.  These have been present for the past 3 days.  The symptoms include ear complaints, headache, sinus pain/pressure and runny nose.  He has already tried to relieve the symptoms with antihistamines ( Allegra ).          Additionally, he presents with history of essential hypertension.  his current cardiac medication regimen includes a calcium channel blocker ( Amlodipine ).  He did not bring his blood pressure diary, but says that typical readings show systolics in the 120s and diastolics in the 80s.      ROS:     CONSTITUTIONAL:  Negative for chills and fever.      EYES:  Negative for blurred vision and eye drainage.      E/N/T:  Positive for ear pain, sinus pressure, sore throat and post nasal drip.      CARDIOVASCULAR:  Negative for chest pain and palpitations.      RESPIRATORY:  Negative for dyspnea and frequent wheezing.          PMH/FMH/SH:     Last Reviewed on 7/03/2019 01:56 PM by Josy Vinson    Past Medical History:         Fracture(s): L RAD/ULNA, R CLAVICLE;         PAST MEDICAL HISTORY             CURRENT MEDICAL PROVIDERS:    [facility]         Surgical History:         Positive for    Appendectomy and    Fracture(s):    fracture of radius/ulna: dx'd at age 17; left; ;     Positive for    Colonoscopy ( 2007--NEG;; );     Procedures:    EGD ( 3-2013 )         Family History:         Positive for Coronary Artery Disease and Hypertension.      Positive for Type 2 Diabetes.  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          Alcohol: Frequency:    OCCASIONAL;     Caffeine:  He admits to consuming caffeine via -LITTLE.          Substance Abuse History:     Last Reviewed on 2017 02:25 PM by Cherelle Russ    NEGATIVE         Mental Health History:     Last Reviewed on 2017 02:25 PM by Cherelle Russ        Communicable Diseases (eg STDs):     Last Reviewed on 2017 02:25 PM by Cherelle Russ            Current Problems:     Last Reviewed on 2019 01:56 PM by Josy Vinson    Serous otitis media     Essential hypertension     Hyperlipidemia     Degenerative disc disease - C Spine     Vitamin D deficiency, unspecified     Anxiety with depression     Allergies     Chronic low back pain     History of GERD     Cigarette smoking     Arm pain         Immunizations:     zzFluzone pf-quadrivalent 3 and up 2017     Fluzone Quadrivalent (3+ years) 2016     Influenza Virus Vaccine pf (adult) 2018         Allergies:     Last Reviewed on 2019 01:21 PM by Whit Stahl    Levaquin: rash    Wellbutrin XL: hives    Lisinopril: (Adverse Reaction)    Penicillins:    Keflex:    Codeine Sulfate:        Current Medications:     Last  Reviewed on 7/03/2019 01:26 PM by Whit Stahl    Amlodipine  5mg Tablet 1 tab daily     Albuterol 90mcg/1actuation Oral Inhaler 2 puff  QID  PRN     Diclofenac Sodium 75mg Tablets, Enteric Coated 1 tab bid with food         OBJECTIVE:        Vitals:         Historical:     07/03/2019  BP:   128/88 mm Hg ( (right arm, , sitting, );)     03/22/2019  BP:   134/84 mm Hg ( (left arm, , sitting, );)         Current: 8/28/2019 6:38:55 PM    Ht:  6 ft, 0 in;  Wt: 251.2 lbs;  BMI: 34.1    T: 98.4 F (oral);  BP: 145/97 mm Hg (right arm, sitting);  P: 86 bpm (right arm (BP Cuff), sitting);  sCr: 1.05 mg/dL;  GFR: 109.05        Repeat:     6:39:08 PM     BP:   142/95mm Hg (right arm, sitting)         Exams:     PHYSICAL EXAM:     GENERAL: well developed, well nourished;  no apparent distress;     EYES: PERRL, EOMI     E/N/T: EARS: external auditory canal normal;  both TMs are have fluid behind them;  NOSE: normal turbinates; bilateral maxillary sinus tenderness present; OROPHARYNX: oral mucosa is normal; posterior pharynx shows no exudate and post nasal drip;     NECK: range of motion is normal; trachea is midline;     RESPIRATORY: normal respiratory rate and pattern with no distress; normal breath sounds with no rales, rhonchi, wheezes or rubs;     CARDIOVASCULAR: normal rate; rhythm is regular;     MUSCULOSKELETAL: normal gait;     NEUROLOGIC: mental status: alert and oriented x 3; GROSSLY INTACT     PSYCHIATRIC: appropriate affect and demeanor;         ASSESSMENT           381.81   H69.83  Dysfunction of eustachian tube              DDx:     401.1   I10  Essential hypertension              DDx:         ORDERS:         Meds Prescribed:       Fluticasone Propionate 50mcg/1actuation Nasal Spray 1 spray each nostil daily  #16 (Sixteen) gm Refills: 0       Prednisone 10mg Tablet take 6 pills today, 5 pills tomorrow, 4 pills day 3, 3 pills day 4, 2 pills day 5 and 1 pill day 6  #21 (Twenty One) tablet(s) Refills: 0                  PLAN:          Dysfunction of eustachian tube         RECOMMENDATIONS given include: Push Fluids, Rest, Follow up if no improvement or worsening symptoms like high fevers, vomiting, weakness, or increasing shortness of air.    .      FOLLOW-UP: Advised to call if there is no improvement 5 days.  Will consider antibiotics at that time           Prescriptions:       Fluticasone Propionate 50mcg/1actuation Nasal Spray 1 spray each nostil daily  #16 (Sixteen) gm Refills: 0       Prednisone 10mg Tablet take 6 pills today, 5 pills tomorrow, 4 pills day 3, 3 pills day 4, 2 pills day 5 and 1 pill day 6  #21 (Twenty One) tablet(s) Refills: 0          Essential hypertension BP elevated today. May be due to illness. Advised BP log and follow up with PCP if remains greater than 140/90.             Patient Recommendations:        For  Dysfunction of eustachian tube:     Follow-up by phone if no improvement in 5 days.              CHARGE CAPTURE           **Please note: ICD descriptions below are intended for billing purposes only and may not represent clinical diagnoses**        Primary Diagnosis:         381.81 Dysfunction of eustachian tube            H69.83    Other specified disorders of Eustachian tube, bilateral              Orders:          [id number]   Office/outpatient visit; established patient, level 4  (In-House)           401.1 Essential hypertension            I10    Essential (primary) hypertension

## 2021-05-18 NOTE — PROGRESS NOTES
Juan Mohan. 1980     Office/Outpatient Visit    Visit Date: Fri, Mar 22, 2019 08:30 am    Provider: Josy Vinson N.P. (Assistant: Sarah Spurling, MA)    Location: [facility]        Electronically signed by Josy Vinson N.P. on  03/22/2019 09:01:51 AM                             SUBJECTIVE:        CC:     Mr. Mohan is a 39 year old White male.  This is a follow-up visit.  med refills         HPI:         Patient to be evaluated for essential hypertension.  This was first diagnosed several years ago.  Current nonpharmacologic treatment includes exercise.  He has not kept a blood pressure diary, but states that typical readings show Borderline.  He is tolerating the medication well without side effects.  Compliance with treatment has been good.          Additionally, he presents with history of insomnia.  this has been noted for the past several years.  Sleep has been disrupted by a delay in initiation of sleep.  Associated symptoms include anxiety.  He denies symptoms of snoring.  There are no current identifiable stressors.  Medical history is positive for anxiety/stress and nicotine use.  History is negative for sleep apnea.  Current medications include has tried melatonin, doxepin, trazodone and felt like nothing helped.      ROS:     CONSTITUTIONAL:  Negative for chills, fatigue and fever.      CARDIOVASCULAR:  Negative for chest pain, orthopnea, paroxysmal nocturnal dyspnea and pedal edema.      RESPIRATORY:  Negative for dyspnea and cough.      GASTROINTESTINAL:  Negative for abdominal pain, heartburn, constipation, diarrhea, and stool changes.      MUSCULOSKELETAL:  Positive for back pain ( chronic ).      PSYCHIATRIC:  Positive for anxiety and insomnia.   Negative for depression, feelings of stress, difficulty concentrating, sadness or suicidal thoughts.          PMH/FMH/SH:     Last Reviewed on 3/22/2019 08:58 AM by Josy Vinson    Past Medical History:          Fracture(s): L RAD/ULNA, R CLAVICLE;         PAST MEDICAL HISTORY             CURRENT MEDICAL PROVIDERS:    [facility]         Surgical History:         Positive for    Appendectomy and    Fracture(s):    fracture of radius/ulna: dx'd at age 17; left; ;     Positive for    Colonoscopy ( --NEG;; );     Procedures:    EGD ( 3- )         Family History:         Positive for Coronary Artery Disease and Hypertension.      Positive for Type 2 Diabetes.  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          Alcohol: Frequency:    OCCASIONAL;     Caffeine:  He admits to consuming caffeine via -LITTLE.          Substance Abuse History:     Last Reviewed on 2017 02:25 PM by Cherelle Russ    NEGATIVE         Mental Health History:     Last Reviewed on 2017 02:25 PM by Cherelle Russ        Communicable Diseases (eg STDs):     Last Reviewed on 2017 02:25 PM by Cherelle Russ            Current Problems:     Last Reviewed on 3/22/2019 08:58 AM by Josy Vinson    Essential hypertension     Hyperlipidemia     Degenerative disc disease - C Spine     Vitamin D deficiency, unspecified     Anxiety with depression     Allergies     Chronic low back  pain     History of GERD     Cigarette smoking     Insomnia         Immunizations:     zzFluzone pf-quadrivalent 3 and up 9/8/2017     Fluzone Quadrivalent (3+ years) 9/8/2016     Influenza Virus Vaccine pf (adult) 9/1/2018         Allergies:     Last Reviewed on 3/22/2019 08:31 AM by Spurling, Sarah C    Levaquin: rash    Wellbutrin XL: hives    Lisinopril: (Adverse Reaction)    Penicillins:    Keflex:    Codeine Sulfate:        Current Medications:     Last Reviewed on 3/22/2019 08:33 AM by Spurling, Sarah C    Amlodipine  5mg Tablet 1 tab daily     Albuterol 90mcg/1actuation Oral Inhaler 2 puff  QID  PRN         OBJECTIVE:        Vitals:         Current: 3/22/2019 8:35:21 AM    Ht:  6 ft, 0 in;  Wt: 249.4 lbs;  BMI: 33.8    T: 98.5 F (oral);  BP: 134/84 mm Hg (left arm, sitting);  P: 68 bpm (left arm (BP Cuff), sitting);  sCr: 1.1 mg/dL;  GFR: 103.78        Exams:     PHYSICAL EXAM:     GENERAL: Vitals recorded well developed, well nourished;  well groomed;  no apparent distress;     NECK:  supple, full ROM; no thyromegaly; no carotid bruits;     RESPIRATORY: normal respiratory rate and pattern with no distress; normal breath sounds with no rales, rhonchi, wheezes or rubs;     CARDIOVASCULAR: normal rate; rhythm is regular;  normal S1; normal S2; no systolic murmur; no cyanosis; no edema;     MUSCULOSKELETAL:  Normal range of motion, strength and tone;     NEUROLOGICAL:  cranial nerves, motor and sensory function, reflexes, gait and coordination are all intact;     PSYCHIATRIC:  appropriate affect and demeanor; normal speech pattern; grossly normal memory;         ASSESSMENT:           401.1   I10  Essential hypertension              DDx:     780.52   G47.00  Insomnia              DDx:         ORDERS:         Meds Prescribed:       Hydroxyzine HCl 50mg Tablet 1-2 tabs po qhs prn insomnia  #60 (Sixty) tablet(s) Refills: 0       Refill of: Amlodipine  5mg Tablet 1 tab daily  #30 (Thirty) tablet(s) Refills: 5          Lab Orders:       [id number]  HTN - [facility] CMP AND LIPID: [id number], [id number]  (Send-Out)                   PLAN:          Essential hypertension     LABORATORY:  Labs ordered to be performed today include HTN/Lipid Panel: CMP, Lipid.            Prescriptions:       Refill of: Amlodipine  5mg Tablet 1 tab daily  #30 (Thirty) tablet(s) Refills: 5           Orders:       [id number]  HTNLP - [facility] CMP AND LIPID: [id number], [id number]  (Send-Out)            Insomnia           Prescriptions:       Hydroxyzine HCl 50mg Tablet 1-2 tabs po qhs prn insomnia  #60 (Sixty) tablet(s) Refills: 0           Patient Education Handouts:       Insomnia              CHARGE CAPTURE:           Primary Diagnosis:     401.1 Essential hypertension            I10    Essential (primary) hypertension              Orders:          [id number]   Office/outpatient visit; established patient, level 4  (In-House)           780.52 Insomnia            G47.00    Insomnia, unspecified

## 2021-05-18 NOTE — PROGRESS NOTES
Juan Mohan 1980     Preventive Medicine Services    Visit Date: Mon, Sep 21, 2020 8:53 am    Provider: Josy Vinson N.P. (Assistant: Celina Paez MA )    Location:         Electronically signed by Josy Vinson N.P. on  09/21/2020 10:10:31 AM                             Subjective:        CC: Mr. Mohan is a 40 year old White male.  This is a follow-up visit.  med refill pt states he is not taking venlafaxine and hydroxyzine        HPI:           In regard to the encounter for general adult medical examination without abnormal findings, he cannot recall when he last had a physical exam.  A hearing test was done <1 year ago and threshold shift in left ear.  Depression screen is performed and is negative.  He is current with his influenza immunization.            Mr. Mohan presents with essential (primary) hypertension.  Compliance with treatment has been good.  He is tolerating the medication well without side effects.  He did not bring his blood pressure diary, but says that pressures have been okay.            Complaint of nicotine dependence, unspecified, uncomplicated..  Juan reports today that he recently quit smoking  He quit in July 2020  cold turkey - does have a problem with his weight but not sure that it is smoking related- has been off and on for years           In regard to the dysthymic disorder, his anxiety disorder was originally diagnosed many years ago.  continues with anxiety - stopped taking venlaxine and Hydroxyzine  thought to be cause chest pain and associated symtpoms  Went to ER at [facility] in March - abnormal EKG  referred to Cardiology  Went to one appt  supposed  to follow up with stress test  but had  training  was unable to make a follow up or for the tests           With regard to the pure hyperglyceridemia, compliance with treatment has been good.  He specifically denies associated symptoms, including muscle pain and weakness.            Complaint  of insomnia, unspecified..  currently not taking any medications           Complaint of cardiac arrhythmia, unspecified..  3/2020 EKG from [facility] with significant arrhythmia  referred to cardiology  Went for one visit but never returned for the recommended tests  We do not have that office note available for review today     ROS:     CONSTITUTIONAL:  Positive for unintentional weight gain ( gradual ).   Negative for chills, fatigue or fever.      EYES:  Negative     E/N/T:  Positive for diminished hearing ( left ).      CARDIOVASCULAR:  Negative for chest pain and pedal edema.      RESPIRATORY:  Positive for does not think snores.   Negative for recent cough or dyspnea.      GASTROINTESTINAL:  Positive for occasional blood when wiping(happens rarely - history of hemorrhoids).   Negative for abdominal pain, constipation, diarrhea, heartburn, nausea or vomiting.      GENITOURINARY:  Negative for dysuria and change in urine stream.      MUSCULOSKELETAL:  Positive for back pain.   Negative for arthralgias or myalgias.      INTEGUMENTARY:  Negative for pruritis and rash.      NEUROLOGICAL:  Negative for dizziness, fainting, headaches and paresthesias.      ENDOCRINE:  Negative for hair loss, polydipsia and polyphagia.      ALLERGIC/IMMUNOLOGIC:  Positive for seasonal allergies.      PSYCHIATRIC:  Positive for insomnia (take melatonin - working ok).   Negative for anxiety, depression or suicidal thoughts.          Past Medical History / Family History / Social History:         Last Reviewed on 9/21/2020 09:07 AM by Josy Vinson    Past Medical History:         Fracture(s): L RAD/ULNA, R CLAVICLE;         PAST MEDICAL HISTORY             PREVENTIVE HEALTH MAINTENANCE             DENTAL CLEANING: was last done 2018     EYE EXAM: was last done 10/2019         Surgical History:         Positive for    Appendectomy and    Fracture(s):    fracture of radius/ulna: dx'd at age 17; left; ;     Positive for    Colonoscopy (  2007--NEG;; );     Procedures:    EGD ( 3- )         Family History:         Positive for Coronary Artery Disease and Hypertension.      Positive for Type 2 Diabetes.  Father: Hypertension;  Cerebrovascular Accident     Mother: Healthy     Brother(s): Healthy; 1 brother(s) total     Sister(s): 1 sister(s) total; 1 ;  gun shot     Paternal Grandfather:  at age 60's; Cause of death was liver cancer     Paternal Grandmother:  at age 87;  Coronary Artery Disease;  Type 2 Diabetes     Maternal Grandfather: Medical history unknown;      Maternal Grandmother:  at age 60's; Cause of death was dementia;  Bipolar Disorder         Social History:     Occupation: [employer] disel /p[employer]     Marital Status:      Children: 1 child         Tobacco/Alcohol/Supplements:     Last Reviewed on 2020 09:07 AM by Josy Vinson    Tobacco: He has a past history of cigarette smoking; quit date:  2020.          Alcohol: Frequency:    OCCASIONAL;     Caffeine:  He admits to consuming caffeine via -LITTLE.          Substance Abuse History:     Last Reviewed on 2020 09:07 AM by Josy Vinson    NEGATIVE         Mental Health History:     Last Reviewed on 2020 09:07 AM by Josy Vinson        Generalized Anxiety Disorder         Communicable Diseases (eg STDs):     Last Reviewed on 2020 09:07 AM by Josy Vinson    Reportable health conditions; NEGATIVE         Current Problems:     Last Reviewed on 2020 09:07 AM by Josy Vinson    Nicotine dependence, unspecified, in remission    Dysthymic disorder    Vitamin D deficiency, unspecified    Pure hyperglyceridemia    Essential (primary) hypertension    Cardiac arrhythmia, unspecified    Chest pain, unspecified    Insomnia, unspecified    Encounter for general adult medical examination without abnormal findings    Encounter for immunization    Allergic rhinitis, unspecified         Immunizations:     influenza, injectable, quadrivalent, preservative free 10/23/2019    zzFluzone pf-quadrivalent 3 and up 9/8/2017    Fluzone Quadrivalent (3+ years) 9/8/2016    Influenza Virus Vaccine pf (adult) 9/1/2018        Allergies:     Last Reviewed on 9/21/2020 09:07 AM by Josy Vinson    Levaquin: Rash     Wellbutrin XL: hives     Lisinopril:   (Adverse Reaction)    Penicillins:      Keflex:      Codeine Sulfate:      Codeine Phosphate:          Current Medications:     Last Reviewed on 9/21/2020 09:07 AM by Josy Vinson    amLODIPine 5 mg oral tablet [1 tab daily]    hydroCHLOROthiazide 25 mg oral tablet [take 1 tablet (25 mg) by oral route once per day]        Objective:        Vitals:         Historical:     3/28/2020  BP:   150/108 mm Hg ( (left arm, , sitting, );) 3/28/2020  BP:   156/118 mm Hg ( (left arm, , sitting, );)     Current: 9/21/2020 8:59:53 AM    Ht:  6 ft, 0 in;  Wt: 246 lbs;  BMI: 33.4T: 96.1 F (temporal);  BP: 127/88 mm Hg (right arm, sitting);  P: 59 bpm (right arm (BP Cuff), sitting);  sCr: 1.05 mg/dL;  GFR: 107.06        Exams:     PHYSICAL EXAM:     GENERAL: Vitals recorded well developed, well nourished;  no apparent distress;     EYES: PERRL, EOMI     E/N/T: EARS: external auditory canal normal bilaterally and draining on the left;  bilateral TMs are normal;  NOSE:  normal nasal mucosa, septum, turbinates, and sinuses; OROPHARYNX:  normal mucosa, dentition, gingiva, and posterior pharynx;     NECK: range of motion is normal;     RESPIRATORY: normal appearance and symmetric expansion of chest wall; normal respiratory rate and pattern with no distress; normal breath sounds with no rales, rhonchi, wheezes or rubs;     CARDIOVASCULAR: normal rate; rhythm is regular;  no edema;     GASTROINTESTINAL: nontender; normal bowel sounds; no organomegaly;     LYMPHATIC: no enlargement of cervical or facial nodes; no supraclavicular nodes;     MUSCULOSKELETAL: normal gait; normal  range of motion of all major muscle groups; no limb or joint pain with range of motion;     NEUROLOGIC: mental status: alert and oriented x 3;     PSYCHIATRIC: appropriate affect and demeanor; normal speech pattern; normal thought and perception;         Assessment:         Z00.00   Encounter for general adult medical examination without abnormal findings       Z23   Encounter for immunization       I10   Essential (primary) hypertension       F17.201   Nicotine dependence, unspecified, in remission       F34.1   Dysthymic disorder       E78.1   Pure hyperglyceridemia       G47.00   Insomnia, unspecified       I49.9   Cardiac arrhythmia, unspecified       J30.9   Allergic rhinitis, unspecified           ORDERS:         Meds Prescribed:       [Refilled] amLODIPine 5 mg oral tablet [1 tab daily], #90 (ninety) tablets, Refills: 1 (one)       [Refilled] hydroCHLOROthiazide 25 mg oral tablet [take 1 tablet (25 mg) by oral route once per day], #90 (ninety) tablets, Refills: 1 (one)       [New Rx] fluticasone propionate 50 mcg/actuation Intranasal Spray, Suspension [inhale 1 spray (50 mcg) in each nostril by intranasal route 1 times per day], #16 (sixteen) grams, Refills: 1 (one)         Lab Orders:       [id number]  [facility] PHYSICAL: CMP, CBC, TSH, LIPID: [id number], [id number], [id number], [id number]  (Send-Out)              Procedures Ordered:       [id number]  Immunization administration; one vaccine  (In-House)              Other Orders:       [id number]  Influenza virus vaccine, quadrivalent, split virus, preservative free 3 years of age & older  (In-House)                      Plan:         Encounter for general adult medical examination without abnormal findings    LABORATORY:  Labs ordered to be performed today include PHYSICAL PANEL; CMP, CBC, TSH, LIPID.            Orders:       [id number]  [facility] PHYSICAL: CMP, CBC, TSH, LIPID: [id number], [id number], [id number], [id number]  (Send-Out)              Encounter for immunization          Immunizations:       59129   Immunization administration; one vaccine  (In-House)            87203  Influenza virus vaccine, quadrivalent, split virus, preservative free 3 years of age & older  (In-House)                Dose (ml): 0.5  Site: right deltoid  Route: intramuscular  Administered by: Celina Paez          : [employer]  Lot #: [id number]  Exp: 06/30/2021          NDC: [address]-88        Essential (primary) hypertension          Prescriptions:       [Refilled] amLODIPine 5 mg oral tablet [1 tab daily], #90 (ninety) tablets, Refills: 1 (one)       [Refilled] hydroCHLOROthiazide 25 mg oral tablet [take 1 tablet (25 mg) by oral route once per day], #90 (ninety) tablets, Refills: 1 (one)         Nicotine dependence, unspecified, in remissioncontinue with smoking cessation - if need medication assistance, do not hesitate to call recommend lozenges as well        Dysthymic disorderstable now  follow up PRN        Pure hyperglyceridemialabs today for monitoring        Insomnia, unspecifiedcontinue OTC melatonin - may consider sleep study         Cardiac arrhythmia, unspecifiedWe will get records from cardiology visit - I do recommedn that he have the testing that they recommended due to his history          Allergic rhinitis, unspecifiedDiscussed with Juan that he may need flonase - will  send in Rx   Saline spray 2-3 times per day PRN           Prescriptions:       [New Rx] fluticasone propionate 50 mcg/actuation Intranasal Spray, Suspension [inhale 1 spray (50 mcg) in each nostril by intranasal route 1 times per day], #16 (sixteen) grams, Refills: 1 (one)             Charge Capture:         Primary Diagnosis:     Z00.00  Encounter for general adult medical examination without abnormal findings           Orders:      [id number]  Preventive medicine, established patient, age 18-39 years  (In-House)              Z23  Encounter for immunization           Orders:      [id number]  Immunization administration; one vaccine  (In-House)             [id number]  Influenza virus vaccine, quadrivalent, split virus, preservative free 3 years of age & older  (In-House)              I10  Essential (primary) hypertension     F17.201  Nicotine dependence, unspecified, in remission     F34.1  Dysthymic disorder     E78.1  Pure hyperglyceridemia     G47.00  Insomnia, unspecified     I49.9  Cardiac arrhythmia, unspecified     J30.9  Allergic rhinitis, unspecified

## 2021-05-18 NOTE — PROGRESS NOTES
Juan Mohan  1980     Office/Outpatient Visit    Visit Date: Tue, Jan 7, 2020 05:33 pm    Provider: Josy Vinson N.P. (Assistant: Windy Bautista, )    Location: [facility]        Electronically signed by Josy Vinson N.P. on  01/07/2020 06:10:07 PM                             Subjective:        CC: Mr. Mohan is a 39 year old White male.  This is a follow-up visit.  med refills         HPI:           Patient presents with essential (primary) hypertension.  His current cardiac medication regimen includes a calcium channel blocker ( amlodipine ).            The symptom began 2 months ago.  The severity is of moderate intensity.  He estimates that the frequency of this symptom is nearly hourly.  Aggravating factors include lifting.  Symptoms are relieved with denies using any ice or heat.      ROS:     CONSTITUTIONAL:  Negative for chills, fatigue and fever.      CARDIOVASCULAR:  Negative for chest pain and pedal edema.      RESPIRATORY:  Negative for recent cough and dyspnea.      GASTROINTESTINAL:  Negative for abdominal pain, constipation, diarrhea, heartburn, nausea and vomiting.      GENITOURINARY:  Negative for dysuria and change in urine stream.      MUSCULOSKELETAL:  Positive for joint stiffness (left shoulder).   Negative for arthralgias or myalgias.      INTEGUMENTARY:  Negative for pruritis and rash.      NEUROLOGICAL:  Negative for dizziness, fainting, headaches and paresthesias.      ENDOCRINE:  Negative for hair loss, polydipsia and polyphagia.      ALLERGIC/IMMUNOLOGIC:  Negative for seasonal allergies.      PSYCHIATRIC:  Negative for anxiety, depression and suicidal thoughts.          Past Medical History / Family History / Social History:         Last Reviewed on 7/03/2019 01:56 PM by Josy Vinson    Past Medical History:         Fracture(s): L RAD/ULNA, R CLAVICLE;         PAST MEDICAL HISTORY             CURRENT MEDICAL PROVIDERS:    [facility]         Surgical History:         Positive for    Appendectomy and    Fracture(s):    fracture of radius/ulna: dx'd at age 17; left; ;     Positive for    Colonoscopy ( --NEG;; );     Procedures:    EGD ( 3- )         Family History:         Positive for Coronary Artery Disease and Hypertension.      Positive for Type 2 Diabetes.  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          Alcohol: Frequency:    OCCASIONAL;     Caffeine:  He admits to consuming caffeine via -LITTLE.          Substance Abuse History:     Last Reviewed on 2017 02:25 PM by Cherelle Russ    NEGATIVE         Mental Health History:     Last Reviewed on 2017 02:25 PM by Cherelle Russ        Communicable Diseases (eg STDs):     Last Reviewed on 2017 02:25 PM by Cherelle Russ        Current Problems:     Last Reviewed on 2019 01:56 PM by Jsoy Vinson    History of GERD    Nicotine dependence, unspecified, uncomplicated    Chronic low back pain    Allergies    Dysthymic disorder    Anxiety with depression    Vitamin D deficiency, unspecified    Degenerative disc disease - C Spine    Hyperlipidemia    Essential (primary) hypertension    Pure hyperglyceridemia     Acute serous otitis media, left ear    Pain in left shoulder        Immunizations:     zzFluzone pf-quadrivalent 3 and up 9/8/2017    Fluzone Quadrivalent (3+ years) 9/8/2016    Influenza Virus Vaccine pf (adult) 9/1/2018    influenza, injectable, quadrivalent, preservative free 10/23/2019        Allergies:     Last Reviewed on 1/07/2020 05:35 PM by Windy Bautista    Levaquin: Rash     Wellbutrin XL: hives     Lisinopril:   (Adverse Reaction)    Penicillins:      Keflex:      Codeine Sulfate:          Current Medications:     Last Reviewed on 1/07/2020 05:36 PM by Windy Bautista    Albuterol 90mcg/1actuation Oral Inhaler [2 puff  QID  PRN]    Amlodipine  5 mg oral tablet [1 tab daily]        Objective:        Vitals:         Current: 1/7/2020 5:35:36 PM    Ht:  6 ft, 0 in;  Wt: 253 lbs;  BMI: 34.3T: 98.1 F (oral);  BP: 137/84 mm Hg (left arm, sitting);  P: 83 bpm (left arm (BP Cuff), sitting);  sCr: 1.05 mg/dL;  GFR: 109.38        Exams:     PHYSICAL EXAM:     GENERAL: Vitals recorded well developed, well nourished;  no apparent distress;     RESPIRATORY: normal appearance and symmetric expansion of chest wall; normal respiratory rate and pattern with no distress; normal breath sounds with no rales, rhonchi, wheezes or rubs;     CARDIOVASCULAR: normal rate; rhythm is regular;  no edema;     GASTROINTESTINAL: nontender; normal bowel sounds; no organomegaly;     LYMPHATIC: no enlargement of cervical or facial nodes; no supraclavicular nodes;     MUSCULOSKELETAL: normal gait; decreased range of motion noted in: left shoulder flexion, extension, adduction, and abduction;  pain with range of motion in: left shoulder flexion, extension, adduction, and abduction;     NEUROLOGIC: mental status: alert and oriented x 3;     PSYCHIATRIC: appropriate affect and demeanor; normal speech pattern; normal thought and perception;         Assessment:         I10   Essential (primary) hypertension       M25.512   Pain in left  shoulder           ORDERS:         Meds Prescribed:       [Refilled] Amlodipine  5 mg oral tablet [1 tab daily], #30 (thirty) tablets, Refills: 0 (zero)       [New Rx] diclofenac sodium 75 mg oral tablet, delayed release (enteric coated) [take 1 tablet (75 mg) by oral route 2 times per day], #60 (sixty) tablets, Refills: 0 (zero)                 Plan:         Essential (primary) hypertensionP isntructed to continue to monitor blood pressure at home and call back if consistently running above 140/80        RECOMMENDATIONS given include: keep blood pressure log as directed.            Prescriptions:       [Refilled] Amlodipine  5 mg oral tablet [1 tab daily], #30 (thirty) tablets, Refills: 0 (zero)         Pain in left shoulderIf symptoms persist longer than 2 weeks call back and will set up physical therapy for evaluation.           Prescriptions:       [New Rx] diclofenac sodium 75 mg oral tablet, delayed release (enteric coated) [take 1 tablet (75 mg) by oral route 2 times per day], #60 (sixty) tablets, Refills: 0 (zero)             Patient Recommendations:        For  Essential (primary) hypertension:    Keep a daily blood pressure log and report elevated blood pressure to provider as directed.              Charge Capture:         Primary Diagnosis:     I10  Essential (primary) hypertension           Orders:      51351  Office/outpatient visit; established patient, level 3  (In-House)              M25.512  Pain in left shoulder

## 2021-05-18 NOTE — PROGRESS NOTES
Juan Mohan 1980     Office/Outpatient Visit    Visit Date: Fri, Jan 5, 2018 12:56 pm    Provider: Josy Vinson N.P. (Assistant: Mariya Yo MA)    Location: [facility]        Electronically signed by Josy Vinson N.P. on  01/09/2018 09:53:47 AM                             SUBJECTIVE:        CC:     Mr. Mohan is a 37 year old White male.  This is a follow-up visit.  on medication         HPI:         Patient to be evaluated for anxiety with depression.  His symptom complex includes light-headedness, palpitations, and shortness of breath.  True panic attacks occur in addition to generalized anxiety.  The frequency symptoms is circumstantial (phobia-related).  Apparent triggers include finanacial.  Current treatment includes Prozac.  Previous attempts at treatment have included a benzodiazepine.  He does feel that he could use a little guido dose or something to help the anxiety attacks - he does not have them as frequently, but they are still occurring     ROS:     CONSTITUTIONAL:  Negative for chills, fatigue and fever.      CARDIOVASCULAR:  Negative for chest pain and pedal edema.      RESPIRATORY:  Negative for recent cough and dyspnea.      NEUROLOGICAL:  Negative for dizziness, fainting, headaches and paresthesias.      PSYCHIATRIC:  Positive for anxiety, depression, feelings of stress and sleep disturbance.   Negative for crying spells, anhedonia, personality change, difficulty concentrating, recreational drug use, sadness or suicidal thoughts.          PMH/FMH/SH:     Last Reviewed on 1/05/2018 01:20 PM by Josy Vinson    Past Medical History:         Fracture(s): L RAD/ULNA, R CLAVICLE;         PAST MEDICAL HISTORY             CURRENT MEDICAL PROVIDERS:    [facility]         Surgical History:         Positive for    Appendectomy and    Fracture(s):    fracture of radius/ulna: dx'd at age 17; left; ;     Positive for    Colonoscopy ( 2007--NEG;; );      Procedures:    EGD ( 3-2013 )         Family History:         Positive for Coronary Artery Disease and Hypertension.      Positive for Type 2 Diabetes.  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          Alcohol: Frequency:    OCCASIONAL;     Caffeine:  He admits to consuming caffeine via -LITTLE.          Substance Abuse History:     Last Reviewed on 2017 02:25 PM by Cherelle Russ    NEGATIVE         Mental Health History:     Last Reviewed on 2017 02:25 PM by Cherelle Russ        Communicable Diseases (eg STDs):     Last Reviewed on 2017 02:25 PM by Cherelle Russ            Current Problems:     Last Reviewed on 2018 01:19 PM by oJsy Vinson    Essential hypertension     Hyperlipidemia     Degenerative disc disease - C Spine     Vitamin D deficiency, unspecified     Anxiety with depression     Allergies     Chronic low back pain     History of GERD     Cigarette smoking     Acute sinusitis, other         Immunizations:     Fluzone pf-quadrivalent 3 and up 2017     Fluzone Quadrivalent (3+ years) 2016         Allergies:     Last Reviewed on 2018 01:19 PM by Josy Vinson    Levaquin: rash    Wellbutrin XL: hives     Lisinopril: (Adverse Reaction)    Penicillins:    Keflex:    Codeine Sulfate:        Current Medications:     Last Reviewed on 1/05/2018 01:19 PM by Josy Vinson    Amlodipine  5mg Tablet 1 tab daily     Albuterol 90mcg/1actuation Oral Inhaler 2 puff  QID  PRN     Fluoxetine 20mg Capsules 1 capsule daily         OBJECTIVE:        Vitals:         Current: 1/5/2018 12:58:47 PM    Ht:  6 ft, 0 in;  Wt: 234.9 lbs;  BMI: 31.9    T: 98.6 F (oral);  BP: 145/96 mm Hg (left arm, sitting);  P: 75 bpm (left arm (BP Cuff), sitting);  sCr: 0.97 mg/dL;  GFR: 116.90        Repeat:     1:37:37 PM     BP:   134/82mm Hg (left arm, sitting)         Exams:     PHYSICAL EXAM:     GENERAL: Vitals recorded well developed, well nourished;  no apparent distress;     NECK: range of motion is normal;     RESPIRATORY: normal appearance and symmetric expansion of chest wall; normal respiratory rate and pattern with no distress; normal breath sounds with no rales, rhonchi, wheezes or rubs;     CARDIOVASCULAR: normal rate; rhythm is regular;  no edema;     MUSCULOSKELETAL: normal gait; normal range of motion of all major muscle groups; no limb or joint pain with range of motion;     NEUROLOGIC: mental status: alert and oriented x 3;     PSYCHIATRIC: appropriate affect and demeanor; normal speech pattern; normal thought and perception;         ASSESSMENT           300.4   F34.1  Anxiety with depression              DDx:         ORDERS:         Meds Prescribed:       Refill of: Fluoxetine (Fluoxetine)  20mg Capsules 1 capsule daily  #30 (Thirty) capsule(s) Refills: 5       Hydroxyzine HCl 25mg Tablet 1 tab q 8h prn anxiety  #30 (Thirty) tablet(s) Refills: 0                 PLAN:          Anxiety with depression           Prescriptions:       Refill of: Fluoxetine (Fluoxetine)  20mg Capsules 1 capsule daily  #30 (Thirty) capsule(s) Refills: 5       Hydroxyzine HCl 25mg Tablet 1 tab q 8h prn anxiety  #30 (Thirty) tablet(s) Refills: 0              CHARGE CAPTURE           **Please note: ICD descriptions below are intended for billing purposes only and may not represent clinical diagnoses**        Primary Diagnosis:         300.4 Anxiety with depression            F34.1    Dysthymic disorder              Orders:          49764   Office/outpatient visit; established patient, level 3  (In-House)

## 2021-07-01 VITALS
BODY MASS INDEX: 33.78 KG/M2 | WEIGHT: 249.4 LBS | HEART RATE: 68 BPM | TEMPERATURE: 98.5 F | SYSTOLIC BLOOD PRESSURE: 134 MMHG | HEIGHT: 72 IN | DIASTOLIC BLOOD PRESSURE: 84 MMHG

## 2021-07-01 VITALS
HEART RATE: 67 BPM | HEIGHT: 72 IN | BODY MASS INDEX: 32.59 KG/M2 | TEMPERATURE: 97.8 F | DIASTOLIC BLOOD PRESSURE: 103 MMHG | SYSTOLIC BLOOD PRESSURE: 140 MMHG | WEIGHT: 240.6 LBS

## 2021-07-01 VITALS
BODY MASS INDEX: 34.02 KG/M2 | DIASTOLIC BLOOD PRESSURE: 95 MMHG | HEIGHT: 72 IN | HEART RATE: 86 BPM | WEIGHT: 251.2 LBS | TEMPERATURE: 98.4 F | SYSTOLIC BLOOD PRESSURE: 142 MMHG

## 2021-07-01 VITALS
DIASTOLIC BLOOD PRESSURE: 82 MMHG | HEART RATE: 75 BPM | HEIGHT: 72 IN | TEMPERATURE: 98.6 F | SYSTOLIC BLOOD PRESSURE: 134 MMHG | BODY MASS INDEX: 31.82 KG/M2 | WEIGHT: 234.9 LBS

## 2021-07-01 VITALS
DIASTOLIC BLOOD PRESSURE: 88 MMHG | HEIGHT: 72 IN | WEIGHT: 248.8 LBS | SYSTOLIC BLOOD PRESSURE: 128 MMHG | OXYGEN SATURATION: 97 % | HEART RATE: 60 BPM | BODY MASS INDEX: 33.7 KG/M2 | TEMPERATURE: 98.6 F

## 2021-07-02 VITALS
OXYGEN SATURATION: 97 % | TEMPERATURE: 98.3 F | SYSTOLIC BLOOD PRESSURE: 150 MMHG | WEIGHT: 241.8 LBS | DIASTOLIC BLOOD PRESSURE: 108 MMHG | HEIGHT: 72 IN | HEART RATE: 98 BPM | BODY MASS INDEX: 32.75 KG/M2

## 2021-07-02 VITALS
HEART RATE: 71 BPM | DIASTOLIC BLOOD PRESSURE: 88 MMHG | TEMPERATURE: 96.6 F | SYSTOLIC BLOOD PRESSURE: 135 MMHG | HEIGHT: 72 IN | WEIGHT: 241.4 LBS | BODY MASS INDEX: 32.7 KG/M2

## 2021-07-02 VITALS
HEIGHT: 72 IN | DIASTOLIC BLOOD PRESSURE: 84 MMHG | WEIGHT: 253 LBS | BODY MASS INDEX: 34.27 KG/M2 | SYSTOLIC BLOOD PRESSURE: 137 MMHG | TEMPERATURE: 98.1 F | HEART RATE: 83 BPM

## 2021-07-02 VITALS
SYSTOLIC BLOOD PRESSURE: 127 MMHG | BODY MASS INDEX: 33.32 KG/M2 | HEART RATE: 59 BPM | WEIGHT: 246 LBS | HEIGHT: 72 IN | TEMPERATURE: 96.1 F | DIASTOLIC BLOOD PRESSURE: 88 MMHG

## 2021-07-08 ENCOUNTER — OFFICE VISIT (OUTPATIENT)
Dept: FAMILY MEDICINE CLINIC | Age: 41
End: 2021-07-08

## 2021-07-08 ENCOUNTER — LAB (OUTPATIENT)
Dept: LAB | Facility: HOSPITAL | Age: 41
End: 2021-07-08

## 2021-07-08 VITALS
TEMPERATURE: 98.2 F | SYSTOLIC BLOOD PRESSURE: 138 MMHG | OXYGEN SATURATION: 98 % | WEIGHT: 240 LBS | HEIGHT: 72 IN | BODY MASS INDEX: 32.51 KG/M2 | HEART RATE: 68 BPM | DIASTOLIC BLOOD PRESSURE: 92 MMHG

## 2021-07-08 DIAGNOSIS — R10.814 LEFT LOWER QUADRANT ABDOMINAL TENDERNESS WITHOUT REBOUND TENDERNESS: Primary | ICD-10-CM

## 2021-07-08 DIAGNOSIS — R10.814 LEFT LOWER QUADRANT ABDOMINAL TENDERNESS WITHOUT REBOUND TENDERNESS: ICD-10-CM

## 2021-07-08 DIAGNOSIS — K59.00 CONSTIPATION, UNSPECIFIED CONSTIPATION TYPE: ICD-10-CM

## 2021-07-08 LAB
ALBUMIN SERPL-MCNC: 5.2 G/DL (ref 3.5–5.2)
ALBUMIN/GLOB SERPL: 2 G/DL
ALP SERPL-CCNC: 63 U/L (ref 39–117)
ALT SERPL W P-5'-P-CCNC: 63 U/L (ref 1–41)
ANION GAP SERPL CALCULATED.3IONS-SCNC: 10.4 MMOL/L (ref 5–15)
AST SERPL-CCNC: 36 U/L (ref 1–40)
BASOPHILS # BLD AUTO: 0.05 10*3/MM3 (ref 0–0.2)
BASOPHILS NFR BLD AUTO: 0.7 % (ref 0–1.5)
BILIRUB SERPL-MCNC: 1.2 MG/DL (ref 0–1.2)
BILIRUB UR QL STRIP: ABNORMAL
BUN SERPL-MCNC: 14 MG/DL (ref 6–20)
BUN/CREAT SERPL: 11.8 (ref 7–25)
CALCIUM SPEC-SCNC: 10.5 MG/DL (ref 8.6–10.5)
CHLORIDE SERPL-SCNC: 100 MMOL/L (ref 98–107)
CLARITY UR: CLEAR
CO2 SERPL-SCNC: 29.6 MMOL/L (ref 22–29)
COLOR UR: ABNORMAL
CREAT SERPL-MCNC: 1.19 MG/DL (ref 0.76–1.27)
DEPRECATED RDW RBC AUTO: 39.1 FL (ref 37–54)
EOSINOPHIL # BLD AUTO: 0.08 10*3/MM3 (ref 0–0.4)
EOSINOPHIL NFR BLD AUTO: 1.1 % (ref 0.3–6.2)
ERYTHROCYTE [DISTWIDTH] IN BLOOD BY AUTOMATED COUNT: 12.1 % (ref 12.3–15.4)
GFR SERPL CREATININE-BSD FRML MDRD: 68 ML/MIN/1.73
GLOBULIN UR ELPH-MCNC: 2.6 GM/DL
GLUCOSE SERPL-MCNC: 91 MG/DL (ref 65–99)
GLUCOSE UR STRIP-MCNC: NEGATIVE MG/DL
HCT VFR BLD AUTO: 44.2 % (ref 37.5–51)
HGB BLD-MCNC: 15.8 G/DL (ref 13–17.7)
HGB UR QL STRIP.AUTO: NEGATIVE
IMM GRANULOCYTES # BLD AUTO: 0 10*3/MM3 (ref 0–0.05)
IMM GRANULOCYTES NFR BLD AUTO: 0 % (ref 0–0.5)
KETONES UR QL STRIP: NEGATIVE
LEUKOCYTE ESTERASE UR QL STRIP.AUTO: NEGATIVE
LIPASE SERPL-CCNC: 32 U/L (ref 13–60)
LYMPHOCYTES # BLD AUTO: 1.97 10*3/MM3 (ref 0.7–3.1)
LYMPHOCYTES NFR BLD AUTO: 27.8 % (ref 19.6–45.3)
MCH RBC QN AUTO: 31.2 PG (ref 26.6–33)
MCHC RBC AUTO-ENTMCNC: 35.7 G/DL (ref 31.5–35.7)
MCV RBC AUTO: 87.4 FL (ref 79–97)
MONOCYTES # BLD AUTO: 0.92 10*3/MM3 (ref 0.1–0.9)
MONOCYTES NFR BLD AUTO: 13 % (ref 5–12)
NEUTROPHILS NFR BLD AUTO: 4.06 10*3/MM3 (ref 1.7–7)
NEUTROPHILS NFR BLD AUTO: 57.4 % (ref 42.7–76)
NITRITE UR QL STRIP: NEGATIVE
PH UR STRIP.AUTO: 6 [PH] (ref 5–8)
PLATELET # BLD AUTO: 271 10*3/MM3 (ref 140–450)
PMV BLD AUTO: 10.4 FL (ref 6–12)
POTASSIUM SERPL-SCNC: 3.8 MMOL/L (ref 3.5–5.2)
PROT SERPL-MCNC: 7.8 G/DL (ref 6–8.5)
PROT UR QL STRIP: NEGATIVE
RBC # BLD AUTO: 5.06 10*6/MM3 (ref 4.14–5.8)
SODIUM SERPL-SCNC: 140 MMOL/L (ref 136–145)
SP GR UR STRIP: >=1.03 (ref 1–1.03)
UREA BREATH TEST QL: NEGATIVE
UROBILINOGEN UR QL STRIP: ABNORMAL
WBC # BLD AUTO: 7.08 10*3/MM3 (ref 3.4–10.8)

## 2021-07-08 PROCEDURE — 80053 COMPREHEN METABOLIC PANEL: CPT

## 2021-07-08 PROCEDURE — 99213 OFFICE O/P EST LOW 20 MIN: CPT | Performed by: FAMILY MEDICINE

## 2021-07-08 PROCEDURE — 83013 H PYLORI (C-13) BREATH: CPT

## 2021-07-08 PROCEDURE — 83690 ASSAY OF LIPASE: CPT

## 2021-07-08 PROCEDURE — 36415 COLL VENOUS BLD VENIPUNCTURE: CPT

## 2021-07-08 PROCEDURE — 81003 URINALYSIS AUTO W/O SCOPE: CPT

## 2021-07-08 PROCEDURE — 85025 COMPLETE CBC W/AUTO DIFF WBC: CPT

## 2021-07-08 RX ORDER — POLYETHYLENE GLYCOL 3350 17 G/17G
17 POWDER, FOR SOLUTION ORAL DAILY PRN
Qty: 527 G | Refills: 1 | Status: SHIPPED | OUTPATIENT
Start: 2021-07-08 | End: 2022-01-17

## 2021-07-08 RX ORDER — CETIRIZINE HYDROCHLORIDE 10 MG/1
10 TABLET ORAL DAILY
COMMUNITY
Start: 2021-07-05 | End: 2022-01-17

## 2021-07-08 RX ORDER — FLUTICASONE PROPIONATE 50 MCG
1 SPRAY, SUSPENSION (ML) NASAL DAILY PRN
COMMUNITY
Start: 2021-07-05 | End: 2022-04-01

## 2021-07-08 NOTE — ASSESSMENT & PLAN NOTE
Based on Juan's symptoms of nausea, vomiting and constipation coupled with his significant left lower quadrant tenderness to palpation, my suspicion is high for diverticulitis.  I am going to send him for belly labs and then proceed with CTAP with contrast and oral prep.  Work note given for today and tomorrow.

## 2021-07-08 NOTE — PROGRESS NOTES
"Chief Complaint  Sinusitis and Constipation    Subjective          Juan Mohan presents to [facility] today for acute complaint of congestion for the past 4 days.  On the 5th of July, he went to [place] urgent care.   He reports positive fatigue and malaise, negative fevers, positive chills, negative headaches, positive rhinorrhea, positive congestion, positive sore throat (resolved).  He reports positive cough, productive of no sputum, positive pleuritic chest pain, negative shortness of breath.  He has positive abdominal pain (RUQ pain), positive nausea, positive vomiting, negative diarrhea, positive for constipation, negative body aches.  He has had pain in the R back, radiating from the RUQ. Sick contacts:  None.  He is not vaccinated against COVID.  He had COVID back in October.      Objective   Vital Signs:   /92 (BP Location: Right arm, Patient Position: Sitting)   Pulse 68   Temp 98.2 °F (36.8 °C) (Oral)   Ht 182.9 cm (72.01\")   Wt 109 kg (240 lb)   SpO2 98% Comment: RA  BMI 32.54 kg/m²     Physical Exam  Vitals reviewed.   Constitutional:       General: He is not in acute distress.     Appearance: Normal appearance.   HENT:      Right Ear: Tympanic membrane, ear canal and external ear normal.      Left Ear: Tympanic membrane, ear canal and external ear normal.      Nose: Congestion and rhinorrhea present.      Right Turbinates: Swollen and pale.      Left Turbinates: Swollen and pale.      Right Sinus: Maxillary sinus tenderness present.      Left Sinus: Maxillary sinus tenderness present.   Eyes:      Extraocular Movements: Extraocular movements intact.      Pupils: Pupils are equal, round, and reactive to light.   Cardiovascular:      Rate and Rhythm: Normal rate and regular rhythm.      Heart sounds: No murmur heard.     Pulmonary:      Effort: Pulmonary effort is normal.      Breath sounds: Normal breath sounds. No wheezing, rhonchi or rales. "   Abdominal:      General: Bowel sounds are normal.      Tenderness: There is abdominal tenderness (LLQ).   Musculoskeletal:         General: Normal range of motion.   Neurological:      Mental Status: He is alert.             Assessment and Plan    Diagnoses and all orders for this visit:    1. Left lower quadrant abdominal tenderness without rebound tenderness (Primary)  Assessment & Plan:  Based on Juan's symptoms of nausea, vomiting and constipation coupled with his significant left lower quadrant tenderness to palpation, my suspicion is high for diverticulitis.  I am going to send him for belly labs and then proceed with CTAP with contrast and oral prep.  Work note given for today and tomorrow.    Orders:  -     Comprehensive Metabolic Panel; Future  -     CBC & Differential; Future  -     Urinalysis With Culture If Indicated -; Future  -     Urinalysis With Microscopic - Urine, Clean Catch; Future  -     Lipase; Future  -     H. Pylori Breath Test - Breath, Lung; Future    2. Constipation, unspecified constipation type  Assessment & Plan:  Prescription sent for MiraLAX.    Orders:  -     polyethylene glycol (GlycoLax) 17 GM/SCOOP powder; Take 17 g by mouth Daily As Needed (constipation).  Dispense: 527 g; Refill: 1        Follow Up   Return if symptoms worsen or fail to improve.  Patient was given instructions and counseling regarding his condition or for health maintenance advice. Please see specific information pulled into the AVS if appropriate.

## 2021-07-09 ENCOUNTER — TELEPHONE (OUTPATIENT)
Dept: FAMILY MEDICINE CLINIC | Age: 41
End: 2021-07-09

## 2021-07-09 DIAGNOSIS — R10.814 LEFT LOWER QUADRANT ABDOMINAL TENDERNESS WITHOUT REBOUND TENDERNESS: Primary | ICD-10-CM

## 2021-07-09 NOTE — TELEPHONE ENCOUNTER
Caller: MAXIMO SARAVIA    Relationship: Emergency Contact    Best call back number: [phone]    Caller requesting test results: WIFE    What test was performed: BLOOD WORK    When was the test performed: 7/8/21    Where was the test performed: [facility]    Additional notes: PATIENT'S WIFE ST[facility] AND WOULD LIKE TO DISCUSS THEM WHEN AVAILABLE.

## 2021-07-10 PROCEDURE — 99283 EMERGENCY DEPT VISIT LOW MDM: CPT

## 2021-07-10 PROCEDURE — 36415 COLL VENOUS BLD VENIPUNCTURE: CPT

## 2021-07-10 PROCEDURE — 96374 THER/PROPH/DIAG INJ IV PUSH: CPT

## 2021-07-11 ENCOUNTER — HOSPITAL ENCOUNTER (EMERGENCY)
Facility: HOSPITAL | Age: 41
Discharge: HOME | End: 2021-07-11
Attending: EMERGENCY MEDICINE | Admitting: EMERGENCY MEDICINE

## 2021-07-11 ENCOUNTER — APPOINTMENT (OUTPATIENT)
Dept: CT IMAGING | Facility: HOSPITAL | Age: 41
End: 2021-07-11

## 2021-07-11 VITALS
TEMPERATURE: 98.1 F | SYSTOLIC BLOOD PRESSURE: 122 MMHG | WEIGHT: 240 LBS | RESPIRATION RATE: 18 BRPM | HEART RATE: 90 BPM | OXYGEN SATURATION: 97 % | HEIGHT: 73 IN | DIASTOLIC BLOOD PRESSURE: 87 MMHG | BODY MASS INDEX: 31.81 KG/M2

## 2021-07-11 DIAGNOSIS — R16.1 SPLENOMEGALY: ICD-10-CM

## 2021-07-11 DIAGNOSIS — R10.9 ABDOMINAL PAIN, UNSPECIFIED ABDOMINAL LOCATION: Primary | ICD-10-CM

## 2021-07-11 LAB
ALBUMIN SERPL-MCNC: 4.7 G/DL (ref 3.5–5.2)
ALBUMIN/GLOB SERPL: 1.8 G/DL
ALP SERPL-CCNC: 58 U/L (ref 39–117)
ALT SERPL W P-5'-P-CCNC: 51 U/L (ref 1–41)
ANION GAP SERPL CALCULATED.3IONS-SCNC: 11.1 MMOL/L (ref 5–15)
AST SERPL-CCNC: 31 U/L (ref 1–40)
BASOPHILS # BLD AUTO: 0.06 10*3/MM3 (ref 0–0.2)
BASOPHILS NFR BLD AUTO: 0.8 % (ref 0–1.5)
BILIRUB SERPL-MCNC: 1 MG/DL (ref 0–1.2)
BILIRUB UR QL STRIP: NEGATIVE
BUN SERPL-MCNC: 13 MG/DL (ref 6–20)
BUN/CREAT SERPL: 13 (ref 7–25)
CALCIUM SPEC-SCNC: 9.7 MG/DL (ref 8.6–10.5)
CHLORIDE SERPL-SCNC: 101 MMOL/L (ref 98–107)
CLARITY UR: CLEAR
CO2 SERPL-SCNC: 24.9 MMOL/L (ref 22–29)
COLOR UR: YELLOW
CREAT SERPL-MCNC: 1 MG/DL (ref 0.76–1.27)
DEPRECATED RDW RBC AUTO: 40.5 FL (ref 37–54)
EOSINOPHIL # BLD AUTO: 0.08 10*3/MM3 (ref 0–0.4)
EOSINOPHIL NFR BLD AUTO: 1.1 % (ref 0.3–6.2)
ERYTHROCYTE [DISTWIDTH] IN BLOOD BY AUTOMATED COUNT: 12.4 % (ref 12.3–15.4)
GFR SERPL CREATININE-BSD FRML MDRD: 83 ML/MIN/1.73
GLOBULIN UR ELPH-MCNC: 2.6 GM/DL
GLUCOSE SERPL-MCNC: 93 MG/DL (ref 65–99)
GLUCOSE UR STRIP-MCNC: NEGATIVE MG/DL
HCT VFR BLD AUTO: 43.6 % (ref 37.5–51)
HGB BLD-MCNC: 15.4 G/DL (ref 13–17.7)
HGB UR QL STRIP.AUTO: NEGATIVE
HOLD SPECIMEN: NORMAL
HOLD SPECIMEN: NORMAL
IMM GRANULOCYTES # BLD AUTO: 0.03 10*3/MM3 (ref 0–0.05)
IMM GRANULOCYTES NFR BLD AUTO: 0.4 % (ref 0–0.5)
KETONES UR QL STRIP: NEGATIVE
LEUKOCYTE ESTERASE UR QL STRIP.AUTO: NEGATIVE
LIPASE SERPL-CCNC: 42 U/L (ref 13–60)
LYMPHOCYTES # BLD AUTO: 1.86 10*3/MM3 (ref 0.7–3.1)
LYMPHOCYTES NFR BLD AUTO: 25.9 % (ref 19.6–45.3)
MCH RBC QN AUTO: 31.6 PG (ref 26.6–33)
MCHC RBC AUTO-ENTMCNC: 35.3 G/DL (ref 31.5–35.7)
MCV RBC AUTO: 89.3 FL (ref 79–97)
MONOCYTES # BLD AUTO: 0.62 10*3/MM3 (ref 0.1–0.9)
MONOCYTES NFR BLD AUTO: 8.6 % (ref 5–12)
NEUTROPHILS NFR BLD AUTO: 4.52 10*3/MM3 (ref 1.7–7)
NEUTROPHILS NFR BLD AUTO: 63.2 % (ref 42.7–76)
NITRITE UR QL STRIP: NEGATIVE
NRBC BLD AUTO-RTO: 0 /100 WBC (ref 0–0.2)
PH UR STRIP.AUTO: 7 [PH] (ref 5–8)
PLATELET # BLD AUTO: 297 10*3/MM3 (ref 140–450)
PMV BLD AUTO: 10 FL (ref 6–12)
POTASSIUM SERPL-SCNC: 3.4 MMOL/L (ref 3.5–5.2)
PROT SERPL-MCNC: 7.3 G/DL (ref 6–8.5)
PROT UR QL STRIP: NEGATIVE
RBC # BLD AUTO: 4.88 10*6/MM3 (ref 4.14–5.8)
SODIUM SERPL-SCNC: 137 MMOL/L (ref 136–145)
SP GR UR STRIP: 1.01 (ref 1–1.03)
UROBILINOGEN UR QL STRIP: NORMAL
WBC # BLD AUTO: 7.17 10*3/MM3 (ref 3.4–10.8)
WHOLE BLOOD HOLD SPECIMEN: NORMAL

## 2021-07-11 PROCEDURE — 85025 COMPLETE CBC W/AUTO DIFF WBC: CPT

## 2021-07-11 PROCEDURE — 81003 URINALYSIS AUTO W/O SCOPE: CPT

## 2021-07-11 PROCEDURE — 80053 COMPREHEN METABOLIC PANEL: CPT

## 2021-07-11 PROCEDURE — 25010000002 ONDANSETRON PER 1 MG: Performed by: PHYSICIAN ASSISTANT

## 2021-07-11 PROCEDURE — 25010000002 IOPAMIDOL 61 % SOLUTION: Performed by: EMERGENCY MEDICINE

## 2021-07-11 PROCEDURE — 36415 COLL VENOUS BLD VENIPUNCTURE: CPT

## 2021-07-11 PROCEDURE — 74177 CT ABD & PELVIS W/CONTRAST: CPT

## 2021-07-11 PROCEDURE — 96374 THER/PROPH/DIAG INJ IV PUSH: CPT

## 2021-07-11 PROCEDURE — 83690 ASSAY OF LIPASE: CPT

## 2021-07-11 RX ORDER — ONDANSETRON 2 MG/ML
4 INJECTION INTRAMUSCULAR; INTRAVENOUS ONCE
Status: COMPLETED | OUTPATIENT
Start: 2021-07-11 | End: 2021-07-11

## 2021-07-11 RX ORDER — SODIUM CHLORIDE 0.9 % (FLUSH) 0.9 %
10 SYRINGE (ML) INJECTION AS NEEDED
Status: DISCONTINUED | OUTPATIENT
Start: 2021-07-11 | End: 2021-07-11 | Stop reason: HOSPADM

## 2021-07-11 RX ORDER — ONDANSETRON 4 MG/1
4 TABLET, ORALLY DISINTEGRATING ORAL EVERY 8 HOURS PRN
Qty: 12 TABLET | Refills: 0 | Status: SHIPPED | OUTPATIENT
Start: 2021-07-11 | End: 2022-01-17

## 2021-07-11 RX ORDER — PANTOPRAZOLE SODIUM 40 MG/1
40 TABLET, DELAYED RELEASE ORAL DAILY
Qty: 30 TABLET | Refills: 0 | Status: SHIPPED | OUTPATIENT
Start: 2021-07-11 | End: 2022-01-17

## 2021-07-11 RX ADMIN — ONDANSETRON 4 MG: 2 INJECTION INTRAMUSCULAR; INTRAVENOUS at 02:01

## 2021-07-11 RX ADMIN — SODIUM CHLORIDE, POTASSIUM CHLORIDE, SODIUM LACTATE AND CALCIUM CHLORIDE 1000 ML: 600; 310; 30; 20 INJECTION, SOLUTION INTRAVENOUS at 01:59

## 2021-07-11 RX ADMIN — IOPAMIDOL 85 ML: 612 INJECTION, SOLUTION INTRAVENOUS at 01:47

## 2021-07-11 NOTE — ED NOTES
Pt reports LLQ abdominal pain that started around 7/5/21. Pt reports intermittent vomiting since that point. His PCP suspects diverticulitis and pt describes the pain as all over the abdomen. Pt also repots 15lb weight loss since last week. Pt does not have appendix           Leatha Reyes, RN  07/11/21 0013

## 2021-07-11 NOTE — ED PROVIDER NOTES
MD ATTESTATION NOTE  Patient was placed in face mask in first look and the following protective measures were taken unless additional measures were taken and documented below in the ED course. Patient was wearing facemask when I entered the room and throughout our encounter. I wore full protective equipment throughout this patient encounter including a face mask, and gloves. Hand hygiene was performed before donning protective equipment and after removal when leaving the room.    The PAUL and I have discussed this patient's history, physical exam, and treatment plan. I have reviewed the documentation and personally had a face to face interaction with the patient. I affirm the PAUL documentation and agree with their diagnostics, findings, treatment, plan, and disposition.  The attached note describes my personal findings.    Juan Mohan is a 41 y.o. male who presents to the ED c/o abdominal pain.  Patient reports abdominal pain began on the seventh.  Patient complains of epigastric pain that radiates to the lower portions of his abdomen, coming and going, denies any inciting events.  Patient reports pain is worsened with certain movements, can be improved with certain positions, improved with standing up.  Patient also reports pain is worsened with eating or drinking.  Patient reports nausea vomiting, 2 times emesis today, emesis nonbloody nonbilious.  Patient reports last bowel movement 2 days prior.  No diarrhea.  Patient denies any dysuria, no increase in urinary frequency or urgency.  No fever shakes chills or night sweats.    On exam:  General: NAD  Head: NCAT  ENT: Extraocular motion intact, pupils equal and round reactive to light, moist mucous membranes  Neck: Supple, trachea midline.  Cardiac: regular rate and rhythm  Lungs: Clear to auscultation bilaterally  Abdomen: Soft, upper abdominal tenderness, greatest in the epigastrium, negative Tejada's, negative McBurney's, no rebound  tenderness/guarding/rigidity  : Deferred to PAUL  Extremities: Moves all extremities well, no peripheral edema  Skin: Warm, dry.    Medical Decision Making:  After the initial H&P, I discussed pertinent information from history and physical exam with patient/family.  Discussed differential diagnosis.  Discussed plan for ED evaluation/work-up/treatment.  All questions answered.  Patient/family is agreeable with plan.    ED Course as of Jul 11 0514   Sun Jul 11, 2021   0005 My differential diagnosis for abdominal pain includes but is not limited to:  Gastritis, gastroenteritis, peptic ulcer disease, GERD, esophageal perforation, acute appendicitis, mesenteric adenitis, Meckel's diverticulum, epiploic appendagitis, diverticulitis, colon cancer, ulcerative colitis, Crohn's disease, intussusception, small bowel obstruction, adhesions, ischemic bowel, perforated viscus, ileus, obstipation, biliary colic, cholecystitis, cholelithiasis, [place], hepatitis, pancreatitis, common bile duct obstruction, cholangitis, bile leak, splenic trauma, splenic rupture, splenic infarction, splenic abscess, abdominal abscess, ascites, spontaneous bacterial peritonitis, hernia, UTI, cystitis, prostatitis, ureterolithiasis, urinary obstruction, ovarian cyst, torsion, pregnancy, ectopic pregnancy, PID, pelvic abscess, mittelschmerz, endometriosis, AAA, myocardial infarction, pneumonia, cancer, porphyria, DKA, medications, sickle cell, viral syndrome, zoster        [JG]      ED Course User Index  [JG] Arthur Clarke MD       Diagnosis  Final diagnoses:   Abdominal pain, unspecified abdominal location   Splenomegaly        Arthur Clarke MD  07/11/21 0514

## 2021-07-11 NOTE — DISCHARGE INSTRUCTIONS
Home, rest, keep well-hydrated, advance bland diet as tolerated, medicine as directed, home medicine as prescribed, follow up with PCP and GI specialist for recheck and further evaluation. Return to care with further concerns.

## 2021-07-11 NOTE — ED PROVIDER NOTES
" EMERGENCY DEPARTMENT ENCOUNTER    Room Number:  04/04  Date of encounter:  7/11/2021  PCP: Josy Vinson APRN  Historian: Patient      HPI:  Chief Complaint: abdominal pain       Context: Juna Mohan is a 41 y.o. male with past medical history of hypertension who presents to the ED c/o abdominal pain.  Patient states that he is been having nausea vomiting and abdominal pain that he describes as \"aggravating\" for the past week.  Patient states the pain has been intermittent and has been associated with 15 pound weight loss and belching.  Denies any heartburn, diarrhea or constipation, no fever, no UTI symptoms.  Patient states that the pain is worse with movement, nothing makes it better.      PAST MEDICAL HISTORY  Active Ambulatory Problems     Diagnosis Date Noted   • Precordial pain 02/27/2021   • Abnormal EKG 02/27/2021   • Essential hypertension 02/27/2021   • Dyslipidemia 02/27/2021   • Left lower quadrant abdominal tenderness without rebound tenderness 07/08/2021   • Constipation 07/08/2021     Resolved Ambulatory Problems     Diagnosis Date Noted   • No Resolved Ambulatory Problems     Past Medical History:   Diagnosis Date   • Chest wall pain    • Hypertension          PAST SURGICAL HISTORY  Past Surgical History:   Procedure Laterality Date   • APPENDECTOMY     • CLOSED REDUCTION Left     left arm         FAMILY HISTORY  Family History   Problem Relation Age of Onset   • Hypertension Mother    • Stroke Father    • Hypertension Brother    • Heart attack Paternal Grandmother    • Heart disease Paternal Grandmother    • Diabetes Paternal Grandmother    • Cancer Paternal Grandfather         liver         SOCIAL HISTORY  Social History     Socioeconomic History   • Marital status:      Spouse name: Not on file   • Number of children: Not on file   • Years of education: Not on file   • Highest education level: Not on file   Tobacco Use   • Smoking status: Former Smoker     Types: Cigarettes     " Quit date: 1/3/2020     Years since quittin.5   • Smokeless tobacco: Never Used   Substance and Sexual Activity   • Alcohol use: Not Currently     Comment: Daily caffeine use   • Drug use: Never   • Sexual activity: Defer         ALLERGIES  Codeine, Keflex [cephalexin], and Penicillins        REVIEW OF SYSTEMS  Review of Systems     All systems reviewed and negative except for those discussed in HPI.       PHYSICAL EXAM    I have reviewed the triage vital signs and nursing notes.    ED Triage Vitals [07/10/21 2340]   Temp Heart Rate Resp BP SpO2   98.1 °F (36.7 °C) 90 18 (!) 156/103 97 %      Temp src Heart Rate Source Patient Position BP Location FiO2 (%)   -- -- -- -- --       Physical Exam  GENERAL: Alert and oriented x3, not distressed  HENT: nares patent  EYES: no scleral icterus  CV: regular rhythm, regular rate, no murmurs, rubs, or gallops  RESPIRATORY: normal effort, clear to auscultate bilaterally  ABDOMEN: soft/nontender, no rebound or guarding, normal bowel sounds  MUSCULOSKELETAL: no deformity  NEURO: alert, moves all extremities, follows commands  SKIN: warm, dry no rashes        LAB RESULTS  Recent Results (from the past 24 hour(s))   Comprehensive Metabolic Panel    Collection Time: 21 12:35 AM    Specimen: Blood   Result Value Ref Range    Glucose 93 65 - 99 mg/dL    BUN 13 6 - 20 mg/dL    Creatinine 1.00 0.76 - 1.27 mg/dL    Sodium 137 136 - 145 mmol/L    Potassium 3.4 (L) 3.5 - 5.2 mmol/L    Chloride 101 98 - 107 mmol/L    CO2 24.9 22.0 - 29.0 mmol/L    Calcium 9.7 8.6 - 10.5 mg/dL    Total Protein 7.3 6.0 - 8.5 g/dL    Albumin 4.70 3.50 - 5.20 g/dL    ALT (SGPT) 51 (H) 1 - 41 U/L    AST (SGOT) 31 1 - 40 U/L    Alkaline Phosphatase 58 39 - 117 U/L    Total Bilirubin 1.0 0.0 - 1.2 mg/dL    eGFR Non African Amer 83 >60 mL/min/1.73    Globulin 2.6 gm/dL    A/G Ratio 1.8 g/dL    BUN/Creatinine Ratio 13.0 7.0 - 25.0    Anion Gap 11.1 5.0 - 15.0 mmol/L   Lipase    Collection Time: 21  12:35 AM    Specimen: Blood   Result Value Ref Range    Lipase 42 13 - 60 U/L   Green Top (Gel)    Collection Time: 07/11/21 12:35 AM   Result Value Ref Range    Extra Tube Hold for add-ons.    Lavender Top    Collection Time: 07/11/21 12:35 AM   Result Value Ref Range    Extra Tube hold for add-on    Gold Top - SST    Collection Time: 07/11/21 12:35 AM   Result Value Ref Range    Extra Tube Hold for add-ons.    CBC Auto Differential    Collection Time: 07/11/21 12:35 AM    Specimen: Blood   Result Value Ref Range    WBC 7.17 3.40 - 10.80 10*3/mm3    RBC 4.88 4.14 - 5.80 10*6/mm3    Hemoglobin 15.4 13.0 - 17.7 g/dL    Hematocrit 43.6 37.5 - 51.0 %    MCV 89.3 79.0 - 97.0 fL    MCH 31.6 26.6 - 33.0 pg    MCHC 35.3 31.5 - 35.7 g/dL    RDW 12.4 12.3 - 15.4 %    RDW-SD 40.5 37.0 - 54.0 fl    MPV 10.0 6.0 - 12.0 fL    Platelets 297 140 - 450 10*3/mm3    Neutrophil % 63.2 42.7 - 76.0 %    Lymphocyte % 25.9 19.6 - 45.3 %    Monocyte % 8.6 5.0 - 12.0 %    Eosinophil % 1.1 0.3 - 6.2 %    Basophil % 0.8 0.0 - 1.5 %    Immature Grans % 0.4 0.0 - 0.5 %    Neutrophils, Absolute 4.52 1.70 - 7.00 10*3/mm3    Lymphocytes, Absolute 1.86 0.70 - 3.10 10*3/mm3    Monocytes, Absolute 0.62 0.10 - 0.90 10*3/mm3    Eosinophils, Absolute 0.08 0.00 - 0.40 10*3/mm3    Basophils, Absolute 0.06 0.00 - 0.20 10*3/mm3    Immature Grans, Absolute 0.03 0.00 - 0.05 10*3/mm3    nRBC 0.0 0.0 - 0.2 /100 WBC   Urinalysis With Microscopic If Indicated (No Culture) - Urine, Clean Catch    Collection Time: 07/11/21 12:36 AM    Specimen: Urine, Clean Catch   Result Value Ref Range    Color, UA Yellow Yellow, Straw    Appearance, UA Clear Clear    pH, UA 7.0 5.0 - 8.0    Specific Gravity, UA 1.011 1.005 - 1.030    Glucose, UA Negative Negative    Ketones, UA Negative Negative    Bilirubin, UA Negative Negative    Blood, UA Negative Negative    Protein, UA Negative Negative    Leuk Esterase, UA Negative Negative    Nitrite, UA Negative Negative     Urobilinogen, UA 0.2 E.U./dL 0.2 - 1.0 E.U./dL       Ordered the above labs and independently reviewed the results.        RADIOLOGY  CT Abdomen Pelvis With Contrast    Result Date: 7/11/2021  CT OF THE ABDOMEN AND PELVIS WITH CONTRAST  HISTORY: Left lower quadrant abdominal pain  COMPARISON: None available.  TECHNIQUE: Axial CT imaging was obtained through the abdomen and pelvis. IV contrast administered.  FINDINGS: Images through the lung bases are clear. The stomach and duodenum are unremarkable, as are the adrenal glands. Spleen is enlarged, measuring up to 15.2 cm in AP dimensions. No focal hepatic lesions are seen. Gallbladder appears normal, as is the pancreas. The kidneys enhance symmetrically. There is no hydronephrosis. Multiple low-attenuation lesions are seen on the left kidney. These are favored to represent cysts. The single largest measures up to 2.2 cm. Some do measure slightly higher density than simple cysts, and they could be further assessed with renal protocol CT or MRI. No distal ureteral or bladder stones are seen. There are dystrophic calcifications of the prostate gland. There is colonic diverticulosis. There is no diverticulitis. The appendix is normal. No acute osseous abnormalities are seen. There is a fat-containing right inguinal hernia.      Splenomegaly.  Radiation dose reduction techniques were utilized, including automated exposure control and exposure modulation based on body size.  This report was finalized on 7/11/2021 2:04 AM by Dr. Hailee Quigley M.D.        I ordered the above noted radiological studies. Reviewed by me and discussed with radiologist.  See dictation for official radiology interpretation.      PROCEDURES    Procedures      MEDICATIONS GIVEN IN ER    Medications   sodium chloride 0.9 % flush 10 mL (has no administration in time range)   lactated ringers bolus 1,000 mL (0 mL Intravenous Stopped 7/11/21 3321)   ondansetron (ZOFRAN) injection 4 mg (4 mg  Intravenous Given 7/11/21 0201)   iopamidol (ISOVUE-300) 61 % injection 100 mL (85 mL Intravenous Given by Other 7/11/21 0147)         PROGRESS, DATA ANALYSIS, CONSULTS, AND MEDICAL DECISION MAKING    All labs have been independently reviewed by me.  All radiology studies have been reviewed by me and discussed with radiologist dictating the report.   EKG's independently viewed and interpreted by me.  Discussion below represents my analysis of pertinent findings related to patient's condition, differential diagnosis, treatment plan and final disposition.    DDx: Includes but not limited to diverticulitis, appendicitis, UTI, irritable bowel syndrome, IBD    ED Course as of Jul 11 0358   Sun Jul 11, 2021   0005 My differential diagnosis for abdominal pain includes but is not limited to:  Gastritis, gastroenteritis, peptic ulcer disease, GERD, esophageal perforation, acute appendicitis, mesenteric adenitis, Meckel's diverticulum, epiploic appendagitis, diverticulitis, colon cancer, ulcerative colitis, Crohn's disease, intussusception, small bowel obstruction, adhesions, ischemic bowel, perforated viscus, ileus, obstipation, biliary colic, cholecystitis, cholelithiasis, [place], hepatitis, pancreatitis, common bile duct obstruction, cholangitis, bile leak, splenic trauma, splenic rupture, splenic infarction, splenic abscess, abdominal abscess, ascites, spontaneous bacterial peritonitis, hernia, UTI, cystitis, prostatitis, ureterolithiasis, urinary obstruction, ovarian cyst, torsion, pregnancy, ectopic pregnancy, PID, pelvic abscess, mittelschmerz, endometriosis, AAA, myocardial infarction, pneumonia, cancer, porphyria, DKA, medications, sickle cell, viral syndrome, zoster        [JG]      ED Course User Index  [JG] Arthur Clarke MD       MDM: Patient's laboratory evaluation and CT scan show no acute process in the abdomen or pelvis, there is no surgical emergency.  The patient does have a large spleen, and  he has been notified of these findings and the need for follow-up with his PCP and GI for further evaluation, patient expressed understanding.  Patient's vital signs are stable, patient is safe for discharge home.    PPE: Both the patient and I wore a surgical mask throughout the entire patient encounter. I wore protective goggles.     AS OF 03:58 EDT VITALS:    BP - 122/87  HR - 90  TEMP - 98.1 °F (36.7 °C)  O2 SATS - 97%        DIAGNOSIS  Final diagnoses:   Abdominal pain, unspecified abdominal location   Splenomegaly         DISPOSITION  DISCHARGE    Patient discharged in stable condition.    Reviewed implications of results, diagnosis, meds, responsibility to follow up, warning signs and symptoms of possible worsening, potential complications and reasons to return to ER.    Patient/Family voiced understanding of above instructions.    Discussed plan for discharge, as there is no emergent indication for admission. Patient referred to primary care provider for BP management due to today's BP. Pt/family is agreeable and understands need for follow up and repeat testing.  Pt is aware that discharge does not mean that nothing is wrong but it indicates no emergency is present that requires admission and they must continue care with follow-up as given below or physician of their choice.     FOLLOW-UP  Josy Vinson APRN  [address] [facility] [address]  [facility] [address] [phone]    Schedule an appointment as soon as possible for a visit       Joseph Steele MD  [address] [facility] [address]  [facility] [id number] [phone]    Schedule an appointment as soon as possible for a visit            Medication List      New Prescriptions    ondansetron ODT 4 MG disintegrating tablet  Commonly known as: Zofran ODT  Place 1 tablet on the tongue Every 8 (Eight) Hours As Needed for Nausea.     pantoprazole 40 MG EC tablet  Commonly known as: PROTONIX  Take 1 tablet by mouth Daily.           Where to Get Your  Medications      These medications were sent to [facility] [address] [place] [address] - [address] AT [facility] [address] [place] - [phone]  - [phone]   [address] [facility], [facility] [address]    Phone: [phone]   · ondansetron ODT 4 MG disintegrating tablet  · pantoprazole 40 MG EC tablet            Abelino Aguirre, PA  07/11/21 0359

## 2021-07-15 ENCOUNTER — OFFICE VISIT (OUTPATIENT)
Dept: FAMILY MEDICINE CLINIC | Age: 41
End: 2021-07-15

## 2021-07-15 VITALS
HEART RATE: 69 BPM | SYSTOLIC BLOOD PRESSURE: 138 MMHG | BODY MASS INDEX: 32.07 KG/M2 | DIASTOLIC BLOOD PRESSURE: 92 MMHG | HEIGHT: 73 IN | WEIGHT: 242 LBS | TEMPERATURE: 97.4 F

## 2021-07-15 DIAGNOSIS — R16.1 SPLEEN ENLARGEMENT: ICD-10-CM

## 2021-07-15 DIAGNOSIS — N42.9 PROSTATE IRREGULARITY: Primary | ICD-10-CM

## 2021-07-15 DIAGNOSIS — Q61.02 MULTIPLE RENAL CYSTS: ICD-10-CM

## 2021-07-15 PROCEDURE — 99214 OFFICE O/P EST MOD 30 MIN: CPT | Performed by: NURSE PRACTITIONER

## 2021-07-15 NOTE — PROGRESS NOTES
"Chief Complaint  Pain (hit head on brick wall this morning, discuss labs )    Subjective Juan is a 41-year-old male that states he was told to follow-up here to discuss lab work that was done at [facility] emergency room on July 11.  He had been having some vomiting and some stomach pain in the days prior to that and when his symptoms worsened he went to the ER.  At that time blood work was done showed a complete blood count with differential within normal limits lipase was normal blood sugar normal, kidney function normal.  His ALT was slightly elevated at 51 and his potassium was slightly low at 3.4.  He has not had any vomiting since July 10.  He did have a CT of the abdomen and pelvis with contrast and it showed some mild splenomegaly and some dystrophic calcifications of the prostate gland.  He denies any history of prostatitis or prostate concerns.  Diverticulosis without diverticulitis was noted and there are some left kidney cyst for which the radiologist recommended some updated imaging such as a renal protocol CT.  He states he is feeling much better and he does point out that he hit his head in the locker room at work today and he denies any type of abrasions or contusions, has not noticed any blurred vision, lethargy, nausea, vomiting, or any other concerns related to this today.  A small right inguinal hernia was also noted on the CT scan.       Juan Mohan presents to [facility]    Review of Systems   Constitutional: Negative.    HENT: Negative.    Respiratory: Negative.    Cardiovascular: Negative.    Gastrointestinal: Negative.    Genitourinary: Negative.    Musculoskeletal: Negative.    Neurological: Negative.    Psychiatric/Behavioral: Negative.         Objective   Vital Signs:   Vitals:    07/15/21 1402   BP: 138/92   BP Location: Left arm   Patient Position: Sitting   Pulse: 69   Temp: 97.4 °F (36.3 °C)   Weight: 110 kg (242 lb)   Height: 185.4 cm (72.99\")    "   Physical Exam  Vitals reviewed.   Constitutional:       General: He is not in acute distress.     Appearance: Normal appearance. He is well-developed.   Eyes:      Extraocular Movements: Extraocular movements intact.      Pupils: Pupils are equal, round, and reactive to light.   Cardiovascular:      Rate and Rhythm: Normal rate and regular rhythm.      Heart sounds: Normal heart sounds.   Pulmonary:      Effort: Pulmonary effort is normal.      Breath sounds: Normal breath sounds.   Musculoskeletal:      Right lower leg: No edema.      Left lower leg: No edema.   Skin:     General: Skin is warm and dry.      Comments: Normal skin exam   Neurological:      General: No focal deficit present.      Mental Status: He is alert and oriented to person, place, and time. Mental status is at baseline.      Cranial Nerves: No cranial nerve deficit.      Motor: No weakness.      Coordination: Coordination normal.      Gait: Gait normal.   Psychiatric:         Attention and Perception: Attention normal.         Mood and Affect: Mood and affect normal.         Behavior: Behavior normal.          Result Review :              Assessment and Plan    Diagnoses and all orders for this visit:    1. Prostate irregularity (Primary)    2. Spleen enlargement    3. Multiple renal cysts        Follow Up    No follow-ups on file.  Patient was given instructions and counseling regarding his condition or for health maintenance advice. Please see specific information pulled into the AVS if appropriate.

## 2021-07-16 ENCOUNTER — TELEPHONE (OUTPATIENT)
Dept: UROLOGY | Facility: CLINIC | Age: 41
End: 2021-07-16

## 2021-07-26 ENCOUNTER — OFFICE VISIT (OUTPATIENT)
Dept: UROLOGY | Facility: CLINIC | Age: 41
End: 2021-07-26

## 2021-07-26 VITALS — BODY MASS INDEX: 32.78 KG/M2 | HEIGHT: 72 IN | WEIGHT: 242 LBS | HEART RATE: 88 BPM

## 2021-07-26 DIAGNOSIS — N28.1 RENAL CYST: ICD-10-CM

## 2021-07-26 DIAGNOSIS — N40.0 BENIGN PROSTATIC HYPERPLASIA WITHOUT LOWER URINARY TRACT SYMPTOMS: Primary | ICD-10-CM

## 2021-07-26 LAB
BILIRUB BLD-MCNC: NEGATIVE MG/DL
CLARITY, POC: CLEAR
COLOR UR: YELLOW
GLUCOSE UR STRIP-MCNC: NEGATIVE MG/DL
KETONES UR QL: NEGATIVE
LEUKOCYTE EST, POC: NEGATIVE
NITRITE UR-MCNC: NEGATIVE MG/ML
PH UR: 5.5 [PH] (ref 5–8)
PROT UR STRIP-MCNC: NEGATIVE MG/DL
RBC # UR STRIP: NEGATIVE /UL
SP GR UR: 1.03 (ref 1–1.03)
UROBILINOGEN UR QL: NORMAL

## 2021-07-26 PROCEDURE — 81003 URINALYSIS AUTO W/O SCOPE: CPT | Performed by: NURSE PRACTITIONER

## 2021-07-26 PROCEDURE — 99213 OFFICE O/P EST LOW 20 MIN: CPT | Performed by: NURSE PRACTITIONER

## 2021-07-26 RX ORDER — DOXYCYCLINE HYCLATE 100 MG/1
100 CAPSULE ORAL 2 TIMES DAILY
Qty: 56 CAPSULE | Refills: 0 | Status: SHIPPED | OUTPATIENT
Start: 2021-07-26 | End: 2021-08-23

## 2021-07-26 RX ORDER — LACTOBACILLUS ACIDOPHILUS 20B CELL
1 CAPSULE ORAL DAILY
Qty: 28 CAPSULE | Refills: 0 | Status: SHIPPED | OUTPATIENT
Start: 2021-07-26 | End: 2022-01-17

## 2021-07-26 NOTE — PROGRESS NOTES
Chief Complaint: Benign Prostatic Hypertrophy    Subjective         History of Present Illness  Juan Mohan is a 41 y.o. male presents to [facility] to be seen for prostate findings on CT scan.    Patient was seen in the emergency department at [facility] 7/11/2021 with abdominal pain.  The patient had a CT scan of the abdomen pelvis with IV contrast performed at this visit which revealed multiple low-attenuation lesions on the left kidney these are favored to represent cysts.  Single largest measures up to 2.2 cm some do measure slightly higher density than simple cysts and they could be further assessed with a renal protocol CT or MRI.  The patient was also found to have dystrophic calcifications of the prostate gland.    He states that he has had some pain in her perineal area off and on for some time.    He reports no  malignancy within his family.    He is a former smoker quit 18 months ago prior to that smoked half a pack a day for approximately 10 years.        Objective     Past Medical History:   Diagnosis Date   • Chest wall pain    • Hypertension        Past Surgical History:   Procedure Laterality Date   • APPENDECTOMY     • CLOSED REDUCTION Left     left arm         Current Outpatient Medications:   •  amLODIPine (NORVASC) 5 MG tablet, Take 5 mg by mouth Daily., Disp: , Rfl:   •  cetirizine (zyrTEC) 10 MG tablet, Take 10 mg by mouth Daily., Disp: , Rfl:   •  cholecalciferol (VITAMIN D3) 25 MCG (1000 UT) tablet, Take 1,000 Units by mouth Daily., Disp: , Rfl:   •  fluticasone (FLONASE) 50 MCG/ACT nasal spray, 1 spray into the nostril(s) as directed by provider Daily., Disp: , Rfl:   •  hydroCHLOROthiazide (HYDRODIURIL) 25 MG tablet, Take 25 mg by mouth Daily., Disp: , Rfl:   •  ondansetron ODT (Zofran ODT) 4 MG disintegrating tablet, Place 1 tablet on the tongue Every 8 (Eight) Hours As Needed for Nausea., Disp: 12 tablet, Rfl: 0  •  pantoprazole (PROTONIX) 40 MG EC  "tablet, Take 1 tablet by mouth Daily., Disp: 30 tablet, Rfl: 0  •  polyethylene glycol (GlycoLax) 17 GM/SCOOP powder, Take 17 g by mouth Daily As Needed (constipation)., Disp: 527 g, Rfl: 1  •  vitamin C (ASCORBIC ACID) 250 MG tablet, Take 250 mg by mouth Daily., Disp: , Rfl:   •  Zinc Sulfate (ZINC 15 PO), Take  by mouth Daily., Disp: , Rfl:   •  amLODIPine (NORVASC) 5 MG tablet, Take 1 tablet by mouth Daily., Disp: 180 tablet, Rfl: 0  •  doxycycline (VIBRAMYCIN) 100 MG capsule, Take 1 capsule by mouth 2 (Two) Times a Day for 28 days., Disp: 56 capsule, Rfl: 0  •  Lactobacillus (Florajen Acidophilus) capsule, Take 1 capsule by mouth Daily., Disp: 28 capsule, Rfl: 0    Allergies   Allergen Reactions   • Codeine Anaphylaxis   • Keflex [Cephalexin] Anaphylaxis   • Penicillins Anaphylaxis        Family History   Problem Relation Age of Onset   • Hypertension Mother    • Stroke Father    • Hypertension Brother    • Heart attack Paternal Grandmother    • Heart disease Paternal Grandmother    • Diabetes Paternal Grandmother    • Cancer Paternal Grandfather         liver       Social History     Socioeconomic History   • Marital status:      Spouse name: Not on file   • Number of children: Not on file   • Years of education: Not on file   • Highest education level: Not on file   Tobacco Use   • Smoking status: Former Smoker     Packs/day: 0.50     Years: 10.00     Pack years: 5.00     Types: Cigarettes     Quit date: 1/3/2020     Years since quittin.5   • Smokeless tobacco: Never Used   Vaping Use   • Vaping Use: Never used   Substance and Sexual Activity   • Alcohol use: Not Currently     Comment: Daily caffeine use   • Drug use: Never   • Sexual activity: Defer       Vital Signs:   Pulse 88   Ht 182.9 cm (72\")   Wt 110 kg (242 lb)   BMI 32.82 kg/m²      Physical Exam     Result Review :   The following data was reviewed by: ANAI Castañeda on 2021:  Results for orders placed or performed in " visit on 07/26/21   POC Urinalysis Dipstick, Automated    Specimen: Urine   Result Value Ref Range    Color Yellow Yellow, Straw, Dark Yellow, Lynn    Clarity, UA Clear Clear    Specific Gravity  1.030 1.005 - 1.030    pH, Urine 5.5 5.0 - 8.0    Leukocytes Negative Negative    Nitrite, UA Negative Negative    Protein, POC Negative Negative mg/dL    Glucose, UA Negative Negative, 1000 mg/dL (3+) mg/dL    Ketones, UA Negative Negative    Urobilinogen, UA Normal Normal    Bilirubin Negative Negative    Blood, UA Negative Negative       CT OF THE ABDOMEN AND PELVIS WITH CONTRAST     HISTORY: Left lower quadrant abdominal pain     COMPARISON: None available.     TECHNIQUE: Axial CT imaging was obtained through the abdomen and pelvis.  IV contrast administered.     FINDINGS:  Images through the lung bases are clear. The stomach and duodenum are  unremarkable, as are the adrenal glands. Spleen is enlarged, measuring  up to 15.2 cm in AP dimensions. No focal hepatic lesions are seen.  Gallbladder appears normal, as is the pancreas. The kidneys enhance  symmetrically. There is no hydronephrosis. Multiple low-attenuation  lesions are seen on the left kidney. These are favored to represent  cysts. The single largest measures up to 2.2 cm. Some do measure  slightly higher density than simple cysts, and they could be further  assessed with renal protocol CT or MRI. No distal ureteral or bladder  stones are seen. There are dystrophic calcifications of the prostate  gland. There is colonic diverticulosis. There is no diverticulitis. The  appendix is normal. No acute osseous abnormalities are seen. There is a  fat-containing right inguinal hernia.     IMPRESSION:  Splenomegaly.     Radiation dose reduction techniques were utilized, including automated  exposure control and exposure modulation based on body size.     This report was finalized on 7/11/2021 2:04 AM by Dr. Hailee Quigley M.D.    Procedures        Assessment and  Plan    Diagnoses and all orders for this visit:    1. Benign prostatic hyperplasia without lower urinary tract symptoms (Primary)  -     POC Urinalysis Dipstick, Automated  -     doxycycline (VIBRAMYCIN) 100 MG capsule; Take 1 capsule by mouth 2 (Two) Times a Day for 28 days.  Dispense: 56 capsule; Refill: 0  -     Lactobacillus (Florajen Acidophilus) capsule; Take 1 capsule by mouth Daily.  Dispense: 28 capsule; Refill: 0    2. Renal cyst  -     CT Abdomen Pelvis With & Without Contrast; Future      Discussed with patient that given his symptoms and findings on CT scan we can treat him for prostatitis to ensure full resolution of any inflammation within his prostate gland.  Discussed with the patient that the renal cysts found on his CT scan will need to be followed up with in about 3 months with a repeat CT scan renal protocol.    I spent 15 minutes caring for Juan on this date of service. This time includes time spent by me in the following activities:reviewing tests, obtaining and/or reviewing a separately obtained history, performing a medically appropriate examination and/or evaluation , counseling and educating the patient/family/caregiver, ordering medications, tests, or procedures, and documenting information in the medical record  Follow Up   Return in about 3 months (around 10/26/2021) for f/u prostate calcifications and CT .  Patient was given instructions and counseling regarding his condition or for health maintenance advice. Please see specific information pulled into the AVS if appropriate.

## 2021-08-25 ENCOUNTER — OFFICE VISIT (OUTPATIENT)
Dept: CARDIOLOGY | Facility: CLINIC | Age: 41
End: 2021-08-25

## 2021-08-25 VITALS — DIASTOLIC BLOOD PRESSURE: 80 MMHG | WEIGHT: 248.8 LBS | SYSTOLIC BLOOD PRESSURE: 140 MMHG | BODY MASS INDEX: 33.74 KG/M2

## 2021-08-25 DIAGNOSIS — I10 ESSENTIAL HYPERTENSION: Primary | ICD-10-CM

## 2021-08-25 DIAGNOSIS — R94.31 ABNORMAL EKG: ICD-10-CM

## 2021-08-25 PROCEDURE — 99214 OFFICE O/P EST MOD 30 MIN: CPT | Performed by: NURSE PRACTITIONER

## 2021-08-25 PROCEDURE — 93000 ELECTROCARDIOGRAM COMPLETE: CPT | Performed by: NURSE PRACTITIONER

## 2021-08-25 RX ORDER — AMLODIPINE BESYLATE 5 MG/1
5 TABLET ORAL DAILY
COMMUNITY
End: 2021-11-01

## 2021-08-25 RX ORDER — FLUTICASONE PROPIONATE 50 MCG
1 SPRAY, SUSPENSION (ML) NASAL DAILY
COMMUNITY
Start: 2021-07-05 | End: 2022-01-17 | Stop reason: SDUPTHER

## 2021-08-25 NOTE — PROGRESS NOTES
Date of Office Visit: 2021  Encounter Provider: Sonja Shankar, TYLER, APRN  Place of Service: [facility]  Patient Name: Juan Mohan  :1980        Subjective:     Chief Complaint:  Hypertension, history of chest discomfort      History of Present Illness:  Juan Mohan is a 41 y.o. male patient of Dr. Ambrose.  This patient is new to me and I reviewed his records.    Patient has a history of hypertension, abnormal EKG, elevated ALT, dyslipidemia, history of COVID-19 infection 10/2020.    Patient was seen in the ER 2021 for 3 days of intermittent left-sided chest discomfort radiating to left arm which had some positional exacerbation.  He had EKG with nonspecific changes and blood work was unremarkable.  Blood pressure was quite high at 153/101.  Chest x-ray showed prior granulomatous disease but otherwise unremarkable.  Troponin was negative.  It was felt to be noncardiac.  He continued to have some episodes of left chest and arm discomfort lasting 5 minutes but milder.  He had been under increased stress after brother-in-law  of COVID-19.  He himself had COVID-19 infection 10/2020 and had lost his taste and smell and had sinus congestion and symptoms had resolved.  He saw Dr. mAbrose for further evaluation 2021.  He had stress echo showed normal LV systolic function with EF of 56%, normal global longitudinal LV strain of -19.6%, normal left atrial volume with negative saline study, mild calcification of the aortic valve without regurgitation or stenosis, trace mitral regurgitation, trace tricuspid regurgitation with normal RVSP.  Pericardium looked normal.  There was no evidence of ischemia.  PACs occurred rarely.      Patient presents to office today for follow-up appointment.  Patient reports he is doing well since last visit.  He has been doing rowing machine about 2 times a week for about 25 minutes.  He denies any issues or concerns with this.   Denies any exertional chest pain or discomfort, shortness of breath, shortness of breath with exertion, palpitations, racing heartbeat sensation, lower extremity edema, dizziness, syncope, near syncope, falls, fatigue, or abnormal bleeding.  He does rarely get a 1 to 2-second twinge of discomfort to chest which is random at rest and not exertional or anginal in nature.  Blood pressure checked sporadically at home fluctuates somewhat.          Past Medical History:   Diagnosis Date   • Chest wall pain    • Cyst of prostate    • Diverticulosis    • Hernia, inguinal     right   • Hypertension      Past Surgical History:   Procedure Laterality Date   • APPENDECTOMY     • CLOSED REDUCTION Left     left arm     Outpatient Medications Prior to Visit   Medication Sig Dispense Refill   • amLODIPine (NORVASC) 5 MG tablet Take 5 mg by mouth Daily.     • cholecalciferol (VITAMIN D3) 25 MCG (1000 UT) tablet Take 1,000 Units by mouth Daily.     • fluticasone (FLONASE) 50 MCG/ACT nasal spray 1 spray into the nostril(s) as directed by provider Daily.     • hydroCHLOROthiazide (HYDRODIURIL) 25 MG tablet Take 25 mg by mouth Daily.     • Lactobacillus (Florajen Acidophilus) capsule Take 1 capsule by mouth Daily. 28 capsule 0   • ondansetron ODT (Zofran ODT) 4 MG disintegrating tablet Place 1 tablet on the tongue Every 8 (Eight) Hours As Needed for Nausea. 12 tablet 0   • pantoprazole (PROTONIX) 40 MG EC tablet Take 1 tablet by mouth Daily. 30 tablet 0   • polyethylene glycol (GlycoLax) 17 GM/SCOOP powder Take 17 g by mouth Daily As Needed (constipation). 527 g 1   • vitamin C (ASCORBIC ACID) 250 MG tablet Take 250 mg by mouth Daily.     • Zinc Sulfate (ZINC 15 PO) Take  by mouth Daily.     • cetirizine (zyrTEC) 10 MG tablet Take 10 mg by mouth Daily.     • fluticasone (FLONASE) 50 MCG/ACT nasal spray 1 spray into the nostril(s) as directed by provider Daily.     • amLODIPine (NORVASC) 5 MG tablet Take 5 mg by mouth Daily.     •  amLODIPine (NORVASC) 5 MG tablet Take 1 tablet by mouth Daily. 180 tablet 0     No facility-administered medications prior to visit.       Allergies as of 2021 - Reviewed 2021   Allergen Reaction Noted   • Codeine Anaphylaxis 10/17/2020   • Keflex [cephalexin] Anaphylaxis 10/17/2020   • Penicillins Anaphylaxis 10/17/2020     Social History     Socioeconomic History   • Marital status:      Spouse name: Not on file   • Number of children: Not on file   • Years of education: Not on file   • Highest education level: Not on file   Tobacco Use   • Smoking status: Former Smoker     Packs/day: 0.50     Years: 10.00     Pack years: 5.00     Types: Cigarettes     Quit date: 1/3/2020     Years since quittin.6   • Smokeless tobacco: Never Used   Vaping Use   • Vaping Use: Never used   Substance and Sexual Activity   • Alcohol use: Not Currently     Comment: Daily caffeine use   • Drug use: Never   • Sexual activity: Defer     Family History   Problem Relation Age of Onset   • Hypertension Mother    • Stroke Father    • Hypertension Brother    • Heart attack Paternal Grandmother    • Heart disease Paternal Grandmother    • Diabetes Paternal Grandmother    • Cancer Paternal Grandfather         liver       Review of Systems   Constitutional: Negative for malaise/fatigue.   Cardiovascular:        SEE HPI   Respiratory: Negative for shortness of breath.    Hematologic/Lymphatic: Negative for bleeding problem.   Musculoskeletal: Negative for falls.   Gastrointestinal: Negative for melena.   Genitourinary: Negative for hematuria.   Neurological: Negative for dizziness.   Psychiatric/Behavioral: Negative for altered mental status.          Objective:     Vitals:    21 0955   BP: 140/80   Weight: 113 kg (248 lb 12.8 oz)     Body mass index is 33.74 kg/m².      PHYSICAL EXAM:  Constitutional:       General: Not in acute distress.     Appearance: Well-developed. Not diaphoretic.   Eyes:      Pupils: Pupils  are equal, round, and reactive to light.   HENT:      Head: Normocephalic and atraumatic.   Neck:      Thyroid: No thyromegaly.      Vascular: No JVD.      Trachea: No tracheal deviation.   Pulmonary:      Effort: Pulmonary effort is normal. No respiratory distress.      Breath sounds: Normal breath sounds. No wheezing. No rales.   Cardiovascular:      Normal rate. Regular rhythm.      Murmurs: There is no murmur.      No gallop. No click. No rub.   Edema:     Peripheral edema absent.   Abdominal:      General: Bowel sounds are normal. There is no distension.      Palpations: Abdomen is soft.      Tenderness: There is no abdominal tenderness. There is no guarding or rebound.   Musculoskeletal: Normal range of motion.         General: No tenderness or deformity.      Cervical back: Neck supple. Skin:     General: Skin is warm and dry.      Findings: No erythema or rash.   Neurological:      Mental Status: Alert and oriented to person, place, and time.   Psychiatric:         Behavior: Behavior normal.         Judgment: Judgment normal.             ECG 12 Lead    Date/Time: 8/25/2021 10:39 AM  Performed by: Sonja Shankar DNP, APRN  Authorized by: Sonja Shankar DNP, ANAI   Comparison: compared with previous ECG   Rhythm: sinus rhythm  Rate: normal  BPM: 63  T inversion: III  Other findings: non-specific ST-T wave changes  Comments: No significant changes from previous EKG              Assessment:       Diagnosis Plan   1. Essential hypertension  ECG 12 Lead   2. Abnormal EKG  ECG 12 Lead         Plan:     1. Hypertension: Blood pressure a little high in the office today.  I asked her to check it at home daily for the next week and call with some readings.  Blood pressure goal less than 130/80 on average.  He is also going to increase his exercise levels and cut back on salt and will call if he has any issues with increasing exercise.  Also has an appointment with PCP in a week or 2 and will bring blood  pressure log with him to that appointment.  2. Dyslipidemia: PCP managing  3. History of elevated ALT: PCP managing  4. History of COVID-19 infection 10/2020: Has received first Moderna vaccine and to get the second 19/3/21.  5. History of chest discomfort: With normal stress echo 2/2021 without evidence of ischemia.  He will get an atypical rare 1 second twinge of discomfort randomly at rest which does not sound cardiac in nature, likely musculoskeletal.    At this point he is feeling well from a heart standpoint and doing well with exercise routine.  Patient to follow-up with Dr. Ambrose in 1 year or sooner if needed for any new, recurrent, or worsening symptoms or other issues or concerns.  Discussed in detail signs/symptoms that warrant sooner call or follow-up to the office or ER visit.        Records reviewed including but not limited to 1/2021 EKG, 2/2021 stress echo, 7/2021 CBC and CMP.           Your medication list          Accurate as of August 25, 2021 10:42 AM. If you have any questions, ask your nurse or doctor.            CONTINUE taking these medications      Instructions Last Dose Given Next Dose Due   amLODIPine 5 MG tablet  Commonly known as: NORVASC      Take 5 mg by mouth Daily.       cetirizine 10 MG tablet  Commonly known as: zyrTEC      Take 10 mg by mouth Daily.       cholecalciferol 25 MCG (1000 UT) tablet  Commonly known as: VITAMIN D3      Take 1,000 Units by mouth Daily.       Florajen Acidophilus capsule      Take 1 capsule by mouth Daily.       fluticasone 50 MCG/ACT nasal spray  Commonly known as: FLONASE      1 spray into the nostril(s) as directed by provider Daily.       fluticasone 50 MCG/ACT nasal spray  Commonly known as: FLONASE      1 spray into the nostril(s) as directed by provider Daily.       hydroCHLOROthiazide 25 MG tablet  Commonly known as: HYDRODIURIL      Take 25 mg by mouth Daily.       ondansetron ODT 4 MG disintegrating tablet  Commonly known as: Zofran ODT      Place  1 tablet on the tongue Every 8 (Eight) Hours As Needed for Nausea.       pantoprazole 40 MG EC tablet  Commonly known as: PROTONIX      Take 1 tablet by mouth Daily.       polyethylene glycol 17 GM/SCOOP powder  Commonly known as: GlycoLax      Take 17 g by mouth Daily As Needed (constipation).       vitamin C 250 MG tablet  Commonly known as: ASCORBIC ACID      Take 250 mg by mouth Daily.       ZINC 15 PO      Take  by mouth Daily.              The above medication changes may not have been made by this provider.  Patient's medication list was updated to reflect medications they are currently taking including medication changes made by other providers.            Thanks,    Sonja Shankar, DNP, APRN  08/25/2021         Dictated utilizing Dragon dictation

## 2021-10-25 ENCOUNTER — APPOINTMENT (OUTPATIENT)
Dept: CT IMAGING | Facility: HOSPITAL | Age: 41
End: 2021-10-25

## 2021-11-01 RX ORDER — AMLODIPINE BESYLATE 5 MG/1
TABLET ORAL
Qty: 30 TABLET | Refills: 0 | Status: SHIPPED | OUTPATIENT
Start: 2021-11-01 | End: 2021-12-27

## 2021-12-27 RX ORDER — HYDROCHLOROTHIAZIDE 25 MG/1
TABLET ORAL
Qty: 90 TABLET | Refills: 0 | Status: SHIPPED | OUTPATIENT
Start: 2021-12-27 | End: 2022-01-17 | Stop reason: SDUPTHER

## 2021-12-27 RX ORDER — AMLODIPINE BESYLATE 5 MG/1
TABLET ORAL
Qty: 30 TABLET | Refills: 0 | Status: SHIPPED | OUTPATIENT
Start: 2021-12-27 | End: 2022-01-17 | Stop reason: DRUGHIGH

## 2021-12-28 DIAGNOSIS — N40.0 BENIGN PROSTATIC HYPERPLASIA WITHOUT LOWER URINARY TRACT SYMPTOMS: ICD-10-CM

## 2021-12-30 RX ORDER — DOXYCYCLINE HYCLATE 100 MG/1
CAPSULE ORAL
Qty: 56 CAPSULE | Refills: 0 | OUTPATIENT
Start: 2021-12-30

## 2022-01-03 ENCOUNTER — HOSPITAL ENCOUNTER (OUTPATIENT)
Dept: CT IMAGING | Facility: HOSPITAL | Age: 42
Discharge: HOME | End: 2022-01-03
Admitting: NURSE PRACTITIONER

## 2022-01-03 DIAGNOSIS — N28.1 RENAL CYST: ICD-10-CM

## 2022-01-03 LAB — CREAT BLDA-MCNC: 0.9 MG/DL

## 2022-01-03 PROCEDURE — 82565 ASSAY OF CREATININE: CPT

## 2022-01-03 PROCEDURE — 74178 CT ABD&PLV WO CNTR FLWD CNTR: CPT

## 2022-01-03 PROCEDURE — 0 IOPAMIDOL PER 1 ML: Performed by: NURSE PRACTITIONER

## 2022-01-03 RX ADMIN — IOPAMIDOL 100 ML: 755 INJECTION, SOLUTION INTRAVENOUS at 09:32

## 2022-01-17 ENCOUNTER — LAB (OUTPATIENT)
Dept: LAB | Facility: HOSPITAL | Age: 42
End: 2022-01-17

## 2022-01-17 ENCOUNTER — OFFICE VISIT (OUTPATIENT)
Dept: UROLOGY | Facility: CLINIC | Age: 42
End: 2022-01-17

## 2022-01-17 ENCOUNTER — OFFICE VISIT (OUTPATIENT)
Dept: FAMILY MEDICINE CLINIC | Age: 42
End: 2022-01-17

## 2022-01-17 VITALS
BODY MASS INDEX: 34.23 KG/M2 | DIASTOLIC BLOOD PRESSURE: 88 MMHG | SYSTOLIC BLOOD PRESSURE: 142 MMHG | HEART RATE: 68 BPM | WEIGHT: 252.4 LBS | TEMPERATURE: 98.2 F

## 2022-01-17 VITALS — BODY MASS INDEX: 34.7 KG/M2 | RESPIRATION RATE: 16 BRPM | HEIGHT: 72 IN | WEIGHT: 256.2 LBS

## 2022-01-17 DIAGNOSIS — N50.811 TESTICULAR PAIN, RIGHT: Primary | ICD-10-CM

## 2022-01-17 DIAGNOSIS — E78.5 DYSLIPIDEMIA: Chronic | ICD-10-CM

## 2022-01-17 DIAGNOSIS — N28.1 RENAL CYST: ICD-10-CM

## 2022-01-17 DIAGNOSIS — I10 ESSENTIAL HYPERTENSION: Primary | Chronic | ICD-10-CM

## 2022-01-17 DIAGNOSIS — K40.90 NON-RECURRENT UNILATERAL INGUINAL HERNIA WITHOUT OBSTRUCTION OR GANGRENE: ICD-10-CM

## 2022-01-17 DIAGNOSIS — Z13.6 SCREENING FOR CARDIOVASCULAR CONDITION: ICD-10-CM

## 2022-01-17 LAB
ALBUMIN SERPL-MCNC: 4.9 G/DL (ref 3.5–5.2)
ALBUMIN/GLOB SERPL: 2.6 G/DL
ALP SERPL-CCNC: 86 U/L (ref 39–117)
ALT SERPL W P-5'-P-CCNC: 100 U/L (ref 1–41)
ANION GAP SERPL CALCULATED.3IONS-SCNC: 9.4 MMOL/L (ref 5–15)
AST SERPL-CCNC: 31 U/L (ref 1–40)
BASOPHILS # BLD AUTO: 0.03 10*3/MM3 (ref 0–0.2)
BASOPHILS NFR BLD AUTO: 0.5 % (ref 0–1.5)
BILIRUB SERPL-MCNC: 0.5 MG/DL (ref 0–1.2)
BUN SERPL-MCNC: 11 MG/DL (ref 6–20)
BUN/CREAT SERPL: 11.6 (ref 7–25)
CALCIUM SPEC-SCNC: 9.9 MG/DL (ref 8.6–10.5)
CHLORIDE SERPL-SCNC: 103 MMOL/L (ref 98–107)
CHOLEST SERPL-MCNC: 236 MG/DL (ref 0–200)
CO2 SERPL-SCNC: 27.6 MMOL/L (ref 22–29)
CREAT SERPL-MCNC: 0.95 MG/DL (ref 0.76–1.27)
DEPRECATED RDW RBC AUTO: 40.1 FL (ref 37–54)
EOSINOPHIL # BLD AUTO: 0.11 10*3/MM3 (ref 0–0.4)
EOSINOPHIL NFR BLD AUTO: 1.9 % (ref 0.3–6.2)
ERYTHROCYTE [DISTWIDTH] IN BLOOD BY AUTOMATED COUNT: 12 % (ref 12.3–15.4)
GFR SERPL CREATININE-BSD FRML MDRD: 88 ML/MIN/1.73
GLOBULIN UR ELPH-MCNC: 1.9 GM/DL
GLUCOSE SERPL-MCNC: 96 MG/DL (ref 65–99)
HCT VFR BLD AUTO: 44.4 % (ref 37.5–51)
HDLC SERPL-MCNC: 39 MG/DL (ref 40–60)
HGB BLD-MCNC: 15.7 G/DL (ref 13–17.7)
IMM GRANULOCYTES # BLD AUTO: 0.02 10*3/MM3 (ref 0–0.05)
IMM GRANULOCYTES NFR BLD AUTO: 0.3 % (ref 0–0.5)
LDLC SERPL CALC-MCNC: 149 MG/DL (ref 0–100)
LDLC/HDLC SERPL: 3.72 {RATIO}
LYMPHOCYTES # BLD AUTO: 1.46 10*3/MM3 (ref 0.7–3.1)
LYMPHOCYTES NFR BLD AUTO: 25.5 % (ref 19.6–45.3)
MCH RBC QN AUTO: 32 PG (ref 26.6–33)
MCHC RBC AUTO-ENTMCNC: 35.4 G/DL (ref 31.5–35.7)
MCV RBC AUTO: 90.6 FL (ref 79–97)
MONOCYTES # BLD AUTO: 0.66 10*3/MM3 (ref 0.1–0.9)
MONOCYTES NFR BLD AUTO: 11.5 % (ref 5–12)
NEUTROPHILS NFR BLD AUTO: 3.44 10*3/MM3 (ref 1.7–7)
NEUTROPHILS NFR BLD AUTO: 60.3 % (ref 42.7–76)
PLATELET # BLD AUTO: 238 10*3/MM3 (ref 140–450)
PMV BLD AUTO: 10.8 FL (ref 6–12)
POTASSIUM SERPL-SCNC: 4.4 MMOL/L (ref 3.5–5.2)
PROT SERPL-MCNC: 6.8 G/DL (ref 6–8.5)
RBC # BLD AUTO: 4.9 10*6/MM3 (ref 4.14–5.8)
SODIUM SERPL-SCNC: 140 MMOL/L (ref 136–145)
TRIGL SERPL-MCNC: 259 MG/DL (ref 0–150)
VLDLC SERPL-MCNC: 48 MG/DL (ref 5–40)
WBC NRBC COR # BLD: 5.72 10*3/MM3 (ref 3.4–10.8)

## 2022-01-17 PROCEDURE — 99213 OFFICE O/P EST LOW 20 MIN: CPT | Performed by: NURSE PRACTITIONER

## 2022-01-17 PROCEDURE — 99214 OFFICE O/P EST MOD 30 MIN: CPT | Performed by: NURSE PRACTITIONER

## 2022-01-17 PROCEDURE — 80061 LIPID PANEL: CPT

## 2022-01-17 PROCEDURE — 80053 COMPREHEN METABOLIC PANEL: CPT

## 2022-01-17 PROCEDURE — 85025 COMPLETE CBC W/AUTO DIFF WBC: CPT

## 2022-01-17 PROCEDURE — 36415 COLL VENOUS BLD VENIPUNCTURE: CPT

## 2022-01-17 RX ORDER — HYDROCHLOROTHIAZIDE 25 MG/1
25 TABLET ORAL DAILY
Qty: 90 TABLET | Refills: 1 | Status: SHIPPED | OUTPATIENT
Start: 2022-01-17 | End: 2022-08-22 | Stop reason: SDUPTHER

## 2022-01-17 RX ORDER — AMLODIPINE BESYLATE 5 MG/1
5 TABLET ORAL DAILY
Qty: 30 TABLET | Refills: 0 | Status: CANCELLED | OUTPATIENT
Start: 2022-01-17

## 2022-01-17 RX ORDER — AMLODIPINE BESYLATE 5 MG/1
7.5 TABLET ORAL DAILY
Qty: 90 TABLET | Refills: 1 | Status: SHIPPED | OUTPATIENT
Start: 2022-01-17 | End: 2022-08-04

## 2022-01-17 NOTE — PROGRESS NOTES
"Chief Complaint: Follow-up (ct)    Subjective         History of Present Illness  Juan Mohan is a 42 y.o. male presents to [facility] to be seen for f/u on CT scan.    Patient was last seen in 7/2021 after an ED visit for abdominal pain. He had a CT scan which revealed several renal cysts but needed further imaging to determine the eitiology.     Patient had CT scan w/wo performed on 1/3/2022.    He today states that he has been having some right sided testicular pain and sometimes feels as if his testicles \"go up inside\" of him.      Objective     Past Medical History:   Diagnosis Date   • Chest wall pain    • Cyst of prostate    • Diverticulosis    • Hernia, inguinal     right   • Hypertension        Past Surgical History:   Procedure Laterality Date   • APPENDECTOMY     • CLOSED REDUCTION Left     left arm         Current Outpatient Medications:   •  amLODIPine (NORVASC) 5 MG tablet, Take 1.5 tablets by mouth Daily., Disp: 90 tablet, Rfl: 1  •  cholecalciferol (VITAMIN D3) 25 MCG (1000 UT) tablet, Take 1,000 Units by mouth Daily., Disp: , Rfl:   •  fluticasone (FLONASE) 50 MCG/ACT nasal spray, 1 spray into the nostril(s) as directed by provider Daily., Disp: , Rfl:   •  hydroCHLOROthiazide (HYDRODIURIL) 25 MG tablet, Take 1 tablet by mouth Daily., Disp: 90 tablet, Rfl: 1  •  vitamin C (ASCORBIC ACID) 250 MG tablet, Take 250 mg by mouth Daily., Disp: , Rfl:   •  Zinc Sulfate (ZINC 15 PO), Take  by mouth Daily., Disp: , Rfl:     Allergies   Allergen Reactions   • Codeine Anaphylaxis   • Keflex [Cephalexin] Anaphylaxis   • Penicillins Anaphylaxis        Family History   Problem Relation Age of Onset   • Hypertension Mother    • Stroke Father    • Hypertension Brother    • Heart attack Paternal Grandmother    • Heart disease Paternal Grandmother    • Diabetes Paternal Grandmother    • Cancer Paternal Grandfather         liver       Social History     Socioeconomic History   • Marital " "status:    Tobacco Use   • Smoking status: Former Smoker     Packs/day: 0.50     Years: 10.00     Pack years: 5.00     Types: Cigarettes     Quit date: 1/3/2020     Years since quittin.0   • Smokeless tobacco: Never Used   Vaping Use   • Vaping Use: Never used   Substance and Sexual Activity   • Alcohol use: Not Currently     Comment: Daily caffeine use   • Drug use: Never   • Sexual activity: Defer       Vital Signs:   Resp 16   Ht 182.9 cm (72\")   Wt 116 kg (256 lb 3.2 oz)   BMI 34.75 kg/m²      Physical Exam  Genitourinary:     Comments: ttp right inguinal canal         Result Review :   The following data was reviewed by: ANAI Castañeda on 2022:  Results for orders placed or performed during the hospital encounter of 22   POC Creatinine    Specimen: Blood   Result Value Ref Range    Creatinine 0.90 mg/dL    GFR MDRD       GFR MDRD Non  94 mL/min/1.73 sq.M        PROCEDURE:  CT ABDOMEN PELVIS W WO CONTRAST     COMPARISON:  None  INDICATIONS:  renal cyst     TECHNIQUE:    After obtaining the patient's consent, CT images of the abdomen were created without and   with non-ionic intravenous contrast material, and CT images of the pelvis were obtained with   non-ionic intravenous contrast material.       PROTOCOL:     Urology imaging protocol performed                 RADIATION:      DLP: 2762.8 mGy*cm               Automated exposure control was utilized to minimize radiation dose.   CONTRAST:      100 cc Isovue 370 I.V.  LABS:   eGFR: 69.7 ml/min/1.73m2     FINDINGS:          Tiny 3 millimeter nodular density right lower lobe of doubtful significance.  Noncontrast CT   demonstrates no renal stone.  No comparison exams are available.  There is no ureteral stone.  Fat   containing right inguinal hernia.  No liver lesion is evident.  Pancreas appears within normal   limits.  Adrenal glands are normal.  No enhancing ureteral lesion.  No enhancing bladder " lesion.    Oral contrast not used.  There is diverticulosis.  Incomplete distention of the sigmoid colon.    Moderate stool throughout the colon.  Normal appendix.  No small bowel finding is evident.  Normal   aorta.  No adenopathy.     In the past upper pole of the left kidney there is a 2.6 centimeter lesion measuring 6 Hounsfield   units on the precontrast and 9.4 centimeters on the postcontrast compatible with cyst.  In the mid   left kidney, there is a 1.6 centimeter lesion that demonstrates 26 Hounsfield units on the   precontrast and 31 Hounsfield units on the postcontrast likely a slightly complex hemorrhagic or   proteinaceous cyst.  There is a very small 6 millimeter lesion in the lower pole the left kidney   measuring about 36 Hounsfield units on the postcontrast and 29 on the precontrast  there is also   very small lesion measuring 0 point 6 centimeters in the medial mid to lower aspect of the left   kidney measuring There is no acute osseous findings.  There may be a bone island in the left iliac   bone.5.     CONCLUSION:   1. 2.6 centimeter simple cyst upper pole left kidney.  2. 1.6 centimeter left mid pole lesion without significant enhancement likely a hemorrhagic or   proteinaceous cyst.  3. Very small 0.6 centimeter lesion lower pole left kidney too small to characterize likely cyst.    There is also a small 0.6 centimeter lesion medial aspect mid to lower left kidney likely a small   cyst.  Consider follow-up is warranted clinically.  No comparison exams.  4. No other acute finding.  Other findings as above.  There is diverticulosis.  Incomplete   distention of the colon.  Moderate stool throughout the colon.            ROSANNE NIETO MD         Electronically Signed and Approved By: ROSANNE NIETO MD on 1/03/2022 at 14:09       Procedures        Assessment and Plan    Diagnoses and all orders for this visit:    1. Testicular pain, right (Primary)  -     US Scrotum & Testicles; Future    2.  Renal cyst  -     CT Abdomen Pelvis With & Without Contrast; Future    in regards to renal cyst we will f/u imaging in 1 year. We will have patient get testicular scrotal u/s to evaluate pain and f/u in 4 weeks.       I spent 10  minutes caring for Juan on this date of service. This time includes time spent by me in the following activities:reviewing tests, obtaining and/or reviewing a separately obtained history, performing a medically appropriate examination and/or evaluation , counseling and educating the patient/family/caregiver, ordering medications, tests, or procedures, and documenting information in the medical record  Follow Up   Return in about 4 weeks (around 2/14/2022) for f/u u/s .  Patient was given instructions and counseling regarding his condition or for health maintenance advice. Please see specific information pulled into the AVS if appropriate.         This document has been electronically signed by ANAI Castañeda  January 17, 2022 10:11 EST

## 2022-01-17 NOTE — ASSESSMENT & PLAN NOTE
Will repeat labs today, consider medication if continues to remain elevated given BP elevation as well

## 2022-01-17 NOTE — PROGRESS NOTES
"Chief Complaint  Juan Mohan presents to [facility] for Hypertension (needs refills)    Subjective          Juan presents for follow up on hypertension.  Compliance with medication is reported as good   Check of BP at home reported as \"not as high as it is here today\".  He does report that he is slightly stressed.  He did get a new job and feels that he may be more stressed than usual.  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          Review of Systems      Allergies   Allergen Reactions   • Codeine Anaphylaxis   • Keflex [Cephalexin] Anaphylaxis   • Penicillins Anaphylaxis      Past Medical History:   Diagnosis Date   • Chest wall pain    • Cyst of prostate    • Diverticulosis    • Hernia, inguinal     right   • Hypertension      Current Outpatient Medications   Medication Sig Dispense Refill   • cholecalciferol (VITAMIN D3) 25 MCG (1000 UT) tablet Take 1,000 Units by mouth Daily.     • fluticasone (FLONASE) 50 MCG/ACT nasal spray 1 spray into the nostril(s) as directed by provider Daily.     • hydroCHLOROthiazide (HYDRODIURIL) 25 MG tablet Take 1 tablet by mouth Daily. 90 tablet 1   • vitamin C (ASCORBIC ACID) 250 MG tablet Take 250 mg by mouth Daily.     • Zinc Sulfate (ZINC 15 PO) Take  by mouth Daily.     • amLODIPine (NORVASC) 5 MG tablet Take 1.5 tablets by mouth Daily. 90 tablet 1     No current facility-administered medications for this visit.     Past Surgical History:   Procedure Laterality Date   • APPENDECTOMY     • CLOSED REDUCTION Left     left arm      Social History     Tobacco Use   • Smoking status: Former Smoker     Packs/day: 0.50     Years: 10.00     Pack years: 5.00     Types: Cigarettes     Quit date: 1/3/2020     Years since quittin.0   • Smokeless tobacco: Never Used   Vaping Use   • Vaping Use: Never used   Substance Use Topics   • Alcohol use: Not Currently     Comment: Daily caffeine use   • Drug use: " Never     Family History   Problem Relation Age of Onset   • Hypertension Mother    • Stroke Father    • Hypertension Brother    • Heart attack Paternal Grandmother    • Heart disease Paternal Grandmother    • Diabetes Paternal Grandmother    • Cancer Paternal Grandfather         liver     Health Maintenance Due   Topic Date Due   • ANNUAL PHYSICAL  Never done   • TDAP/TD VACCINES (1 - Tdap) Never done   • HEPATITIS C SCREENING  Never done      Immunization History   Administered Date(s) Administered   • COVID-19 (MODERNA) 1st, 2nd, 3rd Dose Only 08/12/2021, 09/22/2021   • Flu Vaccine Quad PF >18YRS 09/08/2016   • Flu Vaccine Quad PF >36MO 09/08/2016        Objective     Vitals:    01/17/22 0858 01/17/22 0905   BP: (!) 142/101 142/88   Pulse: 68    Temp: 98.2 °F (36.8 °C)    Weight: 114 kg (252 lb 6.4 oz)      Body mass index is 34.23 kg/m².     Physical Exam  Vitals reviewed.   Constitutional:       Appearance: Normal appearance.   HENT:      Head: Normocephalic.   Eyes:      Pupils: Pupils are equal, round, and reactive to light.   Cardiovascular:      Rate and Rhythm: Normal rate and regular rhythm.      Heart sounds: No murmur heard.      Pulmonary:      Effort: Pulmonary effort is normal.      Breath sounds: Normal breath sounds.   Musculoskeletal:         General: Normal range of motion.   Neurological:      Mental Status: He is alert.   Psychiatric:         Mood and Affect: Mood normal.         Behavior: Behavior normal.           Result Review :                               Assessment and Plan      Diagnoses and all orders for this visit:    1. Essential hypertension (Primary)  Comments:  Will increase his amlodipine to 7.5 mg daily follow-up in 4 weeks sodium reduction caffeine reduction encouraged  Assessment & Plan:  Hypertension is unchanged.  Continue current treatment regimen.  Blood pressure will be reassessed in 4 weeks.    Orders:  -     hydroCHLOROthiazide (HYDRODIURIL) 25 MG tablet; Take 1 tablet  by mouth Daily.  Dispense: 90 tablet; Refill: 1  -     amLODIPine (NORVASC) 5 MG tablet; Take 1.5 tablets by mouth Daily.  Dispense: 90 tablet; Refill: 1    2. Dyslipidemia  Comments:  He is not fasting today however he lives in [place] would like to get his labs done today.  Assessment & Plan:  Will repeat labs today, consider medication if continues to remain elevated given BP elevation as well       3. Screening for cardiovascular condition  -     Comprehensive Metabolic Panel; Future  -     CBC & Differential; Future  -     Lipid Panel; Future            Follow Up     Return in about 4 weeks (around 2/14/2022).

## 2022-02-08 ENCOUNTER — HOSPITAL ENCOUNTER (OUTPATIENT)
Dept: ULTRASOUND IMAGING | Facility: HOSPITAL | Age: 42
Discharge: HOME | End: 2022-02-08
Admitting: NURSE PRACTITIONER

## 2022-02-08 DIAGNOSIS — N50.811 TESTICULAR PAIN, RIGHT: ICD-10-CM

## 2022-02-08 PROCEDURE — 76870 US EXAM SCROTUM: CPT

## 2022-02-15 ENCOUNTER — APPOINTMENT (OUTPATIENT)
Dept: ULTRASOUND IMAGING | Facility: HOSPITAL | Age: 42
End: 2022-02-15

## 2022-02-17 ENCOUNTER — OFFICE VISIT (OUTPATIENT)
Dept: SURGERY | Facility: CLINIC | Age: 42
End: 2022-02-17

## 2022-02-17 ENCOUNTER — PREP FOR SURGERY (OUTPATIENT)
Dept: OTHER | Facility: HOSPITAL | Age: 42
End: 2022-02-17

## 2022-02-17 VITALS — HEIGHT: 72 IN | BODY MASS INDEX: 33.32 KG/M2 | WEIGHT: 246 LBS | RESPIRATION RATE: 16 BRPM

## 2022-02-17 DIAGNOSIS — K40.90 RIGHT INGUINAL HERNIA: Primary | ICD-10-CM

## 2022-02-17 PROCEDURE — 99203 OFFICE O/P NEW LOW 30 MIN: CPT | Performed by: SURGERY

## 2022-02-17 RX ORDER — SODIUM CHLORIDE 0.9 % (FLUSH) 0.9 %
10 SYRINGE (ML) INJECTION AS NEEDED
Status: CANCELLED | OUTPATIENT
Start: 2022-02-17

## 2022-02-17 RX ORDER — SODIUM CHLORIDE 0.9 % (FLUSH) 0.9 %
10 SYRINGE (ML) INJECTION EVERY 12 HOURS SCHEDULED
Status: CANCELLED | OUTPATIENT
Start: 2022-02-17

## 2022-02-17 RX ORDER — SODIUM CHLORIDE, SODIUM LACTATE, POTASSIUM CHLORIDE, CALCIUM CHLORIDE 600; 310; 30; 20 MG/100ML; MG/100ML; MG/100ML; MG/100ML
100 INJECTION, SOLUTION INTRAVENOUS CONTINUOUS
Status: CANCELLED | OUTPATIENT
Start: 2022-02-17

## 2022-02-17 RX ORDER — CLINDAMYCIN PHOSPHATE 900 MG/50ML
900 INJECTION INTRAVENOUS ONCE
Status: CANCELLED | OUTPATIENT
Start: 2022-02-17 | End: 2022-02-17

## 2022-02-17 NOTE — PROGRESS NOTES
"Chief Complaint: Hernia    Subjective         History of Present Illness  Juan Mohan is a 42 y.o. male presents to [facility] GENERAL SURGERY. The patient is to be seen for inguinal hernia. He is accompanied by an adult female who contributes in his medical history.     The patient has consented to being recorded using KLA[employer].    inguinal hernia.  The patient has been experiencing mild pain on his right lower abdomen, with pain that radiates into his testicles. He describes as a pulling sensation in nature. He has obtained an ultrasound. The adult female reports he will need to stop during intercourse to, secondary to his testicle bulging and he will need to \"push it back in.\" He denies associated nausea and vomiting. He denies being on daily medication for other medical factors.     Surgical history  The patient previously underwent an appendectomy in 1982. Additionally, he had a closed reduction of a distal radius fracture.       Irregular bowel movements  The patient has been experiencing constipation and rectal bleeding.  He previously underwent a colonoscopy, with no abnormal findings.     Objective     Past Medical History:   Diagnosis Date   • Chest wall pain    • Cyst of prostate    • Diverticulosis    • Hernia, inguinal     right   • Hypertension        Past Surgical History:   Procedure Laterality Date   • APPENDECTOMY     • CLOSED REDUCTION Left     left arm         Current Outpatient Medications:   •  amLODIPine (NORVASC) 5 MG tablet, Take 1.5 tablets by mouth Daily., Disp: 90 tablet, Rfl: 1  •  cholecalciferol (VITAMIN D3) 25 MCG (1000 UT) tablet, Take 1,000 Units by mouth Daily., Disp: , Rfl:   •  hydroCHLOROthiazide (HYDRODIURIL) 25 MG tablet, Take 1 tablet by mouth Daily., Disp: 90 tablet, Rfl: 1  •  vitamin C (ASCORBIC ACID) 250 MG tablet, Take 250 mg by mouth Daily., Disp: , Rfl:   •  Zinc Sulfate (ZINC 15 PO), Take  by mouth Daily., Disp: , Rfl:   •  fluticasone (FLONASE) 50 " "MCG/ACT nasal spray, 1 spray into the nostril(s) as directed by provider Daily., Disp: , Rfl:     Allergies   Allergen Reactions   • Codeine Anaphylaxis   • Keflex [Cephalexin] Anaphylaxis   • Penicillins Anaphylaxis        Family History   Problem Relation Age of Onset   • Hypertension Mother    • Stroke Father    • Hypertension Brother    • Heart attack Paternal Grandmother    • Heart disease Paternal Grandmother    • Diabetes Paternal Grandmother    • Cancer Paternal Grandfather         liver       Social History     Socioeconomic History   • Marital status:    Tobacco Use   • Smoking status: Former Smoker     Packs/day: 0.50     Years: 10.00     Pack years: 5.00     Types: Cigarettes     Quit date: 1/3/2020     Years since quittin.1   • Smokeless tobacco: Never Used   Vaping Use   • Vaping Use: Never used   Substance and Sexual Activity   • Alcohol use: Not Currently     Comment: Daily caffeine use   • Drug use: Never   • Sexual activity: Defer       Vital Signs:   Resp 16   Ht 182.9 cm (72\")   Wt 112 kg (246 lb)   BMI 33.36 kg/m²      Physical Exam   No acute distress  Nonlabored breathing  Abdomen is soft     Result Review :            Procedures        Assessment and Plan    There are no diagnoses linked to this encounter.    Reducible right inguinal hernia   - Robotic inguinal hernia repair.  - Risks, benefits, and alternatives discusses extensively.     Discussed with the patient - all questions were answered they voiced understanding and agreed to proceed with above plan        Follow Up   No follow-ups on file.  Patient was given instructions and counseling regarding his condition or for health maintenance advice. Please see specific information pulled into the AVS if appropriate.       Transcribed from ambient dictation for Abelino Davison MD by Tori Spencer.  22   15:56 EST    Patient verbalized consent to the visit recording.  I have personally performed the services described " in this document as transcribed by the above individual, and it is both accurate and complete.  Abelino Davison MD  2/17/2022  17:51 EST

## 2022-02-21 ENCOUNTER — TELEPHONE (OUTPATIENT)
Dept: SURGERY | Facility: CLINIC | Age: 42
End: 2022-02-21

## 2022-02-21 NOTE — TELEPHONE ENCOUNTER
Patient scheduled for inguinal hernia repair laparoscopic with robot on 02/28.    He is in the  and is asking for a note so he does not have to participate in road marches until surgery, due to the weight.  He said he was told not to lift over 15 pounds.

## 2022-02-22 ENCOUNTER — DOCUMENTATION (OUTPATIENT)
Dept: SURGERY | Facility: CLINIC | Age: 42
End: 2022-02-22

## 2022-02-28 ENCOUNTER — HOSPITAL ENCOUNTER (OUTPATIENT)
Facility: HOSPITAL | Age: 42
Setting detail: HOSPITAL OUTPATIENT SURGERY
Discharge: HOME | End: 2022-02-28
Attending: SURGERY | Admitting: SURGERY

## 2022-02-28 ENCOUNTER — ANESTHESIA (OUTPATIENT)
Dept: PERIOP | Facility: HOSPITAL | Age: 42
End: 2022-02-28

## 2022-02-28 ENCOUNTER — ANESTHESIA EVENT (OUTPATIENT)
Dept: PERIOP | Facility: HOSPITAL | Age: 42
End: 2022-02-28

## 2022-02-28 VITALS
BODY MASS INDEX: 32.64 KG/M2 | HEIGHT: 72 IN | DIASTOLIC BLOOD PRESSURE: 69 MMHG | HEART RATE: 69 BPM | RESPIRATION RATE: 16 BRPM | WEIGHT: 240.96 LBS | OXYGEN SATURATION: 94 % | TEMPERATURE: 96.6 F | SYSTOLIC BLOOD PRESSURE: 116 MMHG

## 2022-02-28 DIAGNOSIS — K40.90 RIGHT INGUINAL HERNIA: ICD-10-CM

## 2022-02-28 PROCEDURE — 25010000002 ONDANSETRON PER 1 MG

## 2022-02-28 PROCEDURE — 25010000002 FENTANYL CITRATE (PF) 50 MCG/ML SOLUTION

## 2022-02-28 PROCEDURE — C1781 MESH (IMPLANTABLE): HCPCS | Performed by: SURGERY

## 2022-02-28 PROCEDURE — 25010000002 MIDAZOLAM PER 1 MG: Performed by: ANESTHESIOLOGY

## 2022-02-28 PROCEDURE — 25010000002 PROPOFOL 10 MG/ML EMULSION

## 2022-02-28 PROCEDURE — 25010000002 HYDROMORPHONE PER 4 MG

## 2022-02-28 PROCEDURE — 49650 LAP ING HERNIA REPAIR INIT: CPT | Performed by: SPECIALIST/TECHNOLOGIST, OTHER

## 2022-02-28 PROCEDURE — C1889 IMPLANT/INSERT DEVICE, NOC: HCPCS | Performed by: SURGERY

## 2022-02-28 PROCEDURE — 0 MEPERIDINE PER 100 MG

## 2022-02-28 PROCEDURE — 0 LIDOCAINE 1 % SOLUTION 20 ML VIAL: Performed by: SURGERY

## 2022-02-28 PROCEDURE — 49650 LAP ING HERNIA REPAIR INIT: CPT | Performed by: SURGERY

## 2022-02-28 PROCEDURE — 25010000002 DEXAMETHASONE PER 1 MG

## 2022-02-28 DEVICE — MESH PROGRIP LAP S/FIX ABS 10X15CM BX/2: Type: IMPLANTABLE DEVICE | Site: ABDOMEN | Status: FUNCTIONAL

## 2022-02-28 DEVICE — ABSORBABLE WOUND CLOSURE DEVICE
Type: IMPLANTABLE DEVICE | Site: ABDOMEN | Status: FUNCTIONAL
Brand: V-LOC 180

## 2022-02-28 RX ORDER — ONDANSETRON 2 MG/ML
4 INJECTION INTRAMUSCULAR; INTRAVENOUS ONCE AS NEEDED
Status: DISCONTINUED | OUTPATIENT
Start: 2022-02-28 | End: 2022-02-28 | Stop reason: HOSPADM

## 2022-02-28 RX ORDER — CLINDAMYCIN PHOSPHATE 900 MG/50ML
900 INJECTION INTRAVENOUS ONCE
Status: COMPLETED | OUTPATIENT
Start: 2022-02-28 | End: 2022-02-28

## 2022-02-28 RX ORDER — HYDROMORPHONE HCL 110MG/55ML
PATIENT CONTROLLED ANALGESIA SYRINGE INTRAVENOUS AS NEEDED
Status: DISCONTINUED | OUTPATIENT
Start: 2022-02-28 | End: 2022-02-28 | Stop reason: SURG

## 2022-02-28 RX ORDER — PROMETHAZINE HYDROCHLORIDE 25 MG/1
25 SUPPOSITORY RECTAL ONCE AS NEEDED
Status: DISCONTINUED | OUTPATIENT
Start: 2022-02-28 | End: 2022-02-28 | Stop reason: HOSPADM

## 2022-02-28 RX ORDER — ROCURONIUM BROMIDE 10 MG/ML
INJECTION, SOLUTION INTRAVENOUS AS NEEDED
Status: DISCONTINUED | OUTPATIENT
Start: 2022-02-28 | End: 2022-02-28 | Stop reason: SURG

## 2022-02-28 RX ORDER — MIDAZOLAM HYDROCHLORIDE 1 MG/ML
2 INJECTION INTRAMUSCULAR; INTRAVENOUS ONCE
Status: COMPLETED | OUTPATIENT
Start: 2022-02-28 | End: 2022-02-28

## 2022-02-28 RX ORDER — SODIUM CHLORIDE 0.9 % (FLUSH) 0.9 %
10 SYRINGE (ML) INJECTION AS NEEDED
Status: DISCONTINUED | OUTPATIENT
Start: 2022-02-28 | End: 2022-02-28 | Stop reason: HOSPADM

## 2022-02-28 RX ORDER — LIDOCAINE HYDROCHLORIDE 20 MG/ML
INJECTION, SOLUTION INFILTRATION; PERINEURAL AS NEEDED
Status: DISCONTINUED | OUTPATIENT
Start: 2022-02-28 | End: 2022-02-28 | Stop reason: SURG

## 2022-02-28 RX ORDER — OXYCODONE HYDROCHLORIDE AND ACETAMINOPHEN 5; 325 MG/1; MG/1
1 TABLET ORAL ONCE AS NEEDED
Status: DISCONTINUED | OUTPATIENT
Start: 2022-02-28 | End: 2022-02-28 | Stop reason: HOSPADM

## 2022-02-28 RX ORDER — DOCUSATE SODIUM 100 MG/1
100 CAPSULE, LIQUID FILLED ORAL 2 TIMES DAILY PRN
Qty: 10 CAPSULE | Refills: 1 | Status: SHIPPED | OUTPATIENT
Start: 2022-02-28 | End: 2022-08-22

## 2022-02-28 RX ORDER — OXYCODONE HYDROCHLORIDE AND ACETAMINOPHEN 5; 325 MG/1; MG/1
1-2 TABLET ORAL EVERY 4 HOURS PRN
Qty: 20 TABLET | Refills: 0 | Status: SHIPPED | OUTPATIENT
Start: 2022-02-28 | End: 2022-03-02 | Stop reason: SDUPTHER

## 2022-02-28 RX ORDER — ONDANSETRON 2 MG/ML
INJECTION INTRAMUSCULAR; INTRAVENOUS AS NEEDED
Status: DISCONTINUED | OUTPATIENT
Start: 2022-02-28 | End: 2022-02-28 | Stop reason: SURG

## 2022-02-28 RX ORDER — SODIUM CHLORIDE, SODIUM LACTATE, POTASSIUM CHLORIDE, CALCIUM CHLORIDE 600; 310; 30; 20 MG/100ML; MG/100ML; MG/100ML; MG/100ML
100 INJECTION, SOLUTION INTRAVENOUS CONTINUOUS
Status: DISCONTINUED | OUTPATIENT
Start: 2022-02-28 | End: 2022-02-28 | Stop reason: HOSPADM

## 2022-02-28 RX ORDER — OXYCODONE HYDROCHLORIDE 5 MG/1
10 TABLET ORAL
Status: DISCONTINUED | OUTPATIENT
Start: 2022-02-28 | End: 2022-02-28 | Stop reason: HOSPADM

## 2022-02-28 RX ORDER — PROPOFOL 10 MG/ML
VIAL (ML) INTRAVENOUS AS NEEDED
Status: DISCONTINUED | OUTPATIENT
Start: 2022-02-28 | End: 2022-02-28 | Stop reason: SURG

## 2022-02-28 RX ORDER — DEXAMETHASONE SODIUM PHOSPHATE 4 MG/ML
INJECTION, SOLUTION INTRA-ARTICULAR; INTRALESIONAL; INTRAMUSCULAR; INTRAVENOUS; SOFT TISSUE AS NEEDED
Status: DISCONTINUED | OUTPATIENT
Start: 2022-02-28 | End: 2022-02-28 | Stop reason: SURG

## 2022-02-28 RX ORDER — MEPERIDINE HYDROCHLORIDE 25 MG/ML
12.5 INJECTION INTRAMUSCULAR; INTRAVENOUS; SUBCUTANEOUS
Status: DISCONTINUED | OUTPATIENT
Start: 2022-02-28 | End: 2022-02-28 | Stop reason: HOSPADM

## 2022-02-28 RX ORDER — MAGNESIUM HYDROXIDE 1200 MG/15ML
LIQUID ORAL AS NEEDED
Status: DISCONTINUED | OUTPATIENT
Start: 2022-02-28 | End: 2022-02-28 | Stop reason: HOSPADM

## 2022-02-28 RX ORDER — SODIUM CHLORIDE 0.9 % (FLUSH) 0.9 %
10 SYRINGE (ML) INJECTION EVERY 12 HOURS SCHEDULED
Status: DISCONTINUED | OUTPATIENT
Start: 2022-02-28 | End: 2022-02-28 | Stop reason: HOSPADM

## 2022-02-28 RX ORDER — PROMETHAZINE HYDROCHLORIDE 12.5 MG/1
25 TABLET ORAL ONCE AS NEEDED
Status: DISCONTINUED | OUTPATIENT
Start: 2022-02-28 | End: 2022-02-28 | Stop reason: HOSPADM

## 2022-02-28 RX ORDER — GLYCOPYRROLATE 0.2 MG/ML
0.2 INJECTION INTRAMUSCULAR; INTRAVENOUS
Status: COMPLETED | OUTPATIENT
Start: 2022-02-28 | End: 2022-02-28

## 2022-02-28 RX ORDER — FENTANYL CITRATE 50 UG/ML
INJECTION, SOLUTION INTRAMUSCULAR; INTRAVENOUS AS NEEDED
Status: DISCONTINUED | OUTPATIENT
Start: 2022-02-28 | End: 2022-02-28 | Stop reason: SURG

## 2022-02-28 RX ORDER — ACETAMINOPHEN 500 MG
1000 TABLET ORAL ONCE
Status: COMPLETED | OUTPATIENT
Start: 2022-02-28 | End: 2022-02-28

## 2022-02-28 RX ORDER — SODIUM CHLORIDE, SODIUM LACTATE, POTASSIUM CHLORIDE, CALCIUM CHLORIDE 600; 310; 30; 20 MG/100ML; MG/100ML; MG/100ML; MG/100ML
9 INJECTION, SOLUTION INTRAVENOUS CONTINUOUS PRN
Status: DISCONTINUED | OUTPATIENT
Start: 2022-02-28 | End: 2022-02-28 | Stop reason: HOSPADM

## 2022-02-28 RX ADMIN — OXYCODONE HYDROCHLORIDE 10 MG: 5 TABLET ORAL at 15:02

## 2022-02-28 RX ADMIN — ONDANSETRON 4 MG: 2 INJECTION INTRAMUSCULAR; INTRAVENOUS at 14:22

## 2022-02-28 RX ADMIN — MIDAZOLAM 2 MG: 1 INJECTION INTRAMUSCULAR; INTRAVENOUS at 12:16

## 2022-02-28 RX ADMIN — MEPERIDINE HYDROCHLORIDE 12.5 MG: 25 INJECTION INTRAMUSCULAR; INTRAVENOUS; SUBCUTANEOUS at 14:58

## 2022-02-28 RX ADMIN — SODIUM CHLORIDE, POTASSIUM CHLORIDE, SODIUM LACTATE AND CALCIUM CHLORIDE: 600; 310; 30; 20 INJECTION, SOLUTION INTRAVENOUS at 14:15

## 2022-02-28 RX ADMIN — CLINDAMYCIN IN 5 PERCENT DEXTROSE 900 MG: 18 INJECTION, SOLUTION INTRAVENOUS at 13:09

## 2022-02-28 RX ADMIN — SUGAMMADEX 200 MG: 100 INJECTION, SOLUTION INTRAVENOUS at 14:14

## 2022-02-28 RX ADMIN — PROPOFOL 200 MG: 10 INJECTION, EMULSION INTRAVENOUS at 13:11

## 2022-02-28 RX ADMIN — ROCURONIUM BROMIDE 10 MG: 10 INJECTION INTRAVENOUS at 13:27

## 2022-02-28 RX ADMIN — HYDROMORPHONE HYDROCHLORIDE 1 MG: 2 INJECTION, SOLUTION INTRAMUSCULAR; INTRAVENOUS; SUBCUTANEOUS at 14:33

## 2022-02-28 RX ADMIN — LIDOCAINE HYDROCHLORIDE 100 MG: 20 INJECTION, SOLUTION INFILTRATION; PERINEURAL at 13:11

## 2022-02-28 RX ADMIN — HYDROMORPHONE HYDROCHLORIDE 1 MG: 2 INJECTION, SOLUTION INTRAMUSCULAR; INTRAVENOUS; SUBCUTANEOUS at 14:26

## 2022-02-28 RX ADMIN — ROCURONIUM BROMIDE 40 MG: 10 INJECTION INTRAVENOUS at 13:11

## 2022-02-28 RX ADMIN — PROPOFOL 20 MG: 10 INJECTION, EMULSION INTRAVENOUS at 14:14

## 2022-02-28 RX ADMIN — DEXAMETHASONE SODIUM PHOSPHATE 4 MG: 4 INJECTION INTRA-ARTICULAR; INTRALESIONAL; INTRAMUSCULAR; INTRAVENOUS; SOFT TISSUE at 13:27

## 2022-02-28 RX ADMIN — ONDANSETRON 4 MG: 2 INJECTION INTRAMUSCULAR; INTRAVENOUS at 14:54

## 2022-02-28 RX ADMIN — ACETAMINOPHEN 1000 MG: 500 TABLET ORAL at 11:17

## 2022-02-28 RX ADMIN — GLYCOPYRROLATE 0.2 MG: 0.2 INJECTION INTRAMUSCULAR; INTRAVENOUS at 12:16

## 2022-02-28 RX ADMIN — PROPOFOL 20 MG: 10 INJECTION, EMULSION INTRAVENOUS at 13:39

## 2022-02-28 RX ADMIN — SODIUM CHLORIDE, POTASSIUM CHLORIDE, SODIUM LACTATE AND CALCIUM CHLORIDE 9 ML/HR: 600; 310; 30; 20 INJECTION, SOLUTION INTRAVENOUS at 11:13

## 2022-02-28 RX ADMIN — FENTANYL CITRATE 100 MCG: 50 INJECTION, SOLUTION INTRAMUSCULAR; INTRAVENOUS at 13:11

## 2022-02-28 NOTE — ANESTHESIA POSTPROCEDURE EVALUATION
Patient: Juan Mohan    Procedure Summary     Date: 02/28/22 Room / Location: [facility] OR 08 / [facility] MAIN OR    Anesthesia Start: 1307 Anesthesia Stop: 1436    Procedure: INGUINAL HERNIA REPAIR LAPAROSCOPIC WITH DAVINCI ROBOT (Right Abdomen) Diagnosis:       Right inguinal hernia      (Right inguinal hernia [K40.90])    Surgeons: Abelino Davison MD Provider: Jerry Falcon MD    Anesthesia Type: general ASA Status: 2          Anesthesia Type: general    Vitals  Vitals Value Taken Time   /85 02/28/22 1452   Temp 36.7 °C (98.1 °F) 02/28/22 1431   Pulse 68 02/28/22 1453   Resp 16 02/28/22 1440   SpO2 96 % 02/28/22 1453   Vitals shown include unvalidated device data.        Post Anesthesia Care and Evaluation    Patient location during evaluation: bedside  Patient participation: complete - patient participated  Level of consciousness: awake  Pain management: adequate  Airway patency: patent  Anesthetic complications: No anesthetic complications  PONV Status: none  Cardiovascular status: acceptable  Respiratory status: acceptable  Hydration status: acceptable    Comments: An Anesthesiologist personally participated in the most demanding procedures (including induction and emergence if applicable) in the anesthesia plan, monitored the course of anesthesia administration at frequent intervals and remained physically present and available for immediate diagnosis and treatment of emergencies.

## 2022-02-28 NOTE — ANESTHESIA PREPROCEDURE EVALUATION
Anesthesia Evaluation     Patient summary reviewed and Nursing notes reviewed   no history of anesthetic complications:  NPO Solid Status: > 8 hours  NPO Liquid Status: > 2 hours           Airway   Mallampati: I  TM distance: >3 FB  Neck ROM: full  No difficulty expected  Dental      Pulmonary - negative pulmonary ROS and normal exam    breath sounds clear to auscultation  Cardiovascular - normal exam  Exercise tolerance: good (4-7 METS)    Rhythm: regular  Rate: normal    (+) hypertension,       Neuro/Psych- negative ROS  GI/Hepatic/Renal/Endo    (+)   renal disease,     Musculoskeletal (-) negative ROS    Abdominal    Substance History - negative use     OB/GYN negative ob/gyn ROS         Other - negative ROS                       Anesthesia Plan    ASA 2     general   (Patient understands anesthesia not responsible for dental damage.)  intravenous induction     Anesthetic plan, all risks, benefits, and alternatives have been provided, discussed and informed consent has been obtained with: patient.  Use of blood products discussed with patient .   Plan discussed with CRNA.        CODE STATUS:

## 2022-03-02 ENCOUNTER — TELEPHONE (OUTPATIENT)
Dept: SURGERY | Facility: CLINIC | Age: 42
End: 2022-03-02

## 2022-03-02 DIAGNOSIS — K40.90 RIGHT INGUINAL HERNIA: ICD-10-CM

## 2022-03-02 RX ORDER — OXYCODONE HYDROCHLORIDE AND ACETAMINOPHEN 5; 325 MG/1; MG/1
1-2 TABLET ORAL EVERY 4 HOURS PRN
Qty: 20 TABLET | Refills: 0 | Status: SHIPPED | OUTPATIENT
Start: 2022-03-02 | End: 2022-08-22

## 2022-03-02 NOTE — TELEPHONE ENCOUNTER
Robotic right inguinal hernia repair 02/28.    Patient asked for refill on pain medication.  Pharmacy verified with patient.

## 2022-03-15 ENCOUNTER — OFFICE VISIT (OUTPATIENT)
Dept: SURGERY | Facility: CLINIC | Age: 42
End: 2022-03-15

## 2022-03-15 VITALS — RESPIRATION RATE: 16 BRPM | WEIGHT: 245.2 LBS | HEIGHT: 72 IN | BODY MASS INDEX: 33.21 KG/M2

## 2022-03-15 DIAGNOSIS — R10.814 LEFT LOWER QUADRANT ABDOMINAL TENDERNESS WITHOUT REBOUND TENDERNESS: ICD-10-CM

## 2022-03-15 DIAGNOSIS — K40.90 UNILATERAL INGUINAL HERNIA WITHOUT OBSTRUCTION OR GANGRENE, RECURRENCE NOT SPECIFIED: Primary | ICD-10-CM

## 2022-03-15 PROCEDURE — 99024 POSTOP FOLLOW-UP VISIT: CPT | Performed by: SURGERY

## 2022-03-15 RX ORDER — HYDROCODONE BITARTRATE AND ACETAMINOPHEN 5; 325 MG/1; MG/1
1 TABLET ORAL EVERY 4 HOURS PRN
Qty: 10 TABLET | Refills: 0 | Status: SHIPPED | OUTPATIENT
Start: 2022-03-15 | End: 2022-03-16 | Stop reason: SDUPTHER

## 2022-03-15 NOTE — PROGRESS NOTES
Chief Complaint: Post-op    Subjective         History of Present Illness  Juan Mohan is a 42 y.o. male presents to [facility] GENERAL SURGERY. The patient is to be seen for follow-up after inguinal hernia repair.     Status post inguinal hernia repair.  The patient is still having right lower abdominal pain. He does not feel any bulges. He does believe the pain has improved since surgery. He denies any ingestion, urinary or bowel issues.     Objective     Past Medical History:   Diagnosis Date   • Cyst of prostate     NO PROBLEMS CURRENTLY   • Diverticulosis    • Hernia, inguinal     right   • Hypertension        Past Surgical History:   Procedure Laterality Date   • APPENDECTOMY     • CLOSED REDUCTION Left     left arm   • COLONOSCOPY     • INGUINAL HERNIA REPAIR Right 2/28/2022    Procedure: INGUINAL HERNIA REPAIR LAPAROSCOPIC WITH DAVINCI ROBOT;  Surgeon: Abelino Davison MD;  Location: [facility] OR;  Service: Robotics - DaVinci;  Laterality: Right;         Current Outpatient Medications:   •  amLODIPine (NORVASC) 5 MG tablet, Take 1.5 tablets by mouth Daily. (Patient taking differently: Take 7.5 mg by mouth Daily. TAKES 1 1/2 TABLETS), Disp: 90 tablet, Rfl: 1  •  cholecalciferol (VITAMIN D3) 25 MCG (1000 UT) tablet, Take 1,000 Units by mouth Daily., Disp: , Rfl:   •  docusate sodium (Colace) 100 MG capsule, Take 1 capsule by mouth 2 (Two) Times a Day As Needed for Constipation., Disp: 10 capsule, Rfl: 1  •  hydroCHLOROthiazide (HYDRODIURIL) 25 MG tablet, Take 1 tablet by mouth Daily. (Patient taking differently: Take 25 mg by mouth Daily. INST PER ANESTHESIA PROTOCOL), Disp: 90 tablet, Rfl: 1  •  oxyCODONE-acetaminophen (PERCOCET) 5-325 MG per tablet, Take 1-2 tablets by mouth Every 4 (Four) Hours As Needed (Pain)., Disp: 20 tablet, Rfl: 0  •  vitamin C (ASCORBIC ACID) 250 MG tablet, Take 250 mg by mouth Daily., Disp: , Rfl:   •  Zinc Sulfate (ZINC 15 PO), Take  by mouth Daily.,  Disp: , Rfl:   •  fluticasone (FLONASE) 50 MCG/ACT nasal spray, 1 spray into the nostril(s) as directed by provider Daily As Needed., Disp: , Rfl:   •  HYDROcodone-acetaminophen (Norco) 5-325 MG per tablet, Take 1 tablet by mouth Every 4 (Four) Hours As Needed for Moderate Pain ., Disp: 10 tablet, Rfl: 0    Allergies   Allergen Reactions   • Codeine Anaphylaxis   • Keflex [Cephalexin] Anaphylaxis   • Penicillins Anaphylaxis        Family History   Problem Relation Age of Onset   • Hypertension Mother    • Stroke Father    • Hypertension Brother    • Heart attack Paternal Grandmother    • Heart disease Paternal Grandmother    • Diabetes Paternal Grandmother    • Cancer Paternal Grandfather         liver   • Malig Hyperthermia Neg Hx        Social History     Socioeconomic History   • Marital status:    Tobacco Use   • Smoking status: Former Smoker     Packs/day: 0.50     Years: 10.00     Pack years: 5.00     Types: Cigarettes     Quit date: 1/3/2020     Years since quittin.1   • Smokeless tobacco: Never Used   Vaping Use   • Vaping Use: Never used   Substance and Sexual Activity   • Alcohol use: Not Currently     Comment: Daily caffeine use   • Drug use: Never   • Sexual activity: Defer        Physical Exam  Constitutional:       General: He is not in acute distress.     Appearance: Normal appearance. He is well-developed and normal weight.   Pulmonary:      Effort: Pulmonary effort is normal.      Breath sounds: Normal air entry.   Abdominal:      General: There is no distension.      Palpations: Abdomen is soft.      Tenderness: There is no abdominal tenderness.        Post Surgical Incision  Surgical wound:  Incisions are well healed. No signs of infection. No signs of recurrence even with Valsalva    Result Review :               Assessment and Plan    Diagnoses and all orders for this visit:    1. Unilateral inguinal hernia without obstruction or gangrene, recurrence not specified  (Primary)  Assessment & Plan:  - The patient is doing relatively well, still having some postoperative pain, but that seems to be resolving. I have recommended continuing lifting restriction for two more weeks and we will give him a few more pain pills to cover it. He may follow-up with me as needed.     Orders:  -     HYDROcodone-acetaminophen (Norco) 5-325 MG per tablet; Take 1 tablet by mouth Every 4 (Four) Hours As Needed for Moderate Pain .  Dispense: 10 tablet; Refill: 0      Follow Up   Return for as needed.  Patient was given instructions and counseling regarding his condition or for health maintenance advice. Please see specific information pulled into the AVS if appropriate.       Transcribed from ambient dictation for Abelino Davison MD by Mary Hernandez.  03/15/22   15:44 EDT    Patient verbalized consent to the visit recording.  I have personally performed the services described in this document as transcribed by the above individual, and it is both accurate and complete.  Abelino Davison MD  3/16/2022  20:36 EDT

## 2022-03-15 NOTE — ASSESSMENT & PLAN NOTE
- The patient is doing relatively well, still having some postoperative pain, but that seems to be resolving. I have recommended continuing lifting restriction for two more weeks and we will give him a few more pain pills to cover it. He may follow-up with me as needed.

## 2022-03-16 ENCOUNTER — TELEPHONE (OUTPATIENT)
Dept: SURGERY | Facility: CLINIC | Age: 42
End: 2022-03-16

## 2022-03-16 DIAGNOSIS — K40.90 UNILATERAL INGUINAL HERNIA WITHOUT OBSTRUCTION OR GANGRENE, RECURRENCE NOT SPECIFIED: ICD-10-CM

## 2022-03-16 RX ORDER — HYDROCODONE BITARTRATE AND ACETAMINOPHEN 5; 325 MG/1; MG/1
1 TABLET ORAL EVERY 4 HOURS PRN
Qty: 10 TABLET | Refills: 0 | Status: SHIPPED | OUTPATIENT
Start: 2022-03-16 | End: 2022-08-22

## 2022-03-16 NOTE — TELEPHONE ENCOUNTER
Pt needs the rx for pain med resent to [facility] [address] as the [facility] that it was sent to did not have any pain meds. He was in the office yesterday and is going out of town today. Call patient when this is done.

## 2022-03-30 ENCOUNTER — DOCUMENTATION (OUTPATIENT)
Dept: SURGERY | Facility: CLINIC | Age: 42
End: 2022-03-30

## 2022-03-30 ENCOUNTER — TELEPHONE (OUTPATIENT)
Dept: SURGERY | Facility: CLINIC | Age: 42
End: 2022-03-30

## 2022-03-30 NOTE — TELEPHONE ENCOUNTER
Provider: DR PENDLETON  Caller: DANIEL SARAVIA  Relationship to Patient: SELF    Phone Number: [phone]  Reason for Call: PT IS NEEDING A RETURN TO WORK NOTE. IT NEEDS TO STATE THAT HE HAS NO RESTRICTIONS AND THAT PT IS NO LONGER BEING PRESCRIBED PAIN MEDICATION AND CAN SAFELY DO HIS JOB. PT WILL  THE NOTE, PLEASE GIVE HIM A CALL WHEN THAT IS READY. PT NEEDS THIS NOTE IN THE NEXT WEEK IF POSSIBLE.  When was the patient last seen: 3-15-22 - POST OP APPT, SURGERY WAS 2-28-22

## 2022-03-30 NOTE — TELEPHONE ENCOUNTER
Spoke with the patient and notified him his note is ready for . Patient states he will  his note on Friday.

## 2022-04-01 ENCOUNTER — OFFICE VISIT (OUTPATIENT)
Dept: FAMILY MEDICINE CLINIC | Age: 42
End: 2022-04-01

## 2022-04-01 VITALS
HEART RATE: 76 BPM | BODY MASS INDEX: 34 KG/M2 | HEIGHT: 72 IN | WEIGHT: 251 LBS | SYSTOLIC BLOOD PRESSURE: 122 MMHG | DIASTOLIC BLOOD PRESSURE: 91 MMHG

## 2022-04-01 DIAGNOSIS — M54.2 CERVICALGIA: ICD-10-CM

## 2022-04-01 DIAGNOSIS — I10 PRIMARY HYPERTENSION: Primary | ICD-10-CM

## 2022-04-01 PROCEDURE — 99213 OFFICE O/P EST LOW 20 MIN: CPT | Performed by: NURSE PRACTITIONER

## 2022-08-04 DIAGNOSIS — I10 ESSENTIAL HYPERTENSION: Chronic | ICD-10-CM

## 2022-08-04 RX ORDER — AMLODIPINE BESYLATE 5 MG/1
TABLET ORAL
Qty: 45 TABLET | Refills: 0 | Status: SHIPPED | OUTPATIENT
Start: 2022-08-04 | End: 2022-08-22 | Stop reason: SDUPTHER

## 2022-08-22 ENCOUNTER — OFFICE VISIT (OUTPATIENT)
Dept: UROLOGY | Facility: CLINIC | Age: 42
End: 2022-08-22

## 2022-08-22 ENCOUNTER — LAB (OUTPATIENT)
Dept: LAB | Facility: HOSPITAL | Age: 42
End: 2022-08-22

## 2022-08-22 ENCOUNTER — OFFICE VISIT (OUTPATIENT)
Dept: FAMILY MEDICINE CLINIC | Age: 42
End: 2022-08-22

## 2022-08-22 VITALS
WEIGHT: 252 LBS | DIASTOLIC BLOOD PRESSURE: 87 MMHG | OXYGEN SATURATION: 97 % | BODY MASS INDEX: 34.13 KG/M2 | HEART RATE: 63 BPM | HEIGHT: 72 IN | SYSTOLIC BLOOD PRESSURE: 130 MMHG

## 2022-08-22 VITALS — BODY MASS INDEX: 33.18 KG/M2 | HEIGHT: 72 IN | RESPIRATION RATE: 18 BRPM | WEIGHT: 245 LBS

## 2022-08-22 DIAGNOSIS — M79.674 PAIN OF TOE OF RIGHT FOOT: ICD-10-CM

## 2022-08-22 DIAGNOSIS — E78.5 DYSLIPIDEMIA: ICD-10-CM

## 2022-08-22 DIAGNOSIS — Z13.6 SCREENING FOR CARDIOVASCULAR CONDITION: ICD-10-CM

## 2022-08-22 DIAGNOSIS — B35.1 FUNGAL INFECTION OF NAIL: ICD-10-CM

## 2022-08-22 DIAGNOSIS — M10.9 ACUTE GOUT INVOLVING TOE OF RIGHT FOOT, UNSPECIFIED CAUSE: ICD-10-CM

## 2022-08-22 DIAGNOSIS — N50.811 TESTICULAR PAIN, RIGHT: Primary | ICD-10-CM

## 2022-08-22 DIAGNOSIS — Z13.1 SCREENING FOR DIABETES MELLITUS: ICD-10-CM

## 2022-08-22 DIAGNOSIS — I10 ESSENTIAL HYPERTENSION: Primary | Chronic | ICD-10-CM

## 2022-08-22 DIAGNOSIS — N28.1 RENAL CYST: ICD-10-CM

## 2022-08-22 PROBLEM — K57.92 DIVERTICULITIS: Status: ACTIVE | Noted: 2022-08-22

## 2022-08-22 LAB
ALBUMIN SERPL-MCNC: 4.5 G/DL (ref 3.5–5.2)
ALBUMIN/GLOB SERPL: 2.1 G/DL
ALP SERPL-CCNC: 65 U/L (ref 39–117)
ALT SERPL W P-5'-P-CCNC: 67 U/L (ref 1–41)
ANION GAP SERPL CALCULATED.3IONS-SCNC: 9 MMOL/L (ref 5–15)
AST SERPL-CCNC: 34 U/L (ref 1–40)
BILIRUB SERPL-MCNC: 0.7 MG/DL (ref 0–1.2)
BUN SERPL-MCNC: 10 MG/DL (ref 6–20)
BUN/CREAT SERPL: 10.6 (ref 7–25)
CALCIUM SPEC-SCNC: 9.2 MG/DL (ref 8.6–10.5)
CHLORIDE SERPL-SCNC: 104 MMOL/L (ref 98–107)
CHOLEST SERPL-MCNC: 233 MG/DL (ref 0–200)
CO2 SERPL-SCNC: 25 MMOL/L (ref 22–29)
CREAT SERPL-MCNC: 0.94 MG/DL (ref 0.76–1.27)
EGFRCR SERPLBLD CKD-EPI 2021: 105.1 ML/MIN/1.73
GLOBULIN UR ELPH-MCNC: 2.1 GM/DL
GLUCOSE SERPL-MCNC: 90 MG/DL (ref 65–99)
HBA1C MFR BLD: 4.8 % (ref 4.8–5.6)
HDLC SERPL-MCNC: 39 MG/DL (ref 40–60)
LDLC SERPL CALC-MCNC: 154 MG/DL (ref 0–100)
LDLC/HDLC SERPL: 3.87 {RATIO}
POTASSIUM SERPL-SCNC: 4.2 MMOL/L (ref 3.5–5.2)
PROT SERPL-MCNC: 6.6 G/DL (ref 6–8.5)
SODIUM SERPL-SCNC: 138 MMOL/L (ref 136–145)
TRIGL SERPL-MCNC: 216 MG/DL (ref 0–150)
URATE SERPL-MCNC: 8.2 MG/DL (ref 3.4–7)
VLDLC SERPL-MCNC: 40 MG/DL (ref 5–40)

## 2022-08-22 PROCEDURE — 83036 HEMOGLOBIN GLYCOSYLATED A1C: CPT

## 2022-08-22 PROCEDURE — 80061 LIPID PANEL: CPT

## 2022-08-22 PROCEDURE — 99212 OFFICE O/P EST SF 10 MIN: CPT | Performed by: NURSE PRACTITIONER

## 2022-08-22 PROCEDURE — 84550 ASSAY OF BLOOD/URIC ACID: CPT

## 2022-08-22 PROCEDURE — 80053 COMPREHEN METABOLIC PANEL: CPT

## 2022-08-22 PROCEDURE — 36415 COLL VENOUS BLD VENIPUNCTURE: CPT

## 2022-08-22 PROCEDURE — 99214 OFFICE O/P EST MOD 30 MIN: CPT | Performed by: NURSE PRACTITIONER

## 2022-08-22 RX ORDER — HYDROCHLOROTHIAZIDE 25 MG/1
25 TABLET ORAL DAILY
Qty: 90 TABLET | Refills: 1 | Status: SHIPPED | OUTPATIENT
Start: 2022-08-22 | End: 2023-02-22 | Stop reason: SDUPTHER

## 2022-08-22 RX ORDER — AMLODIPINE BESYLATE 5 MG/1
7.5 TABLET ORAL DAILY
Qty: 135 TABLET | Refills: 1 | Status: SHIPPED | OUTPATIENT
Start: 2022-08-22 | End: 2023-02-22 | Stop reason: SDUPTHER

## 2022-08-22 NOTE — PROGRESS NOTES
Assessment and Plan   Diagnoses and all orders for this visit:    1. Essential hypertension (Primary)  Comments:  BP with better control although diastolic still slighltyl elevated  continue current treatment and lifestlye changes recommended   Orders:  -     hydroCHLOROthiazide (HYDRODIURIL) 25 MG tablet; Take 1 tablet by mouth Daily.  Dispense: 90 tablet; Refill: 1  -     amLODIPine (NORVASC) 5 MG tablet; Take 1.5 tablets by mouth Daily.  Dispense: 135 tablet; Refill: 1    2. Pain of toe of right foot  Comments:  may be arthritis, vs gout.Will check levels  if persists, consider referral to podiatry  Orders:  -     Uric acid; Future    3. Fungal infection of nail  Comments:  discussedOTC treatment, discussed difficult to treat  may take months  follow up if no improvement for referral to podiatry    4. Screening for diabetes mellitus  -     Hemoglobin A1c; Future    5. Screening for cardiovascular condition  -     Comprehensive metabolic panel; Future  -     Lipid panel; Future                  Follow Up   Return in about 6 months (around 2/22/2023), or if symptoms worsen or fail to improve, for Recheck.    Chief Complaint  Juan Mohan presents to [facility] for Hypertension (Follow up)    Subjective          Juan presents for follow up on hypertension.  Compliance with medication is reported as good   Check of BP at home reported as well controlled.  No new concerns or problems to report.    Juan also reports that his right great toe has been throbbing.  He denies any injury - no erythema that he is aware. He does have a fungal nail infection that has been present for years on much of his toes on both feet.  He has no history of gout.  He does not drink alcohol.         Review of Systems    Objective     Vitals:    08/22/22 1132   BP: 130/87   BP Location: Left arm   Patient Position: Sitting   Cuff Size: Adult   Pulse: 63   SpO2: 97%   Weight: 114 kg (252 lb)   Height:  "182.9 cm (72\")     Body mass index is 34.18 kg/m².     Physical Exam  Vitals reviewed.   Constitutional:       Appearance: Normal appearance.   HENT:      Head: Normocephalic.   Eyes:      Pupils: Pupils are equal, round, and reactive to light.   Cardiovascular:      Rate and Rhythm: Normal rate and regular rhythm.      Heart sounds: No murmur heard.  Pulmonary:      Effort: Pulmonary effort is normal.      Breath sounds: Normal breath sounds.   Musculoskeletal:         General: Normal range of motion.   Feet:      Right foot:      Toenail Condition: Fungal disease present.     Left foot:      Toenail Condition: Left toenails are abnormally thick. Fungal disease present.  Neurological:      Mental Status: He is alert.   Psychiatric:         Mood and Affect: Mood normal.         Behavior: Behavior normal.         Result Review                        Allergies   Allergen Reactions   • Codeine Anaphylaxis   • Keflex [Cephalexin] Anaphylaxis   • Penicillins Anaphylaxis      Past Medical History:   Diagnosis Date   • Cyst of prostate     NO PROBLEMS CURRENTLY   • Diverticulosis    • Hernia, inguinal     right   • Hypertension      Current Outpatient Medications   Medication Sig Dispense Refill   • amLODIPine (NORVASC) 5 MG tablet Take 1.5 tablets by mouth Daily. 135 tablet 1   • hydroCHLOROthiazide (HYDRODIURIL) 25 MG tablet Take 1 tablet by mouth Daily. 90 tablet 1     No current facility-administered medications for this visit.     Past Surgical History:   Procedure Laterality Date   • APPENDECTOMY     • CLOSED REDUCTION Left     left arm   • COLONOSCOPY     • INGUINAL HERNIA REPAIR Right 2/28/2022    Procedure: INGUINAL HERNIA REPAIR LAPAROSCOPIC WITH [employer] ROBOT;  Surgeon: Abelino Davison MD;  Location: [facility];  Service: Robotics - DaVinci;  Laterality: Right;      Health Maintenance Due   Topic Date Due   • ANNUAL PHYSICAL  Never done   • TDAP/TD VACCINES (1 - Tdap) Never done   • HEPATITIS C SCREENING  " Never done   • COVID-19 Vaccine (3 - Booster for Moderna series) 02/22/2022      Immunization History   Administered Date(s) Administered   • COVID-19 (MODERNA) 1st, 2nd, 3rd Dose Only 08/12/2021, 09/22/2021   • COVID-19 (MODERNA) BOOSTER 08/12/2021, 09/22/2021   • Flu Vaccine Quad PF >36MO 09/08/2016   • Fluzone Quad >6mos (Multi-dose) 09/08/2016

## 2022-08-22 NOTE — PROGRESS NOTES
"Chief Complaint: Testicle Pain (States he still gets some pain) and renal cyst    Subjective         History of Present Illness  Juan Mohan is a 42 y.o. male presents to [facility]OLOG[facility] to be seen for f/u on CT scan.    Time testicular scrotal ultrasound performed on 2/8/2022 which revealed suspected fat-containing inguinal hernia in the right inguinal region.  Trace right hydrocele.  4 mm right epididymal cyst.  Moderate sized varicocele.  Moderate to moderately large left varicocele.    Patient had a right inguinal hernia repair performed on 2/28/2022.    He states that he still has where his right testicle \"goes up inside\"    He states testicular pain has gotten much better.       Previous:  Patient was last seen in 7/2021 after an ED visit for abdominal pain. He had a CT scan which revealed several renal cysts but needed further imaging to determine the eitiology.     Patient had CT scan w/wo performed on 1/3/2022.    He today states that he has been having some right sided testicular pain and sometimes feels as if his testicles \"go up inside\" of him.      Objective     Past Medical History:   Diagnosis Date   • Cyst of prostate     NO PROBLEMS CURRENTLY   • Diverticulosis    • Hernia, inguinal     right   • Hypertension        Past Surgical History:   Procedure Laterality Date   • APPENDECTOMY     • CLOSED REDUCTION Left     left arm   • COLONOSCOPY     • INGUINAL HERNIA REPAIR Right 2/28/2022    Procedure: INGUINAL HERNIA REPAIR LAPAROSCOPIC WITH DAVINCI ROBOT;  Surgeon: Abelino Davison MD;  Location: [facility];  Service: Robotics - DaVinci;  Laterality: Right;         Current Outpatient Medications:   •  amLODIPine (NORVASC) 5 MG tablet, TAKE 1 AND 1/2 TABLET BY MOUTH DAILY, Disp: 45 tablet, Rfl: 0  •  hydroCHLOROthiazide (HYDRODIURIL) 25 MG tablet, Take 1 tablet by mouth Daily. (Patient taking differently: Take 25 mg by mouth Daily. INST PER ANESTHESIA PROTOCOL), Disp: 90 " "tablet, Rfl: 1  •  cholecalciferol (VITAMIN D3) 25 MCG (1000 UT) tablet, Take 1,000 Units by mouth Daily., Disp: , Rfl:   •  docusate sodium (Colace) 100 MG capsule, Take 1 capsule by mouth 2 (Two) Times a Day As Needed for Constipation., Disp: 10 capsule, Rfl: 1  •  HYDROcodone-acetaminophen (Norco) 5-325 MG per tablet, Take 1 tablet by mouth Every 4 (Four) Hours As Needed for Moderate Pain ., Disp: 10 tablet, Rfl: 0  •  oxyCODONE-acetaminophen (PERCOCET) 5-325 MG per tablet, Take 1-2 tablets by mouth Every 4 (Four) Hours As Needed (Pain)., Disp: 20 tablet, Rfl: 0  •  vitamin C (ASCORBIC ACID) 250 MG tablet, Take 250 mg by mouth Daily., Disp: , Rfl:   •  Zinc Sulfate (ZINC 15 PO), Take  by mouth Daily., Disp: , Rfl:     Allergies   Allergen Reactions   • Codeine Anaphylaxis   • Keflex [Cephalexin] Anaphylaxis   • Penicillins Anaphylaxis        Family History   Problem Relation Age of Onset   • Hypertension Mother    • Stroke Father    • Hypertension Brother    • Heart attack Paternal Grandmother    • Heart disease Paternal Grandmother    • Diabetes Paternal Grandmother    • Cancer Paternal Grandfather         liver   • Malig Hyperthermia Neg Hx        Social History     Socioeconomic History   • Marital status:    Tobacco Use   • Smoking status: Former Smoker     Packs/day: 0.50     Years: 10.00     Pack years: 5.00     Types: Cigarettes     Quit date: 1/3/2020     Years since quittin.6   • Smokeless tobacco: Never Used   Vaping Use   • Vaping Use: Never used   Substance and Sexual Activity   • Alcohol use: Not Currently     Comment: Daily caffeine use   • Drug use: Never   • Sexual activity: Defer       Vital Signs:   Resp 18   Ht 182.9 cm (72\")   Wt 111 kg (245 lb)   BMI 33.23 kg/m²      Physical Exam  Genitourinary:     Comments: ttp right inguinal canal         Result Review :   The following data was reviewed by: ANAI Castañeda on 2022:  Results for orders placed or performed in " visit on 01/17/22   Comprehensive Metabolic Panel    Specimen: Blood   Result Value Ref Range    Glucose 96 65 - 99 mg/dL    BUN 11 6 - 20 mg/dL    Creatinine 0.95 0.76 - 1.27 mg/dL    Sodium 140 136 - 145 mmol/L    Potassium 4.4 3.5 - 5.2 mmol/L    Chloride 103 98 - 107 mmol/L    CO2 27.6 22.0 - 29.0 mmol/L    Calcium 9.9 8.6 - 10.5 mg/dL    Total Protein 6.8 6.0 - 8.5 g/dL    Albumin 4.90 3.50 - 5.20 g/dL    ALT (SGPT) 100 (H) 1 - 41 U/L    AST (SGOT) 31 1 - 40 U/L    Alkaline Phosphatase 86 39 - 117 U/L    Total Bilirubin 0.5 0.0 - 1.2 mg/dL    eGFR Non African Amer 88 >60 mL/min/1.73    Globulin 1.9 gm/dL    A/G Ratio 2.6 g/dL    BUN/Creatinine Ratio 11.6 7.0 - 25.0    Anion Gap 9.4 5.0 - 15.0 mmol/L   Lipid Panel    Specimen: Blood   Result Value Ref Range    Total Cholesterol 236 (H) 0 - 200 mg/dL    Triglycerides 259 (H) 0 - 150 mg/dL    HDL Cholesterol 39 (L) 40 - 60 mg/dL    LDL Cholesterol  149 (H) 0 - 100 mg/dL    VLDL Cholesterol 48 (H) 5 - 40 mg/dL    LDL/HDL Ratio 3.72    CBC Auto Differential    Specimen: Blood   Result Value Ref Range    WBC 5.72 3.40 - 10.80 10*3/mm3    RBC 4.90 4.14 - 5.80 10*6/mm3    Hemoglobin 15.7 13.0 - 17.7 g/dL    Hematocrit 44.4 37.5 - 51.0 %    MCV 90.6 79.0 - 97.0 fL    MCH 32.0 26.6 - 33.0 pg    MCHC 35.4 31.5 - 35.7 g/dL    RDW 12.0 (L) 12.3 - 15.4 %    RDW-SD 40.1 37.0 - 54.0 fl    MPV 10.8 6.0 - 12.0 fL    Platelets 238 140 - 450 10*3/mm3    Neutrophil % 60.3 42.7 - 76.0 %    Lymphocyte % 25.5 19.6 - 45.3 %    Monocyte % 11.5 5.0 - 12.0 %    Eosinophil % 1.9 0.3 - 6.2 %    Basophil % 0.5 0.0 - 1.5 %    Immature Grans % 0.3 0.0 - 0.5 %    Neutrophils, Absolute 3.44 1.70 - 7.00 10*3/mm3    Lymphocytes, Absolute 1.46 0.70 - 3.10 10*3/mm3    Monocytes, Absolute 0.66 0.10 - 0.90 10*3/mm3    Eosinophils, Absolute 0.11 0.00 - 0.40 10*3/mm3    Basophils, Absolute 0.03 0.00 - 0.20 10*3/mm3    Immature Grans, Absolute 0.02 0.00 - 0.05 10*3/mm3            Procedures         Assessment and Plan    Diagnoses and all orders for this visit:    1. Testicular pain, right (Primary)    2. Renal cyst    in regards to renal cyst we will f/u imaging in 1/2023.     If pain worsens or returns he will call the office for a sooner appointment.    I spent 10  minutes caring for Juan on this date of service. This time includes time spent by me in the following activities:reviewing tests, obtaining and/or reviewing a separately obtained history, performing a medically appropriate examination and/or evaluation , counseling and educating the patient/family/caregiver, ordering medications, tests, or procedures, and documenting information in the medical record  Follow Up   Return in about 23 weeks (around 1/30/2023).  Patient was given instructions and counseling regarding his condition or for health maintenance advice. Please see specific information pulled into the AVS if appropriate.         This document has been electronically signed by ANAI Castañeda  August 22, 2022 10:12 EDT

## 2022-08-23 PROBLEM — M10.9 ACUTE GOUT INVOLVING TOE OF RIGHT FOOT: Status: ACTIVE | Noted: 2022-08-23

## 2022-08-23 RX ORDER — PREDNISONE 20 MG/1
20 TABLET ORAL DAILY
Qty: 5 TABLET | Refills: 0 | Status: SHIPPED | OUTPATIENT
Start: 2022-08-23 | End: 2022-12-19

## 2022-08-23 RX ORDER — ATORVASTATIN CALCIUM 20 MG/1
20 TABLET, FILM COATED ORAL DAILY
Qty: 90 TABLET | Refills: 1 | Status: SHIPPED | OUTPATIENT
Start: 2022-08-23 | End: 2023-02-22 | Stop reason: SDUPTHER

## 2022-12-19 ENCOUNTER — OFFICE VISIT (OUTPATIENT)
Dept: FAMILY MEDICINE CLINIC | Age: 42
End: 2022-12-19

## 2022-12-19 VITALS
DIASTOLIC BLOOD PRESSURE: 99 MMHG | SYSTOLIC BLOOD PRESSURE: 141 MMHG | BODY MASS INDEX: 34.27 KG/M2 | HEIGHT: 72 IN | OXYGEN SATURATION: 97 % | TEMPERATURE: 98.1 F | HEART RATE: 75 BPM | WEIGHT: 253 LBS

## 2022-12-19 DIAGNOSIS — H92.09 EARACHE: ICD-10-CM

## 2022-12-19 DIAGNOSIS — M77.12 LATERAL EPICONDYLITIS OF LEFT ELBOW: ICD-10-CM

## 2022-12-19 DIAGNOSIS — R09.81 NASAL CONGESTION: ICD-10-CM

## 2022-12-19 DIAGNOSIS — J01.91 ACUTE RECURRENT SINUSITIS, UNSPECIFIED LOCATION: Primary | ICD-10-CM

## 2022-12-19 LAB
EXPIRATION DATE: NORMAL
FLUAV AG UPPER RESP QL IA.RAPID: NOT DETECTED
FLUBV AG UPPER RESP QL IA.RAPID: NOT DETECTED
INTERNAL CONTROL: NORMAL
Lab: NORMAL
SARS-COV-2 AG UPPER RESP QL IA.RAPID: NOT DETECTED

## 2022-12-19 PROCEDURE — 87428 SARSCOV & INF VIR A&B AG IA: CPT | Performed by: NURSE PRACTITIONER

## 2022-12-19 PROCEDURE — 99213 OFFICE O/P EST LOW 20 MIN: CPT | Performed by: NURSE PRACTITIONER

## 2022-12-19 RX ORDER — DOXYCYCLINE HYCLATE 100 MG/1
100 CAPSULE ORAL 2 TIMES DAILY
Qty: 10 CAPSULE | Refills: 0 | Status: SHIPPED | OUTPATIENT
Start: 2022-12-19 | End: 2022-12-24

## 2022-12-19 NOTE — PROGRESS NOTES
Assessment and Plan   Follow Up   Return in about 3 months (around 3/19/2023) for Recheck.      Diagnoses and all orders for this visit:    1. Acute recurrent sinusitis, unspecified location (Primary)  Comments:  saline spray, complete medication as recommended;  mucinex may help with increase in fluids    2. Nasal congestion  Comments:  saline spray  Orders:  -     POCT SARS-CoV-2 Antigen SUMAN + Flu  -     doxycycline (VIBRAMYCIN) 100 MG capsule; Take 1 capsule by mouth 2 (Two) Times a Day for 5 days.  Dispense: 10 capsule; Refill: 0    3. Lateral epicondylitis of left elbow  Comments:  nsaids, strap to be worn when using at all times to prevent further pain/discomfort.     4. Earache  -     POCT SARS-CoV-2 Antigen SUMAN + Flu            New Medications Ordered This Visit   Medications   • doxycycline (VIBRAMYCIN) 100 MG capsule     Sig: Take 1 capsule by mouth 2 (Two) Times a Day for 5 days.     Dispense:  10 capsule     Refill:  0        Medications Discontinued During This Encounter   Medication Reason   • predniSONE (DELTASONE) 20 MG tablet *Therapy completed              Chief Complaint  Juan Mohan presents to [facility] for Nasal Congestion (Patient states this has been going on for a week. ), Earache (With drainage ), and Elbow Pain (Left elbow pain radiating down hand with numbness for 1 month )    Subjective          History of Present Illness  Patient here today for concerns of possible URI or covid / flu.      Known Exposure to positive case?  no  Date of exposure?   n/a  Date of symptoms start?   1 week  (Symptoms may appear 2-14 days after exposure )    Fever or chills?   no  Cough?   yes  Shortness of breath or difficulty breathing?  yes  Fatigue?   no  Muscle or body aches?  no   Headache?   yes  New loss of taste or smell? no  Sore throat?  no  Congestion or runny nose? yes  Nausea or vomiting?   no  Diarrhea?   no      Any  emergency warning signs for COVID-19.  "  Trouble breathing?  no  Persistent pain or pressure in the chest?   no  New confusion? no  Inability to wake or stay awake?no  Pale, gray, or blue-colored skin, lips, or nail beds, depending on skin tone?   no    Taking any medications at home to help with symptoms?no  Any prior vaccine to covid?   yes  Any prior vaccine to flu this season? yes  Any significant health problems / existing lung / heart problems?  no    Left elbow with pain with weight bearing - x 1 month .  He does do repetitive motions in his job and may have triggered problem. He has been trying to rest, NSAIDs but no improvement.              Review of Systems    Objective     Vitals:    12/19/22 1039   BP: 141/99   BP Location: Left arm   Patient Position: Sitting   Cuff Size: Large Adult   Pulse: 75   Temp: 98.1 °F (36.7 °C)   TempSrc: Oral   SpO2: 97%   Weight: 115 kg (253 lb)   Height: 182.9 cm (72\")     Body mass index is 34.31 kg/m².     Physical Exam  Vitals and nursing note reviewed.   Constitutional:       Appearance: Normal appearance. He is ill-appearing.   HENT:      Head: Normocephalic.      Right Ear: Tympanic membrane and ear canal normal. Drainage and tenderness present. No middle ear effusion. There is no impacted cerumen. Tympanic membrane is not scarred or erythematous.      Left Ear: Tympanic membrane and ear canal normal. Drainage and tenderness present.  No middle ear effusion. There is no impacted cerumen. Tympanic membrane is not scarred or erythematous.      Nose:      Right Sinus: No maxillary sinus tenderness or frontal sinus tenderness.      Left Sinus: No maxillary sinus tenderness or frontal sinus tenderness.      Mouth/Throat:      Pharynx: No pharyngeal swelling, oropharyngeal exudate, posterior oropharyngeal erythema or uvula swelling.      Tonsils: No tonsillar exudate. 0 on the right. 0 on the left.   Eyes:      Pupils: Pupils are equal, round, and reactive to light.   Cardiovascular:      Rate and Rhythm: " Normal rate and regular rhythm.      Heart sounds: No murmur heard.  Pulmonary:      Effort: Pulmonary effort is normal.      Breath sounds: Normal breath sounds. No wheezing or rhonchi.   Musculoskeletal:         General: Normal range of motion.      Left elbow: Tenderness present in lateral epicondyle.   Lymphadenopathy:      Head:      Right side of head: No submandibular or preauricular adenopathy.      Left side of head: No submandibular or preauricular adenopathy.   Skin:     General: Skin is warm and dry.   Neurological:      Mental Status: He is alert and oriented to person, place, and time.   Psychiatric:         Attention and Perception: Attention and perception normal.         Mood and Affect: Mood normal.         Behavior: Behavior normal.         Result Review                         Allergies   Allergen Reactions   • Codeine Anaphylaxis   • Keflex [Cephalexin] Anaphylaxis   • Penicillins Anaphylaxis      Past Medical History:   Diagnosis Date   • Cyst of prostate     NO PROBLEMS CURRENTLY   • Diverticulosis    • Hernia, inguinal     right   • Hypertension      Current Outpatient Medications   Medication Sig Dispense Refill   • amLODIPine (NORVASC) 5 MG tablet Take 1.5 tablets by mouth Daily. 135 tablet 1   • atorvastatin (LIPITOR) 20 MG tablet Take 1 tablet by mouth Daily. 90 tablet 1   • hydroCHLOROthiazide (HYDRODIURIL) 25 MG tablet Take 1 tablet by mouth Daily. 90 tablet 1     No current facility-administered medications for this visit.     Past Surgical History:   Procedure Laterality Date   • APPENDECTOMY     • CLOSED REDUCTION Left     left arm   • COLONOSCOPY     • INGUINAL HERNIA REPAIR Right 2/28/2022    Procedure: INGUINAL HERNIA REPAIR LAPAROSCOPIC WITH [employer] ROBOT;  Surgeon: Abelino Davison MD;  Location: [facility];  Service: Robotics - DaVinci;  Laterality: Right;      Health Maintenance Due   Topic Date Due   • TDAP/TD VACCINES (1 - Tdap) Never done   • HEPATITIS C SCREENING   Never done   • ANNUAL PHYSICAL  Never done   • COVID-19 Vaccine (3 - Booster for Moderna series) 11/17/2021   • INFLUENZA VACCINE  08/01/2022      Immunization History   Administered Date(s) Administered   • COVID-19 (MODERNA) 1st, 2nd, 3rd Dose Only 08/12/2021, 09/22/2021   • COVID-19 (MODERNA) BOOSTER 08/12/2021, 09/22/2021   • Flu Vaccine Quad PF >36MO 09/08/2016   • Fluzone Quad >6mos (Multi-dose) 09/08/2016

## 2023-01-20 ENCOUNTER — OFFICE VISIT (OUTPATIENT)
Dept: FAMILY MEDICINE CLINIC | Age: 43
End: 2023-01-20
Payer: COMMERCIAL

## 2023-01-20 VITALS
OXYGEN SATURATION: 98 % | HEIGHT: 72 IN | TEMPERATURE: 97.4 F | SYSTOLIC BLOOD PRESSURE: 132 MMHG | BODY MASS INDEX: 34.75 KG/M2 | WEIGHT: 256.6 LBS | DIASTOLIC BLOOD PRESSURE: 85 MMHG | HEART RATE: 65 BPM

## 2023-01-20 DIAGNOSIS — R11.2 NAUSEA AND VOMITING, UNSPECIFIED VOMITING TYPE: Primary | ICD-10-CM

## 2023-01-20 PROCEDURE — 87428 SARSCOV & INF VIR A&B AG IA: CPT | Performed by: NURSE PRACTITIONER

## 2023-01-20 PROCEDURE — 99213 OFFICE O/P EST LOW 20 MIN: CPT | Performed by: NURSE PRACTITIONER

## 2023-01-20 NOTE — PROGRESS NOTES
"Chief Complaint  Vomiting (Sx started Monday ) and Diarrhea    Subjective        Juan Mohan presents to [facility]  Vomiting   This is a new problem. The current episode started in the past 7 days. The problem has been resolved. Associated symptoms include diarrhea. Pertinent negatives include no chills or fever. Risk factors include ill contacts. He has tried nothing for the symptoms. The treatment provided no relief.   Diarrhea   This is a new problem. The current episode started in the past 7 days. The problem has been resolved. Associated symptoms include vomiting. Pertinent negatives include no chills or fever. Risk factors include ill contacts. He has tried nothing for the symptoms. The treatment provided no relief.       Objective   Vital Signs:  /85 (BP Location: Right arm, Patient Position: Sitting, Cuff Size: Large Adult)   Pulse 65   Temp 97.4 °F (36.3 °C) (Oral)   Ht 182.9 cm (72\")   Wt 116 kg (256 lb 9.6 oz)   SpO2 98% Comment: room air  BMI 34.80 kg/m²   Estimated body mass index is 34.8 kg/m² as calculated from the following:    Height as of this encounter: 182.9 cm (72\").    Weight as of this encounter: 116 kg (256 lb 9.6 oz).             Physical Exam  HENT:      Head: Normocephalic.   Cardiovascular:      Rate and Rhythm: Normal rate.   Pulmonary:      Effort: Pulmonary effort is normal.   Abdominal:      General: Bowel sounds are normal.      Palpations: Abdomen is soft.      Tenderness: There is no abdominal tenderness. There is no guarding or rebound.   Skin:     General: Skin is warm and dry.   Neurological:      Mental Status: He is alert and oriented to person, place, and time.   Psychiatric:         Mood and Affect: Mood normal.        Result Review :          Results for orders placed or performed in visit on 01/20/23   POCT SARS-CoV-2 Antigen SUMAN + Flu    Specimen: Swab   Result Value Ref Range    SARS Antigen Not Detected Not Detected, " Presumptive Negative    Influenza A Antigen SUMAN Not Detected Not Detected    Influenza B Antigen SUMAN Not Detected Not Detected    Internal Control Passed Passed    Lot Number [id number]     Expiration Date 11/9/23               Assessment and Plan   Diagnoses and all orders for this visit:    1. Nausea and vomiting, unspecified vomiting type (Primary)  Comments:  Force fluids. Follow up in one week if symtpoms persist  Orders:  -     POCT SARS-CoV-2 Antigen SUMAN + Flu             Follow Up   Return if symptoms worsen or fail to improve.  Patient was given instructions and counseling regarding his condition or for health maintenance advice. Please see specific information pulled into the AVS if appropriate.

## 2023-01-20 NOTE — LETTER
January 20, 2023     Patient: Juan Mohan   YOB: 1980   Date of Visit: 1/20/2023       To Whom It May Concern:    It is my medical opinion that Juan Mohan should be excused from work 1/18 thru 1/20/2023, may return to work on 1/24/2023.           Sincerely,        ANAI Bennett    CC: No Recipients

## 2023-01-30 ENCOUNTER — TELEPHONE (OUTPATIENT)
Dept: UROLOGY | Facility: CLINIC | Age: 43
End: 2023-01-30
Payer: COMMERCIAL

## 2023-01-30 NOTE — TELEPHONE ENCOUNTER
SPOKE TO PT'S SPOUSE TO TRY TO R/S NO SHOWED APPT WITH MARTI FROM 1/30 IN [place] AND SHE STATED SHE WOULD HAVE HIM CALL BACK TO R/S/MS

## 2023-02-22 ENCOUNTER — LAB (OUTPATIENT)
Dept: LAB | Facility: HOSPITAL | Age: 43
End: 2023-02-22
Payer: COMMERCIAL

## 2023-02-22 ENCOUNTER — OFFICE VISIT (OUTPATIENT)
Dept: FAMILY MEDICINE CLINIC | Age: 43
End: 2023-02-22
Payer: COMMERCIAL

## 2023-02-22 VITALS
SYSTOLIC BLOOD PRESSURE: 136 MMHG | DIASTOLIC BLOOD PRESSURE: 95 MMHG | HEART RATE: 60 BPM | WEIGHT: 255.4 LBS | HEIGHT: 72 IN | BODY MASS INDEX: 34.59 KG/M2 | TEMPERATURE: 97.7 F | OXYGEN SATURATION: 96 %

## 2023-02-22 DIAGNOSIS — Z13.6 SCREENING FOR CARDIOVASCULAR CONDITION: ICD-10-CM

## 2023-02-22 DIAGNOSIS — Q61.02 MULTIPLE RENAL CYSTS: ICD-10-CM

## 2023-02-22 DIAGNOSIS — R74.8 ELEVATED LIVER ENZYMES: ICD-10-CM

## 2023-02-22 DIAGNOSIS — E66.09 CLASS 1 OBESITY DUE TO EXCESS CALORIES WITH SERIOUS COMORBIDITY AND BODY MASS INDEX (BMI) OF 34.0 TO 34.9 IN ADULT: ICD-10-CM

## 2023-02-22 DIAGNOSIS — Z11.59 SCREENING FOR VIRAL DISEASE: ICD-10-CM

## 2023-02-22 DIAGNOSIS — M77.12 LATERAL EPICONDYLITIS OF LEFT ELBOW: ICD-10-CM

## 2023-02-22 DIAGNOSIS — G89.29 CHRONIC LEFT SHOULDER PAIN: ICD-10-CM

## 2023-02-22 DIAGNOSIS — Z00.00 ROUTINE GENERAL MEDICAL EXAMINATION AT A HEALTH CARE FACILITY: Primary | ICD-10-CM

## 2023-02-22 DIAGNOSIS — K59.00 CONSTIPATION, UNSPECIFIED CONSTIPATION TYPE: ICD-10-CM

## 2023-02-22 DIAGNOSIS — E78.5 DYSLIPIDEMIA: ICD-10-CM

## 2023-02-22 DIAGNOSIS — I10 ESSENTIAL HYPERTENSION: Chronic | ICD-10-CM

## 2023-02-22 DIAGNOSIS — M25.512 CHRONIC LEFT SHOULDER PAIN: ICD-10-CM

## 2023-02-22 PROBLEM — M10.9 ACUTE GOUT INVOLVING TOE OF RIGHT FOOT: Status: RESOLVED | Noted: 2022-08-23 | Resolved: 2023-02-22

## 2023-02-22 PROBLEM — R07.2 PRECORDIAL PAIN: Status: RESOLVED | Noted: 2021-02-27 | Resolved: 2023-02-22

## 2023-02-22 PROBLEM — M79.674 PAIN OF TOE OF RIGHT FOOT: Status: RESOLVED | Noted: 2022-08-22 | Resolved: 2023-02-22

## 2023-02-22 PROBLEM — R10.814 LEFT LOWER QUADRANT ABDOMINAL TENDERNESS WITHOUT REBOUND TENDERNESS: Status: RESOLVED | Noted: 2021-07-08 | Resolved: 2023-02-22

## 2023-02-22 PROBLEM — K57.90 DIVERTICULOSIS: Status: ACTIVE | Noted: 2023-02-22

## 2023-02-22 PROBLEM — K57.92 DIVERTICULITIS: Status: RESOLVED | Noted: 2022-08-22 | Resolved: 2023-02-22

## 2023-02-22 PROBLEM — B35.1 FUNGAL INFECTION OF NAIL: Status: RESOLVED | Noted: 2022-08-22 | Resolved: 2023-02-22

## 2023-02-22 LAB
ALBUMIN SERPL-MCNC: 4.7 G/DL (ref 3.5–5.2)
ALBUMIN/GLOB SERPL: 1.7 G/DL
ALP SERPL-CCNC: 74 U/L (ref 39–117)
ALT SERPL W P-5'-P-CCNC: 153 U/L (ref 1–41)
ANION GAP SERPL CALCULATED.3IONS-SCNC: 9.9 MMOL/L (ref 5–15)
AST SERPL-CCNC: 86 U/L (ref 1–40)
BILIRUB SERPL-MCNC: 0.9 MG/DL (ref 0–1.2)
BUN SERPL-MCNC: 11 MG/DL (ref 6–20)
BUN/CREAT SERPL: 10.4 (ref 7–25)
CALCIUM SPEC-SCNC: 9.6 MG/DL (ref 8.6–10.5)
CHLORIDE SERPL-SCNC: 105 MMOL/L (ref 98–107)
CHOLEST SERPL-MCNC: 180 MG/DL (ref 0–200)
CO2 SERPL-SCNC: 27.1 MMOL/L (ref 22–29)
CREAT SERPL-MCNC: 1.06 MG/DL (ref 0.76–1.27)
EGFRCR SERPLBLD CKD-EPI 2021: 90.4 ML/MIN/1.73
GLOBULIN UR ELPH-MCNC: 2.8 GM/DL
GLUCOSE SERPL-MCNC: 98 MG/DL (ref 65–99)
HDLC SERPL-MCNC: 45 MG/DL (ref 40–60)
LDLC SERPL CALC-MCNC: 114 MG/DL (ref 0–100)
LDLC/HDLC SERPL: 2.47 {RATIO}
POTASSIUM SERPL-SCNC: 4.4 MMOL/L (ref 3.5–5.2)
PROT SERPL-MCNC: 7.5 G/DL (ref 6–8.5)
SODIUM SERPL-SCNC: 142 MMOL/L (ref 136–145)
TRIGL SERPL-MCNC: 119 MG/DL (ref 0–150)
VLDLC SERPL-MCNC: 21 MG/DL (ref 5–40)

## 2023-02-22 PROCEDURE — 99396 PREV VISIT EST AGE 40-64: CPT | Performed by: NURSE PRACTITIONER

## 2023-02-22 PROCEDURE — 36415 COLL VENOUS BLD VENIPUNCTURE: CPT

## 2023-02-22 PROCEDURE — 80053 COMPREHEN METABOLIC PANEL: CPT

## 2023-02-22 PROCEDURE — 80061 LIPID PANEL: CPT

## 2023-02-22 PROCEDURE — 99213 OFFICE O/P EST LOW 20 MIN: CPT | Performed by: NURSE PRACTITIONER

## 2023-02-22 RX ORDER — HYDROCHLOROTHIAZIDE 25 MG/1
25 TABLET ORAL DAILY
Qty: 90 TABLET | Refills: 1 | Status: SHIPPED | OUTPATIENT
Start: 2023-02-22

## 2023-02-22 RX ORDER — AMLODIPINE BESYLATE 5 MG/1
7.5 TABLET ORAL DAILY
Qty: 135 TABLET | Refills: 1 | Status: SHIPPED | OUTPATIENT
Start: 2023-02-22

## 2023-02-22 RX ORDER — ATORVASTATIN CALCIUM 20 MG/1
20 TABLET, FILM COATED ORAL DAILY
Qty: 90 TABLET | Refills: 1 | Status: SHIPPED | OUTPATIENT
Start: 2023-02-22

## 2023-02-22 NOTE — PROGRESS NOTES
Chief Complaint  Juan Mohan presents to [facility] for Annual Exam (CC: HTN, dyslipidemia /Pt states that LT elbow is still causing pain, along with tingling in fingers. /Also concerned re weight gain within the past year )      Subjective     History of Present Illness  Juan is here today for annual exam.   Last annual exam was 1 year  Last eye exam: 1 year  PROVIDER:  n/a  Last dental exam:   2 years    PROVIDER:  Xiao  Diet / exercise:   Well balanced diet and exercise regularly  Patient's Body mass index is 34.64 kg/m². indicating that he is obese (BMI >30)  Satisfied with weight?  no    Patient Care Team:  Josy Vinson APRN as PCP - General (Nurse Practitioner)  Abelino Davison MD as Consulting Physician (General Surgery)     Left elbow with continued pain over lateral edge  Has been using copper sleeve.  He does have a history of radius and ulnar fracture in his teens but no recurrent problems since then until over the past few months. 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 He is also having some shoulder discomfort radiating down into his chest wall.  It is his left shoulder - he does previously have a clavicle fracture from years ago.  Again, repetative movement in his arms with his job        Assessment and Plan       Diagnoses and all orders for this visit:    1. Routine general medical examination at a health care facility (Primary)  Comments:  diet and exercise, annual wellness, health mainteance recommendations discussed     2. Multiple renal cysts  Comments:  Will check CT for follow up as recommended - was due in January   Orders:  -     Cancel: CT Abdomen Pelvis With & Without Contrast; Future  -     CT Abdomen Pelvis With & Without Contrast; Future    3. Constipation, unspecified constipation type  Comments:  daily Miralax and  follow up PRN     4. Essential hypertension  Comments:  improved, continue home monitoring and follow up if remains elevated;  otherwise 6 months    Orders:  -     amLODIPine (NORVASC) 5 MG tablet; Take 1.5 tablets by mouth Daily.  Dispense: 135 tablet; Refill: 1  -     hydroCHLOROthiazide (HYDRODIURIL) 25 MG tablet; Take 1 tablet by mouth Daily.  Dispense: 90 tablet; Refill: 1    5. Dyslipidemia  Comments:  continue current treatment will check labs today; addendum- suspect liver enzymes elevated due to recent start of medication- hold and repeat labs 6 weeks  Orders:  -     atorvastatin (LIPITOR) 20 MG tablet; Take 1 tablet by mouth Daily.  Dispense: 90 tablet; Refill: 1    6. Chronic left shoulder pain  Comments:  follow up with ortho as discussed   Orders:  -     Ambulatory Referral to Orthopedic Surgery    7. Screening for cardiovascular condition  -     Lipid panel; Future  -     Comprehensive metabolic panel; Future    8. Screening for viral disease  -     Hepatitis C antibody; Future    9. Lateral epicondylitis of left elbow  -     Ambulatory Referral to Orthopedic Surgery    10. Elevated liver enzymes  Comments:  suspect due to statin initiation- holding and repeat CMP 6 weeks  Orders:  -     Comprehensive metabolic panel; Future    11. Class 1 obesity due to excess calories with serious comorbidity and body mass index (BMI) of 34.0 to 34.9 in adult  Comments:  weight loss may improve chronic conditions including HLD, HTN        Follow Up   Return in about 6 months (around 8/22/2023), or if symptoms worsen or fail to improve, for Recheck.      New Medications Ordered This Visit   Medications   • amLODIPine (NORVASC) 5 MG tablet     Sig: Take 1.5 tablets by mouth Daily.     Dispense:  135 tablet     Refill:  1   • atorvastatin (LIPITOR) 20 MG tablet     Sig: Take 1 tablet by mouth Daily.     Dispense:  90 tablet     Refill:  1   • hydroCHLOROthiazide (HYDRODIURIL) 25 MG tablet     Sig: Take 1 tablet by mouth  "Daily.     Dispense:  90 tablet     Refill:  1       Medications Discontinued During This Encounter   Medication Reason   • hydroCHLOROthiazide (HYDRODIURIL) 25 MG tablet Reorder   • amLODIPine (NORVASC) 5 MG tablet Reorder   • atorvastatin (LIPITOR) 20 MG tablet Reorder            Review of Systems    Objective     Vitals:    02/22/23 0908   BP: 136/95   BP Location: Right arm   Patient Position: Sitting   Cuff Size: Adult   Pulse: 60   Temp: 97.7 °F (36.5 °C)   TempSrc: Oral   SpO2: 96%   Weight: 116 kg (255 lb 6.4 oz)   Height: 182.9 cm (72\")     Body mass index is 34.64 kg/m².     Physical Exam  Vitals reviewed.   Constitutional:       Appearance: Normal appearance. He is obese.   HENT:      Head: Normocephalic.      Mouth/Throat:      Mouth: Mucous membranes are moist.      Pharynx: Oropharynx is clear.   Eyes:      Conjunctiva/sclera: Conjunctivae normal.   Cardiovascular:      Rate and Rhythm: Normal rate and regular rhythm.      Heart sounds: No murmur heard.  Pulmonary:      Effort: Pulmonary effort is normal.      Breath sounds: Normal breath sounds.   Abdominal:      General: Bowel sounds are normal.      Tenderness: There is no abdominal tenderness.   Musculoskeletal:         General: Normal range of motion.      Left shoulder: Tenderness present.      Left elbow: Tenderness present in lateral epicondyle.        Arms:       Cervical back: Normal range of motion.      Comments: Pain in left lateral elbow with rotation of the wrist   Skin:     General: Skin is warm and dry.   Neurological:      General: No focal deficit present.      Mental Status: He is alert. Mental status is at baseline.   Psychiatric:         Mood and Affect: Mood normal.         Behavior: Behavior normal.         Thought Content: Thought content normal.            Result Review                       Allergies   Allergen Reactions   • Codeine Anaphylaxis   • Keflex [Cephalexin] Anaphylaxis   • Penicillins Anaphylaxis      Past Medical " History:   Diagnosis Date   • Cyst of prostate     NO PROBLEMS CURRENTLY   • Diverticulosis    • Hernia, inguinal     right   • Hypertension      Current Outpatient Medications   Medication Sig Dispense Refill   • amLODIPine (NORVASC) 5 MG tablet Take 1.5 tablets by mouth Daily. 135 tablet 1   • atorvastatin (LIPITOR) 20 MG tablet Take 1 tablet by mouth Daily. 90 tablet 1   • hydroCHLOROthiazide (HYDRODIURIL) 25 MG tablet Take 1 tablet by mouth Daily. 90 tablet 1     No current facility-administered medications for this visit.     Past Surgical History:   Procedure Laterality Date   • APPENDECTOMY     • CLOSED REDUCTION Left     left arm   • COLONOSCOPY     • INGUINAL HERNIA REPAIR Right 2/28/2022    Procedure: INGUINAL HERNIA REPAIR LAPAROSCOPIC WITH [employer]CI ROBOT;  Surgeon: Abelino Davison MD;  Location: [facility];  Service: Robotics - DaVinci;  Laterality: Right;      Health Maintenance Due   Topic Date Due   • TDAP/TD VACCINES (1 - Tdap) Never done   • HEPATITIS C SCREENING  Never done      Immunization History   Administered Date(s) Administered   • COVID-19 (MODERNA) 1st, 2nd, 3rd Dose Only 08/12/2021, 09/22/2021   • Flu Vaccine Quad PF >36MO 09/08/2016   • FluLaval/Fluzone >6mos 10/13/2022   • Fluzone Quad >6mos (Multi-dose) 09/08/2016

## 2023-03-06 ENCOUNTER — HOSPITAL ENCOUNTER (OUTPATIENT)
Dept: CT IMAGING | Facility: HOSPITAL | Age: 43
Discharge: HOME | End: 2023-03-06
Admitting: NURSE PRACTITIONER
Payer: COMMERCIAL

## 2023-03-06 DIAGNOSIS — Q61.02 MULTIPLE RENAL CYSTS: ICD-10-CM

## 2023-03-06 LAB
CREAT BLDA-MCNC: 1 MG/DL
EGFRCR SERPLBLD CKD-EPI 2021: 97 ML/MIN/1.73

## 2023-03-06 PROCEDURE — 25510000001 IOPAMIDOL PER 1 ML: Performed by: NURSE PRACTITIONER

## 2023-03-06 PROCEDURE — 74178 CT ABD&PLV WO CNTR FLWD CNTR: CPT

## 2023-03-06 PROCEDURE — 82565 ASSAY OF CREATININE: CPT

## 2023-03-06 RX ADMIN — IOPAMIDOL 100 ML: 755 INJECTION, SOLUTION INTRAVENOUS at 12:38

## 2023-03-24 DIAGNOSIS — M25.522 LEFT ELBOW PAIN: ICD-10-CM

## 2023-03-24 DIAGNOSIS — M25.512 LEFT SHOULDER PAIN, UNSPECIFIED CHRONICITY: Primary | ICD-10-CM

## 2023-04-11 ENCOUNTER — TELEPHONE (OUTPATIENT)
Dept: FAMILY MEDICINE CLINIC | Age: 43
End: 2023-04-11
Payer: COMMERCIAL

## 2023-04-13 ENCOUNTER — HOSPITAL ENCOUNTER (OUTPATIENT)
Dept: GENERAL RADIOLOGY | Facility: HOSPITAL | Age: 43
Discharge: HOME | End: 2023-04-13
Payer: COMMERCIAL

## 2023-04-13 ENCOUNTER — OFFICE VISIT (OUTPATIENT)
Dept: ORTHOPEDIC SURGERY | Facility: CLINIC | Age: 43
End: 2023-04-13
Payer: COMMERCIAL

## 2023-04-13 ENCOUNTER — OFFICE VISIT (OUTPATIENT)
Dept: FAMILY MEDICINE CLINIC | Age: 43
End: 2023-04-13
Payer: COMMERCIAL

## 2023-04-13 ENCOUNTER — LAB (OUTPATIENT)
Dept: LAB | Facility: HOSPITAL | Age: 43
End: 2023-04-13
Payer: COMMERCIAL

## 2023-04-13 VITALS — BODY MASS INDEX: 33.64 KG/M2 | WEIGHT: 248.4 LBS | TEMPERATURE: 97.3 F | HEIGHT: 72 IN

## 2023-04-13 VITALS
HEART RATE: 65 BPM | BODY MASS INDEX: 33.67 KG/M2 | DIASTOLIC BLOOD PRESSURE: 93 MMHG | SYSTOLIC BLOOD PRESSURE: 132 MMHG | OXYGEN SATURATION: 99 % | HEIGHT: 72 IN | WEIGHT: 248.6 LBS

## 2023-04-13 DIAGNOSIS — R13.10 DYSPHAGIA, UNSPECIFIED TYPE: ICD-10-CM

## 2023-04-13 DIAGNOSIS — M25.512 LEFT SHOULDER PAIN, UNSPECIFIED CHRONICITY: ICD-10-CM

## 2023-04-13 DIAGNOSIS — R74.8 ELEVATED LIVER ENZYMES: ICD-10-CM

## 2023-04-13 DIAGNOSIS — M25.511 RIGHT SHOULDER PAIN, UNSPECIFIED CHRONICITY: ICD-10-CM

## 2023-04-13 DIAGNOSIS — K92.0 HEMATEMESIS WITH NAUSEA: Primary | ICD-10-CM

## 2023-04-13 DIAGNOSIS — M25.522 LEFT ELBOW PAIN: ICD-10-CM

## 2023-04-13 DIAGNOSIS — M25.511 RIGHT SHOULDER PAIN, UNSPECIFIED CHRONICITY: Primary | ICD-10-CM

## 2023-04-13 LAB
ALBUMIN SERPL-MCNC: 4.8 G/DL (ref 3.5–5.2)
ALBUMIN/GLOB SERPL: 1.8 G/DL
ALP SERPL-CCNC: 63 U/L (ref 39–117)
ALT SERPL W P-5'-P-CCNC: 52 U/L (ref 1–41)
ANION GAP SERPL CALCULATED.3IONS-SCNC: 11.9 MMOL/L (ref 5–15)
AST SERPL-CCNC: 34 U/L (ref 1–40)
BILIRUB SERPL-MCNC: 1.4 MG/DL (ref 0–1.2)
BUN SERPL-MCNC: 17 MG/DL (ref 6–20)
BUN/CREAT SERPL: 16.5 (ref 7–25)
CALCIUM SPEC-SCNC: 9.9 MG/DL (ref 8.6–10.5)
CHLORIDE SERPL-SCNC: 100 MMOL/L (ref 98–107)
CO2 SERPL-SCNC: 26.1 MMOL/L (ref 22–29)
CREAT SERPL-MCNC: 1.03 MG/DL (ref 0.76–1.27)
EGFRCR SERPLBLD CKD-EPI 2021: 93.6 ML/MIN/1.73
GLOBULIN UR ELPH-MCNC: 2.7 GM/DL
GLUCOSE SERPL-MCNC: 98 MG/DL (ref 65–99)
POTASSIUM SERPL-SCNC: 3.8 MMOL/L (ref 3.5–5.2)
PROT SERPL-MCNC: 7.5 G/DL (ref 6–8.5)
SODIUM SERPL-SCNC: 138 MMOL/L (ref 136–145)

## 2023-04-13 PROCEDURE — 80053 COMPREHEN METABOLIC PANEL: CPT

## 2023-04-13 PROCEDURE — 36415 COLL VENOUS BLD VENIPUNCTURE: CPT

## 2023-04-13 PROCEDURE — 99203 OFFICE O/P NEW LOW 30 MIN: CPT | Performed by: ORTHOPAEDIC SURGERY

## 2023-04-13 PROCEDURE — 73030 X-RAY EXAM OF SHOULDER: CPT

## 2023-04-13 PROCEDURE — 73080 X-RAY EXAM OF ELBOW: CPT

## 2023-04-13 NOTE — PROGRESS NOTES
Subjective     CHIEF COMPLAINT    Chief Complaint   Patient presents with   • Difficulty Swallowing     Feels like food gets stuck            History of Present Illness  This is a 43-year-old male presenting to the clinic complaining of difficulty swallowing for the last week or so.  He reports that he feels like food is getting stuck in his esophagus.  He actually vomited yesterday after eating steak and reports that there was blood in his vomit.  He denies any abdominal pain or diarrhea.  He states he drinks bourbon on the weekends.  Denies any bleeding from other areas including nosebleeds, blood in stool, and easy bruising.              Review of Systems   Constitutional: Negative for chills and fever.   HENT: Positive for trouble swallowing. Negative for nosebleeds.    Gastrointestinal: Positive for nausea and vomiting. Negative for blood in stool.            Past Medical History:   Diagnosis Date   • Cyst of prostate     NO PROBLEMS CURRENTLY   • Diverticulosis    • Hernia, inguinal     right   • Hypertension             Past Surgical History:   Procedure Laterality Date   • APPENDECTOMY     • CLOSED REDUCTION Left     left arm   • COLONOSCOPY     • INGUINAL HERNIA REPAIR Right 2/28/2022    Procedure: INGUINAL HERNIA REPAIR LAPAROSCOPIC WITH DAVINCI ROBOT;  Surgeon: Abelino Davison MD;  Location: [facility];  Service: Robotics - DaVinci;  Laterality: Right;            Family History   Problem Relation Age of Onset   • Hypertension Mother    • Stroke Father    • Hypertension Brother    • Heart attack Paternal Grandmother    • Heart disease Paternal Grandmother    • Diabetes Paternal Grandmother    • Cancer Paternal Grandfather         liver   • Malig Hyperthermia Neg Hx             Social History     Socioeconomic History   • Marital status:    Tobacco Use   • Smoking status: Former     Packs/day: 0.50     Years: 10.00     Pack years: 5.00     Types: Cigarettes     Quit date: 1/3/2020     Years  "since quitting: 3.2   • Smokeless tobacco: Never   Vaping Use   • Vaping Use: Never used   Substance and Sexual Activity   • Alcohol use: Yes     Comment: occ   • Drug use: Never   • Sexual activity: Defer            Allergies   Allergen Reactions   • Codeine Anaphylaxis   • Keflex [Cephalexin] Anaphylaxis   • Penicillins Anaphylaxis            Current Outpatient Medications on File Prior to Visit   Medication Sig Dispense Refill   • amLODIPine (NORVASC) 5 MG tablet Take 1.5 tablets by mouth Daily. 135 tablet 1   • hydroCHLOROthiazide (HYDRODIURIL) 25 MG tablet Take 1 tablet by mouth Daily. 90 tablet 1   • [DISCONTINUED] atorvastatin (LIPITOR) 20 MG tablet Take 1 tablet by mouth Daily. 90 tablet 1     No current facility-administered medications on file prior to visit.            /93 (BP Location: Left arm, Patient Position: Sitting, Cuff Size: Adult)   Pulse 65   Ht 182.9 cm (72\")   Wt 113 kg (248 lb 9.6 oz)   SpO2 99% Comment: on room air  BMI 33.72 kg/m²          Objective     Physical Exam  Vitals and nursing note reviewed.   Constitutional:       General: He is not in acute distress.     Appearance: Normal appearance.   HENT:      Head: Normocephalic and atraumatic.   Eyes:      General: No scleral icterus.     Extraocular Movements: Extraocular movements intact.      Conjunctiva/sclera: Conjunctivae normal.      Pupils: Pupils are equal, round, and reactive to light.   Cardiovascular:      Rate and Rhythm: Normal rate and regular rhythm.      Heart sounds: Normal heart sounds.   Pulmonary:      Effort: Pulmonary effort is normal. No respiratory distress.      Breath sounds: Normal breath sounds.   Abdominal:      General: There is no distension.      Palpations: Abdomen is soft.      Tenderness: There is no abdominal tenderness.   Skin:     General: Skin is warm and dry.   Neurological:      Mental Status: He is alert and oriented to person, place, and time.   Psychiatric:         Mood and Affect: " Mood normal.         Behavior: Behavior normal.              Procedures                    Lab Results (last 24 hours)     Procedure Component Value Units Date/Time    Comprehensive metabolic panel [[id number]] Collected: 04/13/23 0919    Specimen: Blood Updated: 04/13/23 0920                Recommend soft diet until evaluated by gastroenterology.  Contact the office if symptoms are worsening.       Diagnoses and all orders for this visit:    1. Hematemesis with nausea (Primary)  -     Cancel: Ambulatory Referral to Gastroenterology  -     Ambulatory Referral to Gastroenterology    2. Dysphagia, unspecified type  -     Ambulatory Referral to Gastroenterology                           FOR FULL DISCHARGE INSTRUCTIONS/COMMENTS/HANDOUTS please see the   AVS

## 2023-04-13 NOTE — PROGRESS NOTES
"Chief Complaint  Establish Care and Pain of the Left Shoulder    Subjective    History of Present Illness      Juan Mohan is a 43 y.o. male who presents to [facility] for bilateral shoulder pain and discomfort.  History of Present Illness patient has bilateral shoulder pain and discomfort.  The right is a little bit more symptomatic than the left.  He does have left shoulder pain as well.  Pain is worse with abduction.  He has difficulty with reaching into the overhead abducted position.  The patient works for the [employer] and states that he has been painting in the overhead, abducted position for the past several weeks.  He denies any history of trauma to the shoulder.  There is no history of dislocation of the shoulder.  Symptoms have been going on for 2 months or so.  He rates his pain as a 7 on a scale of 1-10.  He describes a dull aching burning sensation on the lateral aspect the shoulder associate with some clicking and popping.  He states that he has tried anti-inflammatory medication which does seem to help him to some degree.  Pain Location: LEFT shoulder  Radiation: none  Quality: aching, burning, shooting  Intensity/Severity: moderate  Duration: 2 months  Progression of symptoms: yes, progressive worsening  Onset quality: gradual   Timing: intermittent  Aggravating Factors: working, lifting heavy objects  Alleviating Factors: NSAIDs, rest  Previous Episodes: yes  Associated Symptoms: pain, clicking/popping, decreased ROM, decreased strength  ADLs Affected: ambulating, work related activities, recreational activities/sports  Previous Treatment: NSAIDs       Objective   Vital Signs:   Temp 97.3 °F (36.3 °C)   Ht 182.9 cm (72\")   Wt 113 kg (248 lb 6.4 oz)   BMI 33.69 kg/m²     Physical Exam  Physical Exam  Vitals signs and nursing note reviewed.   Constitutional:       Appearance: Normal appearance.   Pulmonary:      Effort: Pulmonary " effort is normal.   Skin:     General: Skin is warm and dry.      Capillary Refill: Capillary refill takes less than 2 seconds.   Neurological:      General: No focal deficit present.      Mental Status: He is alert and oriented to person, place, and time. Mental status is at baseline.   Psychiatric:         Mood and Affect: Mood normal.         Behavior: Behavior normal.         Thought Content: Thought content normal.         Judgment: Judgment normal.     Ortho Exam   Bilateral shoulder (impingement). Patient has signs of impingement with internal and external rotation. There is a lot of pain and tenderness for the patient over the subcromial bursa. Booker's sign is positive. Neer sign is positive. Forward flexion is 0-120 degrees, abduction is 0-120 degrees, external rotation is 0-30 degrees. Rotator cuff function is fairly well preserved except for the impingement at 90 degrees. Apprehension sign is negative. Axillary nerve function is well preserved. Radial artery pulses are palpable. There is no evidence of multidirectional instability. Sulcus sign is negative. Drop arm sign is negative. The patient has some ill-defined tenderness over the greater tuberosity of the humerus. The pain level is 5.          Result Review :   The following data was reviewed by: Jordy Rivera MD on 04/13/2023:  Radiologic studies - see below for interpretation  LEFT shoulder xrays  weightbearing/standing ap/lateral views were ordered by Jordy Rivera MD. Performed at [facility] on 04/13/23. Images were independently viewed and interpreted by myself, my impression as follows:    left Shoulder X-Ray  Indication: Pain and discomfort on the lateral aspect of the shoulder.  AP and Lateral  Findings: Sclerosis over the greater tuberosity of the humerus.  No fractures are noted.  no bony lesion  Soft tissues within normal limits  decreased joint spaces  Hardware appropriately positioned not applicable      no prior  studies available for comparison.    X-RAY was ordered and reviewed by Jordy Rivera MD            Procedures           Assessment   Assessment and Plan    Diagnoses and all orders for this visit:    1. Right shoulder pain, unspecified chronicity (Primary)  -     MRI shoulder right wo contrast; Future  -     XR Shoulder 2+ View Right; Future          Follow Up   · Calcium and vitamin D for bone health.  · Glucosamine, chondroitin and turmeric for cartilage health.  · My recommendations are to go ahead and get an MRI of the right shoulder for evaluation of glenoid labrum tear versus a possible rotator cuff tear.  · If he does have a rotator cuff tear he might be looking at surgical intervention.  · If he does not have a rotator cuff tear then he can most certainly be treated with conservative, nonoperative management.  · Discussed with the patient that he needs to cut back on his overhead abduction activities because that certainly seems to be making his symptoms worse and his pain a lot more intense.  · Rest, ice, compression, and elevation (RICE) therapy  · Stretching and strengthening exercises of the flexors and abductors of the shoulder to prevent arthrofibrosis.  · OTC Alternate Ibuprofen and Tylenol as needed  · Follow up in 6 week(s)  • Patient was given instructions and counseling regarding his condition or for health maintenance advice. Please see specific information pulled into the AVS if appropriate.     Jrody Rivera MD   Date of Encounter: 4/13/2023   Electronically signed by Jordy Rivera MD, 04/13/23, 10:52 AM EDT.     EMR Dragon/Transcription disclaimer:  Much of this encounter note is an electronic transcription/translation of spoken language to printed text. The electronic translation of spoken language may permit erroneous, or at times, nonsensical words or phrases to be inadvertently transcribed; Although I have reviewed the note for such errors, some may still exist.

## 2023-04-13 NOTE — Clinical Note
I noticed you are following for elevated liver enzymes.  I am unsure if this is related to that or not.

## 2023-04-14 ENCOUNTER — PATIENT ROUNDING (BHMG ONLY) (OUTPATIENT)
Dept: ORTHOPEDIC SURGERY | Facility: CLINIC | Age: 43
End: 2023-04-14
Payer: COMMERCIAL

## 2023-04-14 NOTE — PROGRESS NOTES
April 14, 2023    A [employer] Message has been sent to the patient for PATIENT ROUNDING with [facility]

## 2023-04-17 ENCOUNTER — TELEPHONE (OUTPATIENT)
Dept: GASTROENTEROLOGY | Facility: CLINIC | Age: 43
End: 2023-04-17

## 2023-04-17 ENCOUNTER — TELEPHONE (OUTPATIENT)
Dept: OTHER | Facility: OTHER | Age: 43
End: 2023-04-17

## 2023-04-17 NOTE — TELEPHONE ENCOUNTER
Caller: Juan Mohan    Relationship to patient: Self    Best call back number: [phone]  CAN CALL ANYTIME BETWEEN 8-12    Patient is needing: WOULD LIKE TO SCHEDULE SCOPE W DR LEW

## 2023-04-18 ENCOUNTER — TRANSCRIBE ORDERS (OUTPATIENT)
Dept: FAMILY MEDICINE CLINIC | Age: 43
End: 2023-04-18
Payer: COMMERCIAL

## 2023-04-18 ENCOUNTER — PRE-PROCEDURE SCREENING (OUTPATIENT)
Dept: GASTROENTEROLOGY | Facility: CLINIC | Age: 43
End: 2023-04-18
Payer: COMMERCIAL

## 2023-04-18 DIAGNOSIS — K92.0 HEMATEMESIS WITH NAUSEA: Primary | ICD-10-CM

## 2023-04-18 DIAGNOSIS — R13.10 DYSPHAGIA, UNSPECIFIED TYPE: ICD-10-CM

## 2023-04-18 NOTE — TELEPHONE ENCOUNTER
Caller: MAXIMO SARAVIA    Relationship to patient: Emergency Contact    Best call back number: [phone]    Patient is needing: PATIENT MISSED CALL FOR OA.  PLEASE REACH BACK OUT.  THANK YOU

## 2023-04-27 PROBLEM — M25.511 RIGHT SHOULDER PAIN: Status: ACTIVE | Noted: 2023-04-27

## 2023-08-25 DIAGNOSIS — I10 ESSENTIAL HYPERTENSION: Chronic | ICD-10-CM

## 2023-08-25 RX ORDER — AMLODIPINE BESYLATE 5 MG/1
TABLET ORAL
Qty: 135 TABLET | Refills: 1 | Status: SHIPPED | OUTPATIENT
Start: 2023-08-25 | End: 2023-08-28 | Stop reason: SDUPTHER

## 2023-08-28 ENCOUNTER — LAB (OUTPATIENT)
Dept: LAB | Facility: HOSPITAL | Age: 43
End: 2023-08-28
Payer: COMMERCIAL

## 2023-08-28 ENCOUNTER — OFFICE VISIT (OUTPATIENT)
Dept: FAMILY MEDICINE CLINIC | Age: 43
End: 2023-08-28
Payer: COMMERCIAL

## 2023-08-28 VITALS
WEIGHT: 249 LBS | HEART RATE: 62 BPM | DIASTOLIC BLOOD PRESSURE: 96 MMHG | OXYGEN SATURATION: 98 % | SYSTOLIC BLOOD PRESSURE: 141 MMHG | HEIGHT: 72 IN | BODY MASS INDEX: 33.72 KG/M2

## 2023-08-28 DIAGNOSIS — I10 ESSENTIAL HYPERTENSION: Chronic | ICD-10-CM

## 2023-08-28 DIAGNOSIS — R00.2 PALPITATIONS: ICD-10-CM

## 2023-08-28 DIAGNOSIS — R11.2 NAUSEA AND VOMITING, UNSPECIFIED VOMITING TYPE: ICD-10-CM

## 2023-08-28 DIAGNOSIS — Z13.6 SCREENING FOR CARDIOVASCULAR CONDITION: ICD-10-CM

## 2023-08-28 DIAGNOSIS — Z11.59 SCREENING FOR VIRAL DISEASE: ICD-10-CM

## 2023-08-28 DIAGNOSIS — I10 ESSENTIAL HYPERTENSION: Primary | Chronic | ICD-10-CM

## 2023-08-28 LAB
ALBUMIN SERPL-MCNC: 5.1 G/DL (ref 3.5–5.2)
ALBUMIN/GLOB SERPL: 2.1 G/DL
ALP SERPL-CCNC: 71 U/L (ref 39–117)
ALT SERPL W P-5'-P-CCNC: 54 U/L (ref 1–41)
ANION GAP SERPL CALCULATED.3IONS-SCNC: 12.3 MMOL/L (ref 5–15)
AST SERPL-CCNC: 42 U/L (ref 1–40)
BILIRUB SERPL-MCNC: 1.3 MG/DL (ref 0–1.2)
BUN SERPL-MCNC: 14 MG/DL (ref 6–20)
BUN/CREAT SERPL: 12.1 (ref 7–25)
CALCIUM SPEC-SCNC: 10 MG/DL (ref 8.6–10.5)
CHLORIDE SERPL-SCNC: 102 MMOL/L (ref 98–107)
CO2 SERPL-SCNC: 27.7 MMOL/L (ref 22–29)
CREAT SERPL-MCNC: 1.16 MG/DL (ref 0.76–1.27)
DEPRECATED RDW RBC AUTO: 39.4 FL (ref 37–54)
EGFRCR SERPLBLD CKD-EPI 2021: 81.1 ML/MIN/1.73
ERYTHROCYTE [DISTWIDTH] IN BLOOD BY AUTOMATED COUNT: 12 % (ref 12.3–15.4)
GLOBULIN UR ELPH-MCNC: 2.4 GM/DL
GLUCOSE SERPL-MCNC: 103 MG/DL (ref 65–99)
HCT VFR BLD AUTO: 45.1 % (ref 37.5–51)
HCV AB SER DONR QL: NORMAL
HGB BLD-MCNC: 15.9 G/DL (ref 13–17.7)
MAGNESIUM SERPL-MCNC: 2.3 MG/DL (ref 1.6–2.6)
MCH RBC QN AUTO: 31.4 PG (ref 26.6–33)
MCHC RBC AUTO-ENTMCNC: 35.3 G/DL (ref 31.5–35.7)
MCV RBC AUTO: 89.1 FL (ref 79–97)
PLATELET # BLD AUTO: 232 10*3/MM3 (ref 140–450)
PMV BLD AUTO: 9.9 FL (ref 6–12)
POTASSIUM SERPL-SCNC: 4 MMOL/L (ref 3.5–5.2)
PROT SERPL-MCNC: 7.5 G/DL (ref 6–8.5)
RBC # BLD AUTO: 5.06 10*6/MM3 (ref 4.14–5.8)
SODIUM SERPL-SCNC: 142 MMOL/L (ref 136–145)
TSH SERPL DL<=0.05 MIU/L-ACNC: 2.59 UIU/ML (ref 0.27–4.2)
WBC NRBC COR # BLD: 4.85 10*3/MM3 (ref 3.4–10.8)

## 2023-08-28 PROCEDURE — 80050 GENERAL HEALTH PANEL: CPT

## 2023-08-28 PROCEDURE — 86803 HEPATITIS C AB TEST: CPT

## 2023-08-28 PROCEDURE — 36415 COLL VENOUS BLD VENIPUNCTURE: CPT

## 2023-08-28 PROCEDURE — 83735 ASSAY OF MAGNESIUM: CPT

## 2023-08-28 PROCEDURE — 99214 OFFICE O/P EST MOD 30 MIN: CPT | Performed by: NURSE PRACTITIONER

## 2023-08-28 RX ORDER — HYDROCHLOROTHIAZIDE 25 MG/1
25 TABLET ORAL DAILY
Qty: 90 TABLET | Refills: 1 | Status: SHIPPED | OUTPATIENT
Start: 2023-08-28

## 2023-08-28 RX ORDER — OMEPRAZOLE 40 MG/1
CAPSULE, DELAYED RELEASE ORAL
COMMUNITY

## 2023-08-28 RX ORDER — AMLODIPINE BESYLATE 5 MG/1
7.5 TABLET ORAL DAILY
Qty: 135 TABLET | Refills: 1 | Status: SHIPPED | OUTPATIENT
Start: 2023-08-28

## 2023-08-28 RX ORDER — CIPROFLOXACIN HYDROCHLORIDE 3.5 MG/ML
SOLUTION/ DROPS TOPICAL
COMMUNITY
Start: 2023-05-11 | End: 2023-08-28

## 2023-08-28 RX ORDER — ATORVASTATIN CALCIUM 20 MG/1
1 TABLET, FILM COATED ORAL DAILY
COMMUNITY
Start: 2023-07-01 | End: 2023-08-28 | Stop reason: SINTOL

## 2023-08-28 NOTE — PROGRESS NOTES
Chief Complaint  Juan Mohan presents to [facility] for Hypertension (6 month f/u /Worried about heart palpitations. ) and Palpitations (Not sure if it's from anxiety. )      Subjective     History of Present Illness  Palpitations: Patient complains of palpitations and rapid heart beat.  The symptoms are of moderate severity, occuring in the evening and lasting several seconds per episode. Cardiac risk factors include: hypertension, male gender, and obesity (BMI >= 30 kg/m2). Aggravating factors: stress/anxiety. Relieving factors: spontaneous, relax/ deep breath , stress relieve. Associated signs and symptoms: has complaint(s) of irregular heart beat and palpitations.     Lately having more sinus congestion- using fluticasone which helps but is making him throw up from drainage.  He recently has had some throwing up episodes .  Not feeling like it is related to eating other than red sauces.  Once vomits, feels better.  He is taking omeprazole daily     Juan presents today for follow up on Hypertension.    Current medication / treatment includes amlodipine and hydrochlorothiazide.    Reported as BP checks at home: home BP readings are in the 130-140's/'s range.    Cardiac symptoms palpitations.  The 10-year ASCVD risk score (Jenni DK, et al., 2019) is: 1.7%    Values used to calculate the score:      Age: 43 years      Sex: Male      Is Non- : No      Diabetic: No      Tobacco smoker: No      Systolic Blood Pressure: 141 mmHg      Is BP treated: Yes      HDL Cholesterol: 45 mg/dL      Total Cholesterol: 180 mg/dL       Assessment and Plan       Diagnoses and all orders for this visit:    1. Essential hypertension (Primary)  Comments:  elevated  today, consider adding BB based on holter results  Orders:  -     amLODIPine (NORVASC) 5 MG tablet; Take 1.5 tablets by mouth Daily.  Dispense: 135 tablet; Refill: 1  -     hydroCHLOROthiazide  "(HYDRODIURIL) 25 MG tablet; Take 1 tablet by mouth Daily.  Dispense: 90 tablet; Refill: 1  -     Comprehensive metabolic panel; Future  -     Magnesium; Future    2. Palpitations  Comments:  holter  consider BB for management  Orders:  -     CBC No Differential; Future  -     Comprehensive metabolic panel; Future  -     Magnesium; Future  -     TSH Rfx On Abnormal To Free T4; Future  -     Holter monitor - 24 hour; Future    3. Screening for cardiovascular condition    4. Nausea and vomiting, unspecified vomiting type  Comments:  will check labs  consider US in future if persists for evaluation of GB        Follow Up   No follow-ups on file.      New Medications Ordered This Visit   Medications    amLODIPine (NORVASC) 5 MG tablet     Sig: Take 1.5 tablets by mouth Daily.     Dispense:  135 tablet     Refill:  1    hydroCHLOROthiazide (HYDRODIURIL) 25 MG tablet     Sig: Take 1 tablet by mouth Daily.     Dispense:  90 tablet     Refill:  1       Medications Discontinued During This Encounter   Medication Reason    atorvastatin (LIPITOR) 20 MG tablet Side effects    ciprofloxacin (CILOXAN) 0.3 % ophthalmic solution *Therapy completed    hydroCHLOROthiazide (HYDRODIURIL) 25 MG tablet Reorder    amLODIPine (NORVASC) 5 MG tablet Reorder            Review of Systems    Objective     Vitals:    08/28/23 0751   BP: 141/96   BP Location: Left arm   Patient Position: Sitting   Pulse: 62   SpO2: 98%   Weight: 113 kg (249 lb)   Height: 182.9 cm (72.01\")     Body mass index is 33.76 kg/mý.     Physical Exam  Vitals reviewed.   Constitutional:       Appearance: Normal appearance. He is obese.   HENT:      Head: Normocephalic.   Eyes:      Pupils: Pupils are equal, round, and reactive to light.   Cardiovascular:      Rate and Rhythm: Normal rate and regular rhythm.      Heart sounds: No murmur heard.  Pulmonary:      Effort: Pulmonary effort is normal.      Breath sounds: Normal breath sounds.   Musculoskeletal:         General: " Normal range of motion.   Neurological:      Mental Status: He is alert.   Psychiatric:         Mood and Affect: Mood normal.         Behavior: Behavior normal.          Result Review                       Allergies   Allergen Reactions    Codeine Anaphylaxis    Keflex [Cephalexin] Anaphylaxis    Penicillins Anaphylaxis      Past Medical History:   Diagnosis Date    Cyst of prostate     NO PROBLEMS CURRENTLY    Diverticulosis     Hernia, inguinal     right    Hypertension      Current Outpatient Medications   Medication Sig Dispense Refill    amLODIPine (NORVASC) 5 MG tablet Take 1.5 tablets by mouth Daily. 135 tablet 1    hydroCHLOROthiazide (HYDRODIURIL) 25 MG tablet Take 1 tablet by mouth Daily. 90 tablet 1    omeprazole (priLOSEC) 40 MG capsule TAKE 1 CAPSULE (40 MG TOTAL) BY MOUTH IN THE MORNING       No current facility-administered medications for this visit.     Past Surgical History:   Procedure Laterality Date    APPENDECTOMY      CLOSED REDUCTION Left     left arm    COLONOSCOPY      INGUINAL HERNIA REPAIR Right 2/28/2022    Procedure: INGUINAL HERNIA REPAIR LAPAROSCOPIC WITH CloudSwayI ROBOT;  Surgeon: Abelino Davison MD;  [facility]  Service: Robotics - DaVinci;  Laterality: Right;      Health Maintenance Due   Topic Date Due    BMI FOLLOWUP  Never done    TDAP/TD VACCINES (1 - Tdap) Never done    COVID-19 Vaccine (3 - Moderna series) 11/17/2021      Immunization History   Administered Date(s) Administered    COVID-19 (MODERNA) 1st,2nd,3rd Dose Monovalent 08/12/2021, 09/22/2021    Flu Vaccine Quad PF >36MO 09/08/2016    Fluzone >6mos 10/13/2022    Fluzone Quad >6mos (Multi-dose) 09/08/2016         Part of this note may be an electronic transcription/translation of spoken language to printed   text using the Dragon Dictation System.      ANAI Mckeon

## 2023-08-29 DIAGNOSIS — R74.8 ELEVATED LIVER ENZYMES: ICD-10-CM

## 2023-08-29 DIAGNOSIS — R11.2 NAUSEA AND VOMITING, UNSPECIFIED VOMITING TYPE: Primary | ICD-10-CM

## 2023-08-30 DIAGNOSIS — I49.3 SYMPTOMATIC PVCS: Primary | ICD-10-CM

## 2023-08-30 DIAGNOSIS — R00.2 PALPITATIONS: ICD-10-CM

## 2023-08-30 RX ORDER — METOPROLOL SUCCINATE 25 MG/1
25 TABLET, EXTENDED RELEASE ORAL DAILY
Qty: 30 TABLET | Refills: 1 | Status: SHIPPED | OUTPATIENT
Start: 2023-08-30

## 2023-09-12 ENCOUNTER — HOSPITAL ENCOUNTER (OUTPATIENT)
Facility: HOSPITAL | Age: 43
Discharge: HOME | End: 2023-09-12
Admitting: NURSE PRACTITIONER
Payer: COMMERCIAL

## 2023-09-12 DIAGNOSIS — R74.8 ELEVATED LIVER ENZYMES: ICD-10-CM

## 2023-09-12 DIAGNOSIS — R93.2 ABNORMAL GALLBLADDER ULTRASOUND: Primary | ICD-10-CM

## 2023-09-12 DIAGNOSIS — R11.2 NAUSEA AND VOMITING, UNSPECIFIED VOMITING TYPE: ICD-10-CM

## 2023-09-12 PROBLEM — K21.9 ACID REFLUX: Status: ACTIVE | Noted: 2023-04-24

## 2023-09-12 PROBLEM — K76.0 FATTY LIVER: Status: ACTIVE | Noted: 2023-09-12

## 2023-09-12 PROBLEM — E78.00 HIGH CHOLESTEROL: Status: ACTIVE | Noted: 2023-04-24

## 2023-09-12 PROCEDURE — 76705 ECHO EXAM OF ABDOMEN: CPT

## 2023-09-13 ENCOUNTER — OFFICE VISIT (OUTPATIENT)
Dept: SURGERY | Facility: CLINIC | Age: 43
End: 2023-09-13
Payer: COMMERCIAL

## 2023-09-13 VITALS — WEIGHT: 249.4 LBS | HEIGHT: 72 IN | BODY MASS INDEX: 33.78 KG/M2

## 2023-09-13 DIAGNOSIS — K82.4 CHOLESTEROL POLYP OF GALLBLADDER: Primary | ICD-10-CM

## 2023-09-13 PROCEDURE — 99213 OFFICE O/P EST LOW 20 MIN: CPT | Performed by: STUDENT IN AN ORGANIZED HEALTH CARE EDUCATION/TRAINING PROGRAM

## 2023-09-13 NOTE — PROGRESS NOTES
Patient Name:  Juan Mohan  YOB: 1980  [id number]    Referring Provider: Josy Vinson APRN    Patient Care Team:  Josy Vinson APRN as PCP - General (Nurse Practitioner)  Abelino Davison MD as Consulting Physician (General Surgery)      Chief Complaint  Abdominal Pain (UPPER BACK PAIN AND RIGHT UPPER QUAD SPASMS)    Subjective     Juan Mohan is a 43 y.o. male who presents to [facility]    History of Present Illness  Patient presents for evaluation of an abnormal right upper quadrant ultrasound, which was significant for a 4 mm gallbladder polyp with no stones.  He states several months ago he had frequent nausea and vomiting and was found to have multiple ulcers on endoscopy.  He was subsequently started on omeprazole and his symptoms have somewhat improved.  He is also found to have some very mild transaminitis on recent labs and was advised to stop drinking alcohol.  He denies any postprandial pain, trouble with fatty foods, or any classic type biliary pain.  Abdominal Pain      History     Past Medical History:   Diagnosis Date    Cyst of prostate     NO PROBLEMS CURRENTLY    Diverticulosis     Hernia, inguinal     right    Hypertension        Past Surgical History:   Procedure Laterality Date    APPENDECTOMY      CLOSED REDUCTION Left     left arm    COLONOSCOPY      INGUINAL HERNIA REPAIR Right 2/28/2022    Procedure: INGUINAL HERNIA REPAIR LAPAROSCOPIC WITH DAVINCI ROBOT;  Surgeon: Abelino Davison MD;  Location: [facility];  Service: Robotics - DaVinci;  Laterality: Right;       Family History   Problem Relation Age of Onset    Hypertension Mother     Stroke Father     Hypertension Brother     Heart attack Paternal Grandmother     Heart disease Paternal Grandmother     Diabetes Paternal Grandmother     Cancer Paternal Grandfather         liver    Malig Hyperthermia Neg Hx        Social History     Tobacco Use    Smoking status:  "Former     Packs/day: 0.50     Years: 10.00     Pack years: 5.00     Types: Cigarettes     Quit date: 1/3/2020     Years since quitting: 3.6    Smokeless tobacco: Never   Vaping Use    Vaping Use: Never used   Substance Use Topics    Alcohol use: Yes     Comment: occ    Drug use: Never       Allergies   Allergen Reactions    Codeine Anaphylaxis    Keflex [Cephalexin] Anaphylaxis    Penicillins Anaphylaxis       Prior to Admission medications    Medication Sig Start Date End Date Taking? Authorizing Provider   amLODIPine (NORVASC) 5 MG tablet Take 1.5 tablets by mouth Daily. 8/28/23  Yes Josy Vinson APRN   hydroCHLOROthiazide (HYDRODIURIL) 25 MG tablet Take 1 tablet by mouth Daily. 8/28/23  Yes Josy Vinson APRN   metoprolol succinate XL (Toprol XL) 25 MG 24 hr tablet Take 1 tablet by mouth Daily. 8/30/23  Yes Josy Vinson APRN   omeprazole (priLOSEC) 40 MG capsule TAKE 1 CAPSULE (40 MG TOTAL) BY MOUTH IN THE MORNING   Yes Provider, MD Susan       Objective    Objective       Vital Signs:   Ht 182.9 cm (72.01\")   Wt 113 kg (249 lb 6.4 oz)   BMI 33.82 kg/m²       Physical Exam  Constitutional:       Appearance: Normal appearance.   HENT:      Head: Normocephalic and atraumatic.      Mouth/Throat:      Mouth: Mucous membranes are moist.      Pharynx: Oropharynx is clear.   Cardiovascular:      Rate and Rhythm: Normal rate and regular rhythm.   Pulmonary:      Effort: Pulmonary effort is normal. No respiratory distress.   Abdominal:      General: There is no distension.      Palpations: Abdomen is soft.      Tenderness: There is no abdominal tenderness.      Comments: No right upper quadrant tenderness, no Tejada sign   Musculoskeletal:         General: No swelling. Normal range of motion.      Cervical back: Normal range of motion and neck supple.   Skin:     General: Skin is warm and dry.   Neurological:      General: No focal deficit present.      Mental Status: He is alert and oriented to " person, place, and time.   Psychiatric:         Mood and Affect: Mood normal.         Behavior: Behavior normal.              Assessment / Plan      Diagnoses and all orders for this visit:    1. Cholesterol polyp of gallbladder (Primary)  -     US Abdomen Limited; Future  -     Comprehensive Metabolic Panel; Future    Right upper quadrant ultrasound reviewed, consistent with a gallbladder polyp at 4 mm likely cholesterol polyp.  He has no stones on imaging and no classic type biliary pain.  Given size of polyp and lack of symptoms, recommend surveillance with another right upper quadrant ultrasound in 6 months; can also repeat CMP at that time.  Did educate on warning signs of acute biliary pathology and when to present to the ED he voiced understanding.  Will follow-up results of ultrasound for any needed operative intervention.    Follow Up   Return in about 6 months (around 3/13/2024).      Patient was given instructions and counseling regarding his condition or for health maintenance advice. Please see specific information pulled into the AVS if appropriate.     Electronically signed by Sai Chan MD, 09/13/23, 1:39 PM EDT.

## 2023-09-23 DIAGNOSIS — I49.3 SYMPTOMATIC PVCS: ICD-10-CM

## 2023-09-23 DIAGNOSIS — R00.2 PALPITATIONS: ICD-10-CM

## 2023-09-25 RX ORDER — METOPROLOL SUCCINATE 25 MG/1
TABLET, EXTENDED RELEASE ORAL
Qty: 30 TABLET | Refills: 1 | OUTPATIENT
Start: 2023-09-25

## 2023-09-27 DIAGNOSIS — E78.5 DYSLIPIDEMIA: ICD-10-CM

## 2023-09-27 RX ORDER — ATORVASTATIN CALCIUM 20 MG/1
TABLET, FILM COATED ORAL
Qty: 90 TABLET | Refills: 1 | Status: SHIPPED | OUTPATIENT
Start: 2023-09-27

## 2023-10-09 ENCOUNTER — TELEMEDICINE (OUTPATIENT)
Dept: FAMILY MEDICINE CLINIC | Age: 43
End: 2023-10-09
Payer: COMMERCIAL

## 2023-10-09 ENCOUNTER — TELEPHONE (OUTPATIENT)
Dept: ORTHOPEDIC SURGERY | Facility: CLINIC | Age: 43
End: 2023-10-09
Payer: COMMERCIAL

## 2023-10-09 VITALS — DIASTOLIC BLOOD PRESSURE: 100 MMHG | SYSTOLIC BLOOD PRESSURE: 137 MMHG | HEART RATE: 67 BPM

## 2023-10-09 DIAGNOSIS — I49.3 SYMPTOMATIC PVCS: ICD-10-CM

## 2023-10-09 DIAGNOSIS — M77.12 LATERAL EPICONDYLITIS OF LEFT ELBOW: Primary | ICD-10-CM

## 2023-10-09 PROCEDURE — 99213 OFFICE O/P EST LOW 20 MIN: CPT | Performed by: NURSE PRACTITIONER

## 2023-10-09 NOTE — PROGRESS NOTES
Chief Complaint  Juan Mohan presents to [facility] for Palpitations (PVC follow up /*Micheline [phone] * )      Subjective     History of Present Illness  Mode of Visit: Video  You have chosen to receive care through a telehealth visit.  Do you consent to use a video/audio connection for your medical care today? YES   Identity has been verified with 2 identifiers - (name and date of birth).    The visit included audio and video interaction. Patient is currently located : home and provider located :  Office  No technical issues occurred during this visit.   Juan is following up on Palpiations - frequent PVCs noted on holter from 8/30/23  was started on metoprolol 25 mg daily     He reports that his symptoms have greatly improved and he does not wish to move forward with a referral or medication  change at this time due to improvement     Reports have been having problems with elbow pain and was referred to ortho previously but he needs one close to [place].    Numbness and tingling in his right hand / wrist   Assessment and Plan       Diagnoses and all orders for this visit:    1. Lateral epicondylitis of left elbow (Primary)  -     Ambulatory Referral to Orthopedic Surgery    2. Symptomatic PVCs  Comments:  continue current tretment at current dose  follow up in 3 months            Follow Up   Return in about 3 months (around 1/9/2024) for Recheck.      No orders of the defined types were placed in this encounter.      There are no discontinued medications.       Review of Systems    Objective     Vitals:    10/09/23 1346   BP: 137/100   BP Location: Other (Comment)   Pulse: 67     There is no height or weight on file to calculate BMI.         Physical Exam  Constitutional:       Appearance: Normal appearance.   HENT:      Head: Normocephalic.   Pulmonary:      Effort: Pulmonary effort is normal.   Musculoskeletal:      Cervical back: Normal range of motion.    Neurological:      Mental Status: He is alert and oriented to person, place, and time.   Psychiatric:         Mood and Affect: Mood normal.         Behavior: Behavior normal.            Result Review               Allergies   Allergen Reactions    Codeine Anaphylaxis    Keflex [Cephalexin] Anaphylaxis    Penicillins Anaphylaxis      Past Medical History:   Diagnosis Date    Cyst of prostate     NO PROBLEMS CURRENTLY    Diverticulosis     Hernia, inguinal     right    Hypertension      Current Outpatient Medications   Medication Sig Dispense Refill    amLODIPine (NORVASC) 5 MG tablet Take 1.5 tablets by mouth Daily. 135 tablet 1    atorvastatin (LIPITOR) 20 MG tablet TAKE 1 TABLET BY MOUTH EVERY DAY 90 tablet 1    hydroCHLOROthiazide (HYDRODIURIL) 25 MG tablet Take 1 tablet by mouth Daily. 90 tablet 1    metoprolol succinate XL (Toprol XL) 25 MG 24 hr tablet Take 1 tablet by mouth Daily. 30 tablet 1    omeprazole (priLOSEC) 40 MG capsule TAKE 1 CAPSULE (40 MG TOTAL) BY MOUTH IN THE MORNING       No current facility-administered medications for this visit.     Past Surgical History:   Procedure Laterality Date    APPENDECTOMY      CLOSED REDUCTION Left     left arm    COLONOSCOPY      INGUINAL HERNIA REPAIR Right 2/28/2022    Procedure: INGUINAL HERNIA REPAIR LAPAROSCOPIC WITH [employer];  Surgeon: Abelino Davison MD;  Location: [facility] OR;  Service: Robotics - DaVinci;  Laterality: Right;      Health Maintenance Due   Topic Date Due    BMI FOLLOWUP  Never done    Pneumococcal Vaccine 0-64 (1 - PCV) Never done    TDAP/TD VACCINES (1 - Tdap) Never done    INFLUENZA VACCINE  08/01/2023    COVID-19 Vaccine (3 - 2023-24 season) 09/01/2023      Immunization History   Administered Date(s) Administered    COVID-19 (MODERNA) 1st,2nd,3rd Dose Monovalent 08/12/2021, 09/22/2021    Flu Vaccine Quad PF >36MO 09/08/2016    Fluzone (or Fluarix & Flulaval for VFC) >6mos 10/13/2022    Fluzone Quad >6mos (Multi-dose)  09/08/2016         Part of this note may be an electronic transcription/translation of spoken language to printed   text using the Dragon Dictation System.      ANAI Mckeon

## 2023-10-24 DIAGNOSIS — I49.3 SYMPTOMATIC PVCS: ICD-10-CM

## 2023-10-24 DIAGNOSIS — R00.2 PALPITATIONS: ICD-10-CM

## 2023-10-24 RX ORDER — METOPROLOL SUCCINATE 25 MG/1
25 TABLET, EXTENDED RELEASE ORAL DAILY
Qty: 90 TABLET | Refills: 0 | Status: SHIPPED | OUTPATIENT
Start: 2023-10-24

## 2023-10-27 ENCOUNTER — OFFICE VISIT (OUTPATIENT)
Dept: ORTHOPEDIC SURGERY | Facility: CLINIC | Age: 43
End: 2023-10-27
Payer: COMMERCIAL

## 2023-10-27 VITALS
DIASTOLIC BLOOD PRESSURE: 99 MMHG | WEIGHT: 250 LBS | HEART RATE: 55 BPM | HEIGHT: 72 IN | SYSTOLIC BLOOD PRESSURE: 166 MMHG | OXYGEN SATURATION: 99 % | BODY MASS INDEX: 33.86 KG/M2

## 2023-10-27 DIAGNOSIS — M25.522 LEFT ELBOW PAIN: Primary | ICD-10-CM

## 2023-10-27 DIAGNOSIS — G56.22 CUBITAL TUNNEL SYNDROME ON LEFT: ICD-10-CM

## 2023-10-27 DIAGNOSIS — M75.22 BICEPS TENDINITIS OF LEFT UPPER EXTREMITY: ICD-10-CM

## 2023-10-27 DIAGNOSIS — M77.12 LATERAL EPICONDYLITIS OF LEFT ELBOW: ICD-10-CM

## 2023-10-27 RX ORDER — TRIAMCINOLONE ACETONIDE 40 MG/ML
40 INJECTION, SUSPENSION INTRA-ARTICULAR; INTRAMUSCULAR
Status: COMPLETED | OUTPATIENT
Start: 2023-10-27 | End: 2023-10-27

## 2023-10-27 RX ORDER — LIDOCAINE HYDROCHLORIDE 10 MG/ML
1 INJECTION, SOLUTION INFILTRATION; PERINEURAL
Status: COMPLETED | OUTPATIENT
Start: 2023-10-27 | End: 2023-10-27

## 2023-10-27 RX ADMIN — LIDOCAINE HYDROCHLORIDE 1 ML: 10 INJECTION, SOLUTION INFILTRATION; PERINEURAL at 08:27

## 2023-10-27 RX ADMIN — TRIAMCINOLONE ACETONIDE 40 MG: 40 INJECTION, SUSPENSION INTRA-ARTICULAR; INTRAMUSCULAR at 08:27

## 2023-10-27 NOTE — PROGRESS NOTES
"Chief Complaint  Pain and Initial Evaluation of the Left Elbow    Subjective          Juan Mohan presents to [facility] for   History of Present Illness    Juan presents today for evaluation of his left elbow.  He has a 1 year history of left elbow pain.  He localizes pain to the anterior antecubital fossa, lateral epicondyle, and radiating symptoms down the forearm.  He does get some numbness in the hand at times.  He has pain with activity including lifting his granddaughter.  He denies any direct trauma to the elbow in the past.  No prior elbow surgeries.  He reports his liver enzymes are elevated and he is unable to take anti-inflammatories at this time.    Allergies   Allergen Reactions    Codeine Anaphylaxis    Keflex [Cephalexin] Anaphylaxis    Penicillins Anaphylaxis        Social History     Socioeconomic History    Marital status:    Tobacco Use    Smoking status: Former     Packs/day: 0.50     Years: 10.00     Additional pack years: 0.00     Total pack years: 5.00     Types: Cigarettes     Quit date: 1/3/2020     Years since quitting: 3.8    Smokeless tobacco: Never   Vaping Use    Vaping Use: Never used   Substance and Sexual Activity    Alcohol use: Yes     Comment: occ    Drug use: Never    Sexual activity: Defer        I reviewed the patient's chief complaint, history of present illness, review of systems, past medical history, surgical history, family history, social history, medications, and allergy list.     REVIEW OF SYSTEMS    Constitutional: Denies fevers, chills, weight loss  Cardiovascular: Denies chest pain, shortness of breath  Skin: Denies rashes, acute skin changes  Neurologic: Denies headache, loss of consciousness  MSK: Left elbow pain      Objective   Vital Signs:   /99   Pulse 55   Ht 182.9 cm (72\")   Wt 113 kg (250 lb)   SpO2 99%   BMI 33.91 kg/m²     Body mass index is 33.91 kg/m².    Physical Exam    General: Alert. No acute " distress.   Left upper extremity: No wounds.  No deformity.  Elbow lacks 3 degrees of extension, similar to the contralateral side.  Flexion 130.  No crepitus.  90 degrees pronation and supination.  Elbow stable to varus and valgus stress.  Tenderness over the distal biceps tendon, but this is intact.  Tender over the lateral epicondyle.  Positive Tinel's over the cubital tunnel.  Negative Phalen's over the carpal tunnel.  Full active hand range of motion.  No muscle atrophy distally.  Palpable radial pulse.    Medium Joint Arthrocentesis: L elbow  Date/Time: 10/27/2023 8:27 AM  Consent given by: patient  Site marked: site marked  Timeout: Immediately prior to procedure a time out was called to verify the correct patient, procedure, equipment, support staff and site/side marked as required   Supporting Documentation  Indications: pain   Procedure Details  Location: elbow - L elbow  Needle size: 23 G  Medications administered: 1 mL lidocaine 1 %; 40 mg triamcinolone acetonide 40 MG/ML  Patient tolerance: patient tolerated the procedure well with no immediate complications        Imaging Results (Most Recent)       Procedure Component Value Units Date/Time    XR Elbow 2 View Left [[id number]] Resulted: 10/27/23 0914     Updated: 10/27/23 0915    Narrative:      Indications: Left elbow pain    Views: AP and lateral left elbow    Findings: No fractures noted.  No degenerative changes are seen.  There is   an enthesophyte at the lateral epicondyle.    Comparative Data: No comparative data available                     Assessment and Plan        XR Elbow 2 View Left    Result Date: 10/27/2023  Narrative: Indications: Left elbow pain Views: AP and lateral left elbow Findings: No fractures noted.  No degenerative changes are seen.  There is an enthesophyte at the lateral epicondyle. Comparative Data: No comparative data available      Diagnoses and all orders for this visit:    1. Left elbow pain (Primary)  -     XR Elbow 2  View Left    2. Lateral epicondylitis of left elbow  -     Medium Joint Arthrocentesis: L elbow    3. Biceps tendinitis of left upper extremity    4. Cubital tunnel syndrome on left        We reviewed his imaging and discussed nonoperative management.  We discussed the risk, benefits, indications, and alternatives to a left lateral epicondyle steroid injection.  He elected to proceed and tolerated the injection well.  Home exercises were provided today.  We will monitor his progress.  We may consider MRI, EMG, and formal physical therapy if needed moving forward.        Call or return if worsening symptoms.    Scribed for Seth Mims MD by Seth Mims MD  10/27/2023   08:21 EDT         Follow Up       6 weeks    Patient was given instructions and counseling regarding his condition or for health maintenance advice. Please see specific information pulled into the AVS if appropriate.       I have personally performed the services described in this document as scribed by the above individual and it is both accurate and complete.     Seth Mims MD  10/27/23  09:17 EDT

## 2023-12-08 ENCOUNTER — OFFICE VISIT (OUTPATIENT)
Dept: ORTHOPEDIC SURGERY | Facility: CLINIC | Age: 43
End: 2023-12-08
Payer: COMMERCIAL

## 2023-12-08 VITALS
OXYGEN SATURATION: 97 % | BODY MASS INDEX: 33.86 KG/M2 | HEART RATE: 75 BPM | SYSTOLIC BLOOD PRESSURE: 136 MMHG | DIASTOLIC BLOOD PRESSURE: 93 MMHG | HEIGHT: 72 IN | WEIGHT: 250 LBS

## 2023-12-08 DIAGNOSIS — M75.22 BICEPS TENDINITIS OF LEFT UPPER EXTREMITY: ICD-10-CM

## 2023-12-08 DIAGNOSIS — M77.12 LATERAL EPICONDYLITIS OF LEFT ELBOW: Primary | ICD-10-CM

## 2023-12-08 DIAGNOSIS — G56.22 CUBITAL TUNNEL SYNDROME ON LEFT: ICD-10-CM

## 2023-12-08 NOTE — PROGRESS NOTES
"Chief Complaint  Follow-up and Pain of the Left Elbow    Subjective          Juan Mohan presents to [facility] for   History of Present Illness    Juan returns for follow-up of his left elbow.  He has a long history of left elbow pain.  We performed a lateral epicondyle injection at the previous visit.  He has seen improvement in his symptoms.  He has not been doing any home exercises.  He denies any new symptoms.    Allergies   Allergen Reactions    Codeine Anaphylaxis    Keflex [Cephalexin] Anaphylaxis    Penicillins Anaphylaxis        Social History     Socioeconomic History    Marital status:    Tobacco Use    Smoking status: Former     Packs/day: 0.50     Years: 10.00     Additional pack years: 0.00     Total pack years: 5.00     Types: Cigarettes     Quit date: 1/3/2020     Years since quitting: 3.9    Smokeless tobacco: Never   Vaping Use    Vaping Use: Never used   Substance and Sexual Activity    Alcohol use: Yes     Comment: occ    Drug use: Never    Sexual activity: Defer        I reviewed the patient's chief complaint, history of present illness, review of systems, past medical history, surgical history, family history, social history, medications, and allergy list.     REVIEW OF SYSTEMS    Constitutional: Denies fevers, chills, weight loss  Cardiovascular: Denies chest pain, shortness of breath  Skin: Denies rashes, acute skin changes  Neurologic: Denies headache, loss of consciousness  MSK: Left elbow pain      Objective   Vital Signs:   /93   Pulse 75   Ht 182.9 cm (72\")   Wt 113 kg (250 lb)   SpO2 97%   BMI 33.91 kg/m²     Body mass index is 33.91 kg/m².    Physical Exam    General: Alert. No acute distress.   Left upper extremity: No wounds.  No deformity.  Elbow lacks 3 degrees of extension, similar to the contralateral side.  Flexion 130.  No crepitus.  90 degrees pronation and supination.  Elbow stable to varus and valgus stress.  Tenderness " over the distal biceps tendon, but this is intact.  Tender over the lateral epicondyle, but improved compared to the previous evaluation.  Positive Tinel's over the cubital tunnel.  Negative Phalen's over the carpal tunnel.  Full active hand range of motion.  No muscle atrophy distally.  Palpable radial pulse.     Procedures    Imaging Results (Most Recent)       None                     Assessment and Plan        No results found.     Diagnoses and all orders for this visit:    1. Lateral epicondylitis of left elbow (Primary)  -     Ambulatory Referral to Physical Therapy Evaluate and treat, Ortho; Stretching, ROM, Strengthening; Full weight bearing  -     Ambulatory Referral to Occupational Therapy    2. Biceps tendinitis of left upper extremity  -     Ambulatory Referral to Physical Therapy Evaluate and treat, Ortho; Stretching, ROM, Strengthening; Full weight bearing  -     Ambulatory Referral to Occupational Therapy    3. Cubital tunnel syndrome on left  -     Ambulatory Referral to Physical Therapy Evaluate and treat, Ortho; Stretching, ROM, Strengthening; Full weight bearing  -     Ambulatory Referral to Occupational Therapy        We discussed additional treatment options.  He will start formal physical therapy working on the left elbow.  He will follow-up with me in 8 weeks for reevaluation.  We may consider MRI or EMG if needed in the future.      Call or return if worsening symptoms.    Scribed for Seth Mims MD by Seth Mims MD  12/08/2023   10:22 EST         Follow Up       8 weeks    Patient was given instructions and counseling regarding his condition or for health maintenance advice. Please see specific information pulled into the AVS if appropriate.       I have personally performed the services described in this document as scribed by the above individual and it is both accurate and complete.     Seth Mims MD  12/08/23  10:22 EST

## 2024-01-13 DIAGNOSIS — I10 ESSENTIAL HYPERTENSION: Chronic | ICD-10-CM

## 2024-01-13 DIAGNOSIS — E78.5 DYSLIPIDEMIA: ICD-10-CM

## 2024-01-15 RX ORDER — ATORVASTATIN CALCIUM 20 MG/1
TABLET, FILM COATED ORAL
Qty: 90 TABLET | Refills: 1 | Status: SHIPPED | OUTPATIENT
Start: 2024-01-15

## 2024-01-15 RX ORDER — HYDROCHLOROTHIAZIDE 25 MG/1
25 TABLET ORAL DAILY
Qty: 90 TABLET | Refills: 1 | Status: SHIPPED | OUTPATIENT
Start: 2024-01-15

## 2024-01-28 DIAGNOSIS — R00.2 PALPITATIONS: ICD-10-CM

## 2024-01-28 DIAGNOSIS — I49.3 SYMPTOMATIC PVCS: ICD-10-CM

## 2024-01-29 RX ORDER — METOPROLOL SUCCINATE 25 MG/1
25 TABLET, EXTENDED RELEASE ORAL DAILY
Qty: 90 TABLET | Refills: 0 | Status: SHIPPED | OUTPATIENT
Start: 2024-01-29

## 2024-02-13 ENCOUNTER — LAB (OUTPATIENT)
Dept: LAB | Facility: HOSPITAL | Age: 44
End: 2024-02-13
Payer: COMMERCIAL

## 2024-02-13 ENCOUNTER — OFFICE VISIT (OUTPATIENT)
Dept: FAMILY MEDICINE CLINIC | Age: 44
End: 2024-02-13
Payer: COMMERCIAL

## 2024-02-13 VITALS
TEMPERATURE: 98.4 F | HEIGHT: 72 IN | DIASTOLIC BLOOD PRESSURE: 96 MMHG | WEIGHT: 250 LBS | OXYGEN SATURATION: 98 % | BODY MASS INDEX: 33.86 KG/M2 | SYSTOLIC BLOOD PRESSURE: 140 MMHG | HEART RATE: 73 BPM

## 2024-02-13 DIAGNOSIS — R00.2 PALPITATIONS: Primary | ICD-10-CM

## 2024-02-13 DIAGNOSIS — K62.5 BRIGHT RED BLOOD PER RECTUM: ICD-10-CM

## 2024-02-13 DIAGNOSIS — R00.2 PALPITATIONS: ICD-10-CM

## 2024-02-13 DIAGNOSIS — R13.10 DYSPHAGIA, UNSPECIFIED TYPE: ICD-10-CM

## 2024-02-13 DIAGNOSIS — R19.5 DARK STOOLS: ICD-10-CM

## 2024-02-13 DIAGNOSIS — I49.3 SYMPTOMATIC PVCS: ICD-10-CM

## 2024-02-13 LAB
ALBUMIN SERPL-MCNC: 4.9 G/DL (ref 3.5–5.2)
ALBUMIN/GLOB SERPL: 2.6 G/DL
ALP SERPL-CCNC: 62 U/L (ref 39–117)
ALT SERPL W P-5'-P-CCNC: 60 U/L (ref 1–41)
ANION GAP SERPL CALCULATED.3IONS-SCNC: 10 MMOL/L (ref 5–15)
AST SERPL-CCNC: 36 U/L (ref 1–40)
BASOPHILS # BLD AUTO: 0.05 10*3/MM3 (ref 0–0.2)
BASOPHILS NFR BLD AUTO: 1.1 % (ref 0–1.5)
BILIRUB SERPL-MCNC: 1.1 MG/DL (ref 0–1.2)
BUN SERPL-MCNC: 13 MG/DL (ref 6–20)
BUN/CREAT SERPL: 11.9 (ref 7–25)
CALCIUM SPEC-SCNC: 9.8 MG/DL (ref 8.6–10.5)
CHLORIDE SERPL-SCNC: 104 MMOL/L (ref 98–107)
CO2 SERPL-SCNC: 25 MMOL/L (ref 22–29)
CREAT SERPL-MCNC: 1.09 MG/DL (ref 0.76–1.27)
DEPRECATED RDW RBC AUTO: 38.8 FL (ref 37–54)
EGFRCR SERPLBLD CKD-EPI 2021: 86.9 ML/MIN/1.73
EOSINOPHIL # BLD AUTO: 0.1 10*3/MM3 (ref 0–0.4)
EOSINOPHIL NFR BLD AUTO: 2.2 % (ref 0.3–6.2)
ERYTHROCYTE [DISTWIDTH] IN BLOOD BY AUTOMATED COUNT: 11.9 % (ref 12.3–15.4)
GLOBULIN UR ELPH-MCNC: 1.9 GM/DL
GLUCOSE SERPL-MCNC: 92 MG/DL (ref 65–99)
HCT VFR BLD AUTO: 45.8 % (ref 37.5–51)
HGB BLD-MCNC: 16 G/DL (ref 13–17.7)
IMM GRANULOCYTES # BLD AUTO: 0 10*3/MM3 (ref 0–0.05)
IMM GRANULOCYTES NFR BLD AUTO: 0 % (ref 0–0.5)
LYMPHOCYTES # BLD AUTO: 1.69 10*3/MM3 (ref 0.7–3.1)
LYMPHOCYTES NFR BLD AUTO: 36.3 % (ref 19.6–45.3)
MAGNESIUM SERPL-MCNC: 2.3 MG/DL (ref 1.6–2.6)
MCH RBC QN AUTO: 30.8 PG (ref 26.6–33)
MCHC RBC AUTO-ENTMCNC: 34.9 G/DL (ref 31.5–35.7)
MCV RBC AUTO: 88.2 FL (ref 79–97)
MONOCYTES # BLD AUTO: 0.49 10*3/MM3 (ref 0.1–0.9)
MONOCYTES NFR BLD AUTO: 10.5 % (ref 5–12)
NEUTROPHILS NFR BLD AUTO: 2.32 10*3/MM3 (ref 1.7–7)
NEUTROPHILS NFR BLD AUTO: 49.9 % (ref 42.7–76)
PLATELET # BLD AUTO: 220 10*3/MM3 (ref 140–450)
PMV BLD AUTO: 10.2 FL (ref 6–12)
POTASSIUM SERPL-SCNC: 4 MMOL/L (ref 3.5–5.2)
PROT SERPL-MCNC: 6.8 G/DL (ref 6–8.5)
RBC # BLD AUTO: 5.19 10*6/MM3 (ref 4.14–5.8)
SODIUM SERPL-SCNC: 139 MMOL/L (ref 136–145)
TSH SERPL DL<=0.05 MIU/L-ACNC: 2.13 UIU/ML (ref 0.27–4.2)
WBC NRBC COR # BLD AUTO: 4.65 10*3/MM3 (ref 3.4–10.8)

## 2024-02-13 PROCEDURE — 83735 ASSAY OF MAGNESIUM: CPT

## 2024-02-13 PROCEDURE — 36415 COLL VENOUS BLD VENIPUNCTURE: CPT

## 2024-02-13 PROCEDURE — 99214 OFFICE O/P EST MOD 30 MIN: CPT | Performed by: NURSE PRACTITIONER

## 2024-02-13 PROCEDURE — 90471 IMMUNIZATION ADMIN: CPT | Performed by: NURSE PRACTITIONER

## 2024-02-13 PROCEDURE — 90686 IIV4 VACC NO PRSV 0.5 ML IM: CPT | Performed by: NURSE PRACTITIONER

## 2024-02-13 PROCEDURE — 80050 GENERAL HEALTH PANEL: CPT

## 2024-02-13 RX ORDER — METOPROLOL SUCCINATE 50 MG/1
50 TABLET, EXTENDED RELEASE ORAL DAILY
Qty: 90 TABLET | Refills: 1 | Status: SHIPPED | OUTPATIENT
Start: 2024-02-13

## 2024-03-11 ENCOUNTER — HOSPITAL ENCOUNTER (OUTPATIENT)
Dept: ULTRASOUND IMAGING | Facility: HOSPITAL | Age: 44
Discharge: HOME | End: 2024-03-11
Admitting: STUDENT IN AN ORGANIZED HEALTH CARE EDUCATION/TRAINING PROGRAM
Payer: COMMERCIAL

## 2024-03-11 DIAGNOSIS — K82.4 CHOLESTEROL POLYP OF GALLBLADDER: ICD-10-CM

## 2024-03-11 PROCEDURE — 76705 ECHO EXAM OF ABDOMEN: CPT

## 2024-03-13 ENCOUNTER — OFFICE VISIT (OUTPATIENT)
Dept: SURGERY | Facility: CLINIC | Age: 44
End: 2024-03-13
Payer: COMMERCIAL

## 2024-03-13 VITALS
WEIGHT: 255 LBS | HEIGHT: 72 IN | HEART RATE: 60 BPM | SYSTOLIC BLOOD PRESSURE: 138 MMHG | DIASTOLIC BLOOD PRESSURE: 92 MMHG | BODY MASS INDEX: 34.54 KG/M2 | RESPIRATION RATE: 16 BRPM

## 2024-03-13 DIAGNOSIS — K82.4 GALLBLADDER POLYP: ICD-10-CM

## 2024-03-13 DIAGNOSIS — K92.1 MELENA: Primary | ICD-10-CM

## 2024-03-13 PROCEDURE — 99213 OFFICE O/P EST LOW 20 MIN: CPT | Performed by: STUDENT IN AN ORGANIZED HEALTH CARE EDUCATION/TRAINING PROGRAM

## 2024-03-13 RX ORDER — NAPROXEN 500 MG/1
500 TABLET ORAL
COMMUNITY
Start: 2024-02-15

## 2024-03-13 NOTE — PROGRESS NOTES
"Chief Complaint  Dark stools (Bright red blood per rectum)    Subjective    Subjective     Juan Mohan is a 44 y.o. male who presents to [facility]    History of Present Illness  44-year-old male who presents today for routine 6-month follow-up for an asymptomatic gallbladder polyp without cholelithiasis.  He underwent repeat ultrasound earlier this week, but the results/report of this have not been read yet.  He continues to deny any postprandial abdominal pain.  He did have an episode of right lower quadrant abdominal pain recently and presented to the ED in [place] without any significant findings.  Since last evaluated in the office, patient has had intermittent bloody bowel movements.  He has a history of this several years ago and underwent colonoscopy at that time which was negative for any findings.  He denies any family history of colorectal cancer or inflammatory bowel disease.  He denies any recent unexplained weight loss/weight gain, nausea, vomiting, fevers, chills, or night sweats.      Personal History     Social History     Tobacco Use    Smoking status: Former     Current packs/day: 0.00     Average packs/day: 0.5 packs/day for 10.0 years (5.0 ttl pk-yrs)     Types: Cigarettes     Start date: 1/3/2010     Quit date: 1/3/2020     Years since quittin.1    Smokeless tobacco: Never   Vaping Use    Vaping status: Never Used   Substance Use Topics    Alcohol use: Yes     Comment: occ    Drug use: Never     Allergies   Allergen Reactions    Codeine Anaphylaxis    Keflex [Cephalexin] Anaphylaxis    Penicillins Anaphylaxis       Objective    Objective       Vital Signs:   /92 (BP Location: Left arm, Patient Position: Sitting, Cuff Size: Adult)   Pulse 60   Resp 16   Ht 182.9 cm (72\")   Wt 116 kg (255 lb)   BMI 34.58 kg/m²       Physical Exam  Constitutional:       Appearance: Normal appearance.   HENT:      Head: Normocephalic and atraumatic.      " Mouth/Throat:      Mouth: Mucous membranes are moist.      Pharynx: Oropharynx is clear.   Cardiovascular:      Rate and Rhythm: Normal rate and regular rhythm.   Pulmonary:      Effort: Pulmonary effort is normal. No respiratory distress.   Abdominal:      General: There is no distension.      Palpations: Abdomen is soft.      Tenderness: There is no abdominal tenderness.   Musculoskeletal:         General: No swelling. Normal range of motion.      Cervical back: Normal range of motion and neck supple.   Skin:     General: Skin is warm and dry.   Neurological:      General: No focal deficit present.      Mental Status: He is alert and oriented to person, place, and time.   Psychiatric:         Mood and Affect: Mood normal.         Behavior: Behavior normal.                  Assessment / Plan      Diagnoses and all orders for this visit:    1. Melena (Primary)  -     Case Request; Standing  -     Case Request    2. Gallbladder polyp    Other orders  -     Follow Anesthesia Guidelines / Protocol; Standing  -     Follow Anesthesia Guidelines / Protocol; Future  -     Verify NPO; Standing  -     Verify Bowel Prep Was Successful; Standing  -     Obtain Informed Consent; Standing    44-year-old male with bloody bowel movements.  He is at average risk for colorectal cancer and previously underwent a colonoscopy he estimates around 10 to 15 years ago for a similar complaint and this was normal.  Recommend repeat endoscopic evaluation with colonoscopy possible biopsy and any other indicated procedure.  Prep discussed with patient and he voiced understanding.  Risks/benefits/alternatives of the procedure were explained to the patient and he was agreeable to proceed.    Will also await results of 6-month follow-up gallbladder ultrasound to assess size of gallbladder polyp and determine if any surgical intervention is indicated.    Follow Up   Return for Post-op.      Patient was given instructions and counseling regarding his  condition or for health maintenance advice. Please see specific information pulled into the AVS if appropriate.     Electronically signed by Sai Chan MD, 03/13/24, 12:25 PM EDT.

## 2024-03-15 DIAGNOSIS — K82.4 GALLBLADDER POLYP: Primary | ICD-10-CM

## 2024-03-19 ENCOUNTER — HOSPITAL ENCOUNTER (OUTPATIENT)
Facility: HOSPITAL | Age: 44
Setting detail: OBSERVATION
Discharge: HOME | End: 2024-03-21
Attending: EMERGENCY MEDICINE | Admitting: STUDENT IN AN ORGANIZED HEALTH CARE EDUCATION/TRAINING PROGRAM
Payer: COMMERCIAL

## 2024-03-19 DIAGNOSIS — M54.50 ACUTE MIDLINE LOW BACK PAIN WITHOUT SCIATICA: Primary | ICD-10-CM

## 2024-03-19 DIAGNOSIS — R52 UNCONTROLLED PAIN: ICD-10-CM

## 2024-03-19 LAB
ANION GAP SERPL CALCULATED.3IONS-SCNC: 8 MMOL/L (ref 5–15)
BASOPHILS # BLD AUTO: 0.06 10*3/MM3 (ref 0–0.2)
BASOPHILS NFR BLD AUTO: 1 % (ref 0–1.5)
BUN SERPL-MCNC: 19 MG/DL (ref 6–20)
BUN/CREAT SERPL: 16.7 (ref 7–25)
CALCIUM SPEC-SCNC: 9.2 MG/DL (ref 8.6–10.5)
CHLORIDE SERPL-SCNC: 106 MMOL/L (ref 98–107)
CO2 SERPL-SCNC: 27 MMOL/L (ref 22–29)
CREAT SERPL-MCNC: 1.14 MG/DL (ref 0.76–1.27)
DEPRECATED RDW RBC AUTO: 39.9 FL (ref 37–54)
EGFRCR SERPLBLD CKD-EPI 2021: 82.3 ML/MIN/1.73
EOSINOPHIL # BLD AUTO: 0.13 10*3/MM3 (ref 0–0.4)
EOSINOPHIL NFR BLD AUTO: 2.2 % (ref 0.3–6.2)
ERYTHROCYTE [DISTWIDTH] IN BLOOD BY AUTOMATED COUNT: 12.2 % (ref 12.3–15.4)
GLUCOSE SERPL-MCNC: 169 MG/DL (ref 65–99)
HCT VFR BLD AUTO: 42.6 % (ref 37.5–51)
HGB BLD-MCNC: 14.6 G/DL (ref 13–17.7)
IMM GRANULOCYTES # BLD AUTO: 0.01 10*3/MM3 (ref 0–0.05)
IMM GRANULOCYTES NFR BLD AUTO: 0.2 % (ref 0–0.5)
LYMPHOCYTES # BLD AUTO: 2.12 10*3/MM3 (ref 0.7–3.1)
LYMPHOCYTES NFR BLD AUTO: 35.8 % (ref 19.6–45.3)
MCH RBC QN AUTO: 30.9 PG (ref 26.6–33)
MCHC RBC AUTO-ENTMCNC: 34.3 G/DL (ref 31.5–35.7)
MCV RBC AUTO: 90.3 FL (ref 79–97)
MONOCYTES # BLD AUTO: 0.65 10*3/MM3 (ref 0.1–0.9)
MONOCYTES NFR BLD AUTO: 11 % (ref 5–12)
NEUTROPHILS NFR BLD AUTO: 2.95 10*3/MM3 (ref 1.7–7)
NEUTROPHILS NFR BLD AUTO: 49.8 % (ref 42.7–76)
NRBC BLD AUTO-RTO: 0 /100 WBC (ref 0–0.2)
PLATELET # BLD AUTO: 208 10*3/MM3 (ref 140–450)
PMV BLD AUTO: 10.3 FL (ref 6–12)
POTASSIUM SERPL-SCNC: 3.6 MMOL/L (ref 3.5–5.2)
RBC # BLD AUTO: 4.72 10*6/MM3 (ref 4.14–5.8)
SODIUM SERPL-SCNC: 141 MMOL/L (ref 136–145)
WBC NRBC COR # BLD AUTO: 5.92 10*3/MM3 (ref 3.4–10.8)

## 2024-03-19 PROCEDURE — 80048 BASIC METABOLIC PNL TOTAL CA: CPT | Performed by: EMERGENCY MEDICINE

## 2024-03-19 PROCEDURE — 96374 THER/PROPH/DIAG INJ IV PUSH: CPT

## 2024-03-19 PROCEDURE — 85025 COMPLETE CBC W/AUTO DIFF WBC: CPT | Performed by: EMERGENCY MEDICINE

## 2024-03-19 PROCEDURE — G0378 HOSPITAL OBSERVATION PER HR: HCPCS

## 2024-03-19 PROCEDURE — 25010000002 ONDANSETRON PER 1 MG: Performed by: EMERGENCY MEDICINE

## 2024-03-19 PROCEDURE — 96375 TX/PRO/DX INJ NEW DRUG ADDON: CPT

## 2024-03-19 PROCEDURE — 99285 EMERGENCY DEPT VISIT HI MDM: CPT

## 2024-03-19 PROCEDURE — 25010000002 HYDROMORPHONE 1 MG/ML SOLUTION: Performed by: EMERGENCY MEDICINE

## 2024-03-19 RX ORDER — SODIUM CHLORIDE 0.9 % (FLUSH) 0.9 %
10 SYRINGE (ML) INJECTION AS NEEDED
Status: DISCONTINUED | OUTPATIENT
Start: 2024-03-19 | End: 2024-03-21 | Stop reason: HOSPADM

## 2024-03-19 RX ORDER — ONDANSETRON 2 MG/ML
4 INJECTION INTRAMUSCULAR; INTRAVENOUS ONCE
Status: COMPLETED | OUTPATIENT
Start: 2024-03-19 | End: 2024-03-19

## 2024-03-19 RX ADMIN — ONDANSETRON 4 MG: 2 INJECTION INTRAMUSCULAR; INTRAVENOUS at 22:44

## 2024-03-19 RX ADMIN — HYDROMORPHONE HYDROCHLORIDE 1 MG: 1 INJECTION, SOLUTION INTRAMUSCULAR; INTRAVENOUS; SUBCUTANEOUS at 22:45

## 2024-03-20 ENCOUNTER — APPOINTMENT (OUTPATIENT)
Dept: MRI IMAGING | Facility: HOSPITAL | Age: 44
End: 2024-03-20
Payer: COMMERCIAL

## 2024-03-20 PROBLEM — R73.9 HYPERGLYCEMIA: Status: ACTIVE | Noted: 2024-03-20

## 2024-03-20 LAB
ANION GAP SERPL CALCULATED.3IONS-SCNC: 15.5 MMOL/L (ref 5–15)
BUN SERPL-MCNC: 15 MG/DL (ref 6–20)
BUN/CREAT SERPL: 15.6 (ref 7–25)
CALCIUM SPEC-SCNC: 8.8 MG/DL (ref 8.6–10.5)
CHLORIDE SERPL-SCNC: 107 MMOL/L (ref 98–107)
CO2 SERPL-SCNC: 16.5 MMOL/L (ref 22–29)
CREAT SERPL-MCNC: 0.96 MG/DL (ref 0.76–1.27)
DEPRECATED RDW RBC AUTO: 39.7 FL (ref 37–54)
EGFRCR SERPLBLD CKD-EPI 2021: 101.2 ML/MIN/1.73
ERYTHROCYTE [DISTWIDTH] IN BLOOD BY AUTOMATED COUNT: 12.3 % (ref 12.3–15.4)
GLUCOSE SERPL-MCNC: 92 MG/DL (ref 65–99)
HBA1C MFR BLD: 5.1 % (ref 4.8–5.6)
HCT VFR BLD AUTO: 43.2 % (ref 37.5–51)
HGB BLD-MCNC: 15.1 G/DL (ref 13–17.7)
MAGNESIUM SERPL-MCNC: 3.2 MG/DL (ref 1.6–2.6)
MCH RBC QN AUTO: 31.2 PG (ref 26.6–33)
MCHC RBC AUTO-ENTMCNC: 35 G/DL (ref 31.5–35.7)
MCV RBC AUTO: 89.3 FL (ref 79–97)
PHOSPHATE SERPL-MCNC: 3.4 MG/DL (ref 2.5–4.5)
PLATELET # BLD AUTO: 204 10*3/MM3 (ref 140–450)
PMV BLD AUTO: 10.5 FL (ref 6–12)
POTASSIUM SERPL-SCNC: 4 MMOL/L (ref 3.5–5.2)
RBC # BLD AUTO: 4.84 10*6/MM3 (ref 4.14–5.8)
SODIUM SERPL-SCNC: 139 MMOL/L (ref 136–145)
WBC NRBC COR # BLD AUTO: 5.22 10*3/MM3 (ref 3.4–10.8)

## 2024-03-20 PROCEDURE — G0378 HOSPITAL OBSERVATION PER HR: HCPCS

## 2024-03-20 PROCEDURE — 84100 ASSAY OF PHOSPHORUS: CPT | Performed by: STUDENT IN AN ORGANIZED HEALTH CARE EDUCATION/TRAINING PROGRAM

## 2024-03-20 PROCEDURE — 83735 ASSAY OF MAGNESIUM: CPT | Performed by: STUDENT IN AN ORGANIZED HEALTH CARE EDUCATION/TRAINING PROGRAM

## 2024-03-20 PROCEDURE — 80048 BASIC METABOLIC PNL TOTAL CA: CPT | Performed by: STUDENT IN AN ORGANIZED HEALTH CARE EDUCATION/TRAINING PROGRAM

## 2024-03-20 PROCEDURE — 72158 MRI LUMBAR SPINE W/O & W/DYE: CPT

## 2024-03-20 PROCEDURE — A9577 INJ MULTIHANCE: HCPCS | Performed by: INTERNAL MEDICINE

## 2024-03-20 PROCEDURE — 97162 PT EVAL MOD COMPLEX 30 MIN: CPT

## 2024-03-20 PROCEDURE — 63710000001 PREDNISONE PER 1 MG: Performed by: STUDENT IN AN ORGANIZED HEALTH CARE EDUCATION/TRAINING PROGRAM

## 2024-03-20 PROCEDURE — 97530 THERAPEUTIC ACTIVITIES: CPT

## 2024-03-20 PROCEDURE — 85027 COMPLETE CBC AUTOMATED: CPT | Performed by: STUDENT IN AN ORGANIZED HEALTH CARE EDUCATION/TRAINING PROGRAM

## 2024-03-20 PROCEDURE — 83036 HEMOGLOBIN GLYCOSYLATED A1C: CPT | Performed by: STUDENT IN AN ORGANIZED HEALTH CARE EDUCATION/TRAINING PROGRAM

## 2024-03-20 PROCEDURE — 0 GADOBENATE DIMEGLUMINE 529 MG/ML SOLUTION: Performed by: INTERNAL MEDICINE

## 2024-03-20 RX ORDER — POLYETHYLENE GLYCOL 3350 17 G/17G
17 POWDER, FOR SOLUTION ORAL DAILY PRN
Status: DISCONTINUED | OUTPATIENT
Start: 2024-03-20 | End: 2024-03-21 | Stop reason: HOSPADM

## 2024-03-20 RX ORDER — METOPROLOL SUCCINATE 50 MG/1
50 TABLET, EXTENDED RELEASE ORAL DAILY
Status: DISCONTINUED | OUTPATIENT
Start: 2024-03-20 | End: 2024-03-21 | Stop reason: HOSPADM

## 2024-03-20 RX ORDER — BISACODYL 5 MG/1
5 TABLET, DELAYED RELEASE ORAL DAILY PRN
Status: DISCONTINUED | OUTPATIENT
Start: 2024-03-20 | End: 2024-03-21 | Stop reason: HOSPADM

## 2024-03-20 RX ORDER — LIDOCAINE 4 G/G
1 PATCH TOPICAL
Status: DISCONTINUED | OUTPATIENT
Start: 2024-03-20 | End: 2024-03-21 | Stop reason: HOSPADM

## 2024-03-20 RX ORDER — AMOXICILLIN 250 MG
2 CAPSULE ORAL 2 TIMES DAILY
Status: DISCONTINUED | OUTPATIENT
Start: 2024-03-20 | End: 2024-03-21 | Stop reason: HOSPADM

## 2024-03-20 RX ORDER — SODIUM CHLORIDE 0.9 % (FLUSH) 0.9 %
10 SYRINGE (ML) INJECTION EVERY 12 HOURS SCHEDULED
Status: DISCONTINUED | OUTPATIENT
Start: 2024-03-20 | End: 2024-03-21 | Stop reason: HOSPADM

## 2024-03-20 RX ORDER — PANTOPRAZOLE SODIUM 40 MG/1
40 TABLET, DELAYED RELEASE ORAL
Status: DISCONTINUED | OUTPATIENT
Start: 2024-03-20 | End: 2024-03-21 | Stop reason: HOSPADM

## 2024-03-20 RX ORDER — SODIUM CHLORIDE 9 MG/ML
40 INJECTION, SOLUTION INTRAVENOUS AS NEEDED
Status: DISCONTINUED | OUTPATIENT
Start: 2024-03-20 | End: 2024-03-21 | Stop reason: HOSPADM

## 2024-03-20 RX ORDER — SODIUM CHLORIDE 0.9 % (FLUSH) 0.9 %
10 SYRINGE (ML) INJECTION AS NEEDED
Status: DISCONTINUED | OUTPATIENT
Start: 2024-03-20 | End: 2024-03-21 | Stop reason: HOSPADM

## 2024-03-20 RX ORDER — HYDROCHLOROTHIAZIDE 25 MG/1
25 TABLET ORAL DAILY
Status: DISCONTINUED | OUTPATIENT
Start: 2024-03-20 | End: 2024-03-21 | Stop reason: HOSPADM

## 2024-03-20 RX ORDER — OXYCODONE AND ACETAMINOPHEN 7.5; 325 MG/1; MG/1
1 TABLET ORAL EVERY 4 HOURS PRN
Status: DISCONTINUED | OUTPATIENT
Start: 2024-03-20 | End: 2024-03-21 | Stop reason: HOSPADM

## 2024-03-20 RX ORDER — PREDNISONE 20 MG/1
40 TABLET ORAL
Status: DISCONTINUED | OUTPATIENT
Start: 2024-03-20 | End: 2024-03-21 | Stop reason: HOSPADM

## 2024-03-20 RX ORDER — BISACODYL 10 MG
10 SUPPOSITORY, RECTAL RECTAL DAILY PRN
Status: DISCONTINUED | OUTPATIENT
Start: 2024-03-20 | End: 2024-03-21 | Stop reason: HOSPADM

## 2024-03-20 RX ADMIN — METOPROLOL SUCCINATE 50 MG: 50 TABLET, EXTENDED RELEASE ORAL at 08:34

## 2024-03-20 RX ADMIN — HYDROCHLOROTHIAZIDE 25 MG: 25 TABLET ORAL at 08:35

## 2024-03-20 RX ADMIN — AMLODIPINE BESYLATE 7.5 MG: 2.5 TABLET ORAL at 08:34

## 2024-03-20 RX ADMIN — OXYCODONE AND ACETAMINOPHEN 1 TABLET: 7.5; 325 TABLET ORAL at 11:48

## 2024-03-20 RX ADMIN — Medication 10 ML: at 08:35

## 2024-03-20 RX ADMIN — DOCUSATE SODIUM 50MG AND SENNOSIDES 8.6MG 2 TABLET: 8.6; 5 TABLET, FILM COATED ORAL at 20:16

## 2024-03-20 RX ADMIN — Medication 10 ML: at 01:00

## 2024-03-20 RX ADMIN — GADOBENATE DIMEGLUMINE 20 ML: 529 INJECTION, SOLUTION INTRAVENOUS at 16:38

## 2024-03-20 RX ADMIN — OXYCODONE AND ACETAMINOPHEN 1 TABLET: 7.5; 325 TABLET ORAL at 06:25

## 2024-03-20 RX ADMIN — PREDNISONE 40 MG: 20 TABLET ORAL at 08:35

## 2024-03-20 RX ADMIN — DOCUSATE SODIUM 50MG AND SENNOSIDES 8.6MG 2 TABLET: 8.6; 5 TABLET, FILM COATED ORAL at 00:58

## 2024-03-20 RX ADMIN — PANTOPRAZOLE SODIUM 40 MG: 40 TABLET, DELAYED RELEASE ORAL at 06:25

## 2024-03-20 RX ADMIN — OXYCODONE AND ACETAMINOPHEN 1 TABLET: 7.5; 325 TABLET ORAL at 20:16

## 2024-03-20 RX ADMIN — DOCUSATE SODIUM 50MG AND SENNOSIDES 8.6MG 2 TABLET: 8.6; 5 TABLET, FILM COATED ORAL at 08:34

## 2024-03-20 RX ADMIN — Medication 10 ML: at 20:17

## 2024-03-20 RX ADMIN — OXYCODONE AND ACETAMINOPHEN 1 TABLET: 7.5; 325 TABLET ORAL at 00:58

## 2024-03-20 RX ADMIN — LIDOCAINE 1 PATCH: 4 PATCH TOPICAL at 08:34

## 2024-03-20 NOTE — PLAN OF CARE
Problem: Adult Inpatient Plan of Care  Goal: Plan of Care Review  Outcome: Ongoing, Progressing     Problem: Adult Inpatient Plan of Care  Goal: Absence of Hospital-Acquired Illness or Injury  Intervention: Prevent and Manage VTE (Venous Thromboembolism) Risk  Recent Flowsheet Documentation  Taken 3/20/2024 0000 by Abi Jansen RN  Activity Management: bedrest  VTE Prevention/Management:   bilateral   sequential compression devices on     Problem: Adult Inpatient Plan of Care  Goal: Absence of Hospital-Acquired Illness or Injury  Intervention: Prevent Infection  Recent Flowsheet Documentation  Taken 3/20/2024 0000 by Abi Jansen RN  Infection Prevention:   environmental surveillance performed   equipment surfaces disinfected   hand hygiene promoted   personal protective equipment utilized   rest/sleep promoted   single patient room provided     Problem: Adult Inpatient Plan of Care  Goal: Optimal Comfort and Wellbeing  Outcome: Ongoing, Progressing  Intervention: Monitor Pain and Promote Comfort  Recent Flowsheet Documentation  Taken 3/20/2024 0000 by Abi Jansen RN  Pain Management Interventions:   care clustered   see MAR   quiet environment facilitated   pillow support provided   position adjusted   relaxation techniques promoted  Intervention: Provide Person-Centered Care  Recent Flowsheet Documentation  Taken 3/20/2024 0000 by Abi Jansen RN  Trust Relationship/Rapport:   care explained   reassurance provided   questions answered   empathic listening provided     Problem: Adult Inpatient Plan of Care  Goal: Optimal Comfort and Wellbeing  Intervention: Provide Person-Centered Care  Recent Flowsheet Documentation  Taken 3/20/2024 0000 by Abi Jansen RN  Trust Relationship/Rapport:   care explained   reassurance provided   questions answered   empathic listening provided     Problem: Adult Inpatient Plan of Care  Goal: Readiness for Transition of Care  Outcome: Ongoing,  Progressing  Intervention: Mutually Develop Transition Plan  Recent Flowsheet Documentation  Taken 3/20/2024 0022 by Abi Jansen RN  Equipment Currently Used at Home: none  Taken 3/20/2024 0021 by Abi Jansen RN  Transportation Anticipated: family or friend will provide  Patient/Family Anticipated Services at Transition:   rehabilitation services   home health care   none  Patient/Family Anticipates Transition to:   home with family   inpatient rehabilitation facility  Taken 3/20/2024 0015 by Abi Jansen RN  Transportation Anticipated: family or friend will provide  Patient/Family Anticipates Transition to: home with family     Problem: Adult Inpatient Plan of Care  Goal: Readiness for Transition of Care  Intervention: Mutually Develop Transition Plan  Recent Flowsheet Documentation  Taken 3/20/2024 0022 by Abi Jansen RN  Equipment Currently Used at Home: none  Taken 3/20/2024 0021 by Abi Jansen RN  Transportation Anticipated: family or friend will provide  Patient/Family Anticipated Services at Transition:   rehabilitation services   home health care   none  Patient/Family Anticipates Transition to:   home with family   inpatient rehabilitation facility  Taken 3/20/2024 0015 by Abi Jansen RN  Transportation Anticipated: family or friend will provide  Patient/Family Anticipates Transition to: home with family     Problem: Pain Chronic (Persistent) (Comorbidity Management)  Goal: Acceptable Pain Control and Functional Ability  Outcome: Ongoing, Progressing  Intervention: Manage Persistent Pain  Recent Flowsheet Documentation  Taken 3/20/2024 0000 by Abi Jansen RN  Sleep/Rest Enhancement: relaxation techniques promoted  Intervention: Develop Pain Management Plan  Recent Flowsheet Documentation  Taken 3/20/2024 0000 by Abi Jansen RN  Pain Management Interventions:   care clustered   see MAR   quiet environment facilitated   pillow support provided   position  adjusted   relaxation techniques promoted  Intervention: Optimize Psychosocial Wellbeing  Recent Flowsheet Documentation  Taken 3/20/2024 0000 by Abi Jansen RN  Supportive Measures:   active listening utilized   problem-solving facilitated   relaxation techniques promoted  Diversional Activities: (quiet environment facilitated)   other (see comments)   Fyber  Family/Support System Care:   support provided   involvement promoted     Problem: Pain Chronic (Persistent) (Comorbidity Management)  Goal: Acceptable Pain Control and Functional Ability  Intervention: Manage Persistent Pain  Recent Flowsheet Documentation  Taken 3/20/2024 0000 by Abi Jansen RN  Sleep/Rest Enhancement: relaxation techniques promoted     Problem: Pain Chronic (Persistent) (Comorbidity Management)  Goal: Acceptable Pain Control and Functional Ability  Intervention: Develop Pain Management Plan  Recent Flowsheet Documentation  Taken 3/20/2024 0000 by Abi Jansen RN  Pain Management Interventions:   care clustered   see MAR   quiet environment facilitated   pillow support provided   position adjusted   relaxation techniques promoted     Problem: Pain Chronic (Persistent) (Comorbidity Management)  Goal: Acceptable Pain Control and Functional Ability  Intervention: Optimize Psychosocial Wellbeing  Recent Flowsheet Documentation  Taken 3/20/2024 0000 by Abi Jansen RN  Supportive Measures:   active listening utilized   problem-solving facilitated   relaxation techniques promoted  Diversional Activities: (quiet environment facilitated)   other (see comments)   Fyber  Family/Support System Care:   support provided   involvement promoted   Goal Outcome Evaluation:

## 2024-03-20 NOTE — PLAN OF CARE
Goal Outcome Evaluation:      Patient VSS; able to transfer self from bed to stretcher; able to use urinal as needed. Denies worsening of sx. PRN medications as requested. MRI read pending at this time.

## 2024-03-20 NOTE — ED PROVIDER NOTES
EMERGENCY DEPARTMENT ENCOUNTER    Room Number:  19/19  PCP: Josy Vinson APRN    HPI:  Chief Complaint: Low back pain  A complete HPI/ROS/PMH/PSH/SH/FH are unobtainable due to: None  Context: Juan Mohan is a 44 y.o. male who presents to the ED c/o acute low back pain.  He states that the pain starts in the low back and radiates slightly down the gluteus bilaterally.  It is worse on the left.  No preceding trauma or injury that he can think of.  It is a sharp and stabbing pain that is constant.  He went to [facility] earlier today and had plain films that showed some disc space narrowing in the lower lumbar region.  However, he has failed outpatient medication regimen including oral opioids.  He is here today due to poorly controlled pain.    Patient denies any recent trauma to back. No fevers. No history of illicit IV drug use. No chronic steroid use. No anticoagulants. No history of malignancy. No saddle anesthesia or bowel or bladder changes.        PAST MEDICAL HISTORY  Active Ambulatory Problems     Diagnosis Date Noted    Abnormal EKG 02/27/2021    Essential hypertension 02/27/2021    Dyslipidemia 02/27/2021    Constipation 07/08/2021    Prostate irregularity 07/15/2021    Spleen enlargement 07/15/2021    Renal cyst 07/15/2021    Inguinal hernia 02/17/2022    Diverticulosis 02/22/2023    Right shoulder pain 04/27/2023    High cholesterol 04/24/2023    Acid reflux 04/24/2023    Fatty liver 09/12/2023    Melena 03/13/2024     Resolved Ambulatory Problems     Diagnosis Date Noted    Precordial pain 02/27/2021    Left lower quadrant abdominal tenderness without rebound tenderness 07/08/2021    Diverticulitis 08/22/2022    Fungal infection of nail 08/22/2022    Pain of toe of right foot 08/22/2022    Acute gout involving toe of right foot 08/23/2022     Past Medical History:   Diagnosis Date    Cyst of prostate     Hernia, inguinal     Hypertension          PAST SURGICAL  HISTORY  Past Surgical History:   Procedure Laterality Date    APPENDECTOMY      CLOSED REDUCTION Left     left arm    COLONOSCOPY      INGUINAL HERNIA REPAIR Right 2022    Procedure: INGUINAL HERNIA REPAIR LAPAROSCOPIC WITH DAVINCI ROBOT;  Surgeon: Abelino Davison MD;  Location: [facility] MAIN OR;  Service: Robotics - DaVinci;  Laterality: Right;         FAMILY HISTORY  Family History   Problem Relation Age of Onset    Hypertension Mother     Stroke Father     Hypertension Brother     Heart attack Paternal Grandmother     Heart disease Paternal Grandmother     Diabetes Paternal Grandmother     Cancer Paternal Grandfather         liver    Malig Hyperthermia Neg Hx          SOCIAL HISTORY  Social History     Socioeconomic History    Marital status:    Tobacco Use    Smoking status: Former     Current packs/day: 0.00     Average packs/day: 0.5 packs/day for 10.0 years (5.0 ttl pk-yrs)     Types: Cigarettes     Start date: 1/3/2010     Quit date: 1/3/2020     Years since quittin.2    Smokeless tobacco: Never   Vaping Use    Vaping status: Never Used   Substance and Sexual Activity    Alcohol use: Yes     Comment: occ    Drug use: Never    Sexual activity: Defer         ALLERGIES  Codeine, Keflex [cephalexin], and Penicillins        REVIEW OF SYSTEMS  Review of Systems     Included in HPI  All systems reviewed and negative except for those discussed in HPI.       PHYSICAL EXAM  ED Triage Vitals   Temp Heart Rate Resp BP SpO2   24   97 °F (36.1 °C) 87 18 (!) 165/105 98 %      Temp src Heart Rate Source Patient Position BP Location FiO2 (%)   24 --   Tympanic Monitor Lying Right arm        Physical Exam      GENERAL: no acute distress  HENT: nares patent  EYES: no scleral icterus  CV: regular rhythm, normal rate  RESPIRATORY: normal effort  ABDOMEN: soft  MUSCULOSKELETAL: no  deformity  NEURO:   5/5 strength to hip flexion, knee extension/flexion, dorsiflexion, plantarflexion, and EHL  1+ patellar reflexes bilaterally  SILT at bilateral superficial peroneal, deep peroneal, sural, and saphenous nerves  PSYCH:  calm, cooperative  SKIN: warm, dry    Vital signs and nursing notes reviewed.          LAB RESULTS  No results found for this or any previous visit (from the past 24 hour(s)).    Ordered the above labs and reviewed the results.        RADIOLOGY  XR lumbar spine 2 or 3 views    Result Date: 3/19/2024  INDICATION: Acute low back pain. COMPARISON: None available. FINDINGS: 3 views of the lumbar spine. The alignment is normal. There is no evidence of fracture. Mild disc space narrowing is noted at L4-5 and L5-S1. IMPRESSION: Mild disc space narrowing at L4-5 and L5-S1. Dictated by: Sami Jesus MD Signed by Sami Jesus MD on 3/19/2024 9:07 ##### Final ##### Dictated by:    SAMI JESUS MD-RAD Dictated DT/TM: 03/19/2024 9:07 am Interpreted and electronically signed by:  SAMI JESUS MD-RAD Signed DT/TM:  03/19/2024 9:07 am     Ordered the above noted radiological studies. Reviewed by me in PACS.        MEDICATIONS GIVEN IN ER  Medications   sodium chloride 0.9 % flush 10 mL (has no administration in time range)   HYDROmorphone (DILAUDID) injection 1 mg (has no administration in time range)   ondansetron (ZOFRAN) injection 4 mg (has no administration in time range)         ORDERS PLACED DURING THIS VISIT:  Orders Placed This Encounter   Procedures    Basic Metabolic Panel    CBC Auto Differential    LHA (on-call MD unless specified) Details    Insert Peripheral IV    Initiate Observation Status    CBC & Differential         OUTPATIENT MEDICATION MANAGEMENT:  Current Facility-Administered Medications Ordered in Epic   Medication Dose Route Frequency Provider Last Rate Last Admin    HYDROmorphone (DILAUDID) injection 1 mg  1 mg Intravenous Once Carrington Albert II, MD         ondansetron (ZOFRAN) injection 4 mg  4 mg Intravenous Once Carrington Albert II, MD        sodium chloride 0.9 % flush 10 mL  10 mL Intravenous PRN Carrington Albert II, MD         Current Outpatient Medications Ordered in Epic   Medication Sig Dispense Refill    amLODIPine (NORVASC) 5 MG tablet Take 1.5 tablets by mouth Daily. 135 tablet 1    hydroCHLOROthiazide (HYDRODIURIL) 25 MG tablet TAKE 1 TABLET BY MOUTH EVERY DAY 90 tablet 1    metoprolol succinate XL (TOPROL-XL) 50 MG 24 hr tablet Take 1 tablet by mouth Daily. 90 tablet 1    naproxen (NAPROSYN) 500 MG tablet Take 1 tablet by mouth. (Patient not taking: Reported on 3/13/2024)      omeprazole (priLOSEC) 40 MG capsule Take 1 capsule by mouth As Needed.         PROCEDURES  Procedures          MEDICAL DECISION MAKING, PROGRESS, and CONSULTS    Discussion below represents my analysis of pertinent findings related to patient's condition, differential diagnosis, treatment plan and final disposition.            Differential diagnosis:    Cauda equina syndrome, sciatica, lumbar radiculopathy, herniated disc, spinal cord compression                 Independent interpretation of labs, radiology studies, and discussions with consultants:  ED Course as of 03/19/24 2204   Tue Mar 19, 2024   2115 Medical chart review, reviewed outside imaging from [facility] from earlier today at 9:25 AM.  Patient had review lumbar x-ray.  Mild to space narrowing at L4-5 and L5-S1. [TD]   2200 I discussed the case with Dr. Ramirez, hospitalist.  He will admit. [TD]      ED Course User Index  [TD] Carrington Albert II, MD               DIAGNOSIS  Final diagnoses:   Acute midline low back pain without sciatica   Uncontrolled pain         DISPOSITION  Admit      Latest Documented Vital Signs:  As of 22:04 EDT  BP- (!) 165/105 HR- 87 Temp- 97 °F (36.1 °C) (Tympanic) O2 sat- 98%      --    Please note that portions of this were completed with a voice recognition  program.       Note Disclaimer: At [facility], we believe that sharing information builds trust and better relationships. You are receiving this note because you are receiving care at [facility] or recently visited. It is possible you will see health information before a provider has talked with you about it. This kind of information can be easy to misunderstand. To help you fully understand what it means for your health, we urge you to discuss this note with your provider.         Carrington Albert II, MD  03/19/24 9173

## 2024-03-20 NOTE — H&P
Patient Name:  Juan Mohan  YOB: 1980  MRN:  [id number]  Admit Date:  3/19/2024  Patient Care Team:  Josy Vinson APRN as PCP - General (Nurse Practitioner)  Abelino Davison MD as Consulting Physician (General Surgery)      Subjective   History Present Illness     Chief Complaint   Patient presents with    Back Pain         History of Present Illness  Patient is a 44-year-old male with a history of hypertension, GERD, presented to the ED complaining of back pain started 2 days ago.  Patient reports he was laying in bed when the pain started, located in lower back.denies any specific injury, denies any strenuous exercise, picking up/working with heavy objects.  Does not radiate to the legs but stays in the mid low back and does radiates to the bilateral hips intermittently.  Left side seems to be worse than right.  Describes pain as sharp, constant, worse with movement better at rest.  Denies urinary or bowel incontinence.  Patient was seen at [facility] earlier today, x-rays of the lumbar spine were obtained which showed disc narrowing but no acute fracture.  Patient was discharged on as needed pain meds, muscle relaxants, however pain continued to worsen so presented back to the ED.      Review of Systems   GENERAL: No fevers, chills, sweats.    RESPIRATORY: No cough, shortness of breath, hemoptysis or wheezing.   CVS: No chest pain, palpitations, orthopnea, dyspnea on exertion   GI: No melena or hematochezia. No abdominal pain. No nausea, vomiting, constipation, diarrhea  MSK: : + lower back pain   Personal History     Past Medical History:   Diagnosis Date    Cyst of prostate     NO PROBLEMS CURRENTLY    Diverticulosis     Hernia, inguinal     right    Hypertension      Past Surgical History:   Procedure Laterality Date    APPENDECTOMY      CLOSED REDUCTION Left     left arm    COLONOSCOPY      INGUINAL HERNIA REPAIR Right 2/28/2022    Procedure: INGUINAL  HERNIA REPAIR LAPAROSCOPIC WITH DAVINCI ROBOT;  Surgeon: Abelino Davison MD;  Location: [facility] MAIN OR;  Service: Robotics - DaVinci;  Laterality: Right;     Family History   Problem Relation Age of Onset    Hypertension Mother     Stroke Father     Hypertension Brother     Heart attack Paternal Grandmother     Heart disease Paternal Grandmother     Diabetes Paternal Grandmother     Cancer Paternal Grandfather         liver    Malig Hyperthermia Neg Hx      Social History     Tobacco Use    Smoking status: Former     Current packs/day: 0.00     Average packs/day: 0.5 packs/day for 10.0 years (5.0 ttl pk-yrs)     Types: Cigarettes     Start date: 1/3/2010     Quit date: 1/3/2020     Years since quittin.2    Smokeless tobacco: Never   Vaping Use    Vaping status: Never Used   Substance Use Topics    Alcohol use: Yes     Comment: occ    Drug use: Never     No current facility-administered medications on file prior to encounter.     Current Outpatient Medications on File Prior to Encounter   Medication Sig Dispense Refill    hydroCHLOROthiazide (HYDRODIURIL) 25 MG tablet TAKE 1 TABLET BY MOUTH EVERY DAY 90 tablet 1    omeprazole (priLOSEC) 40 MG capsule Take 1 capsule by mouth As Needed.      amLODIPine (NORVASC) 5 MG tablet Take 1.5 tablets by mouth Daily. 135 tablet 1    metoprolol succinate XL (TOPROL-XL) 50 MG 24 hr tablet Take 1 tablet by mouth Daily. 90 tablet 1    naproxen (NAPROSYN) 500 MG tablet Take 1 tablet by mouth. (Patient not taking: Reported on 3/13/2024)       Allergies   Allergen Reactions    Codeine Anaphylaxis    Keflex [Cephalexin] Anaphylaxis    Penicillins Anaphylaxis       Objective    Objective     Vital Signs  Temp:  [97 °F (36.1 °C)-97.6 °F (36.4 °C)] 97.6 °F (36.4 °C)  Heart Rate:  [53-87] 53  Resp:  [18] 18  BP: (122-165)/() 122/87  SpO2:  [96 %-100 %] 100 %  on   ;   Device (Oxygen Therapy): room air  Body mass index is 33.67 kg/m².    Physical Exam  General: Alert and oriented  x3, no acute distress.  HEENT: Normocephalic, atraumatic  Eyes: PERRL, EOMI, anicteric sclerae  Lungs: Clear to auscultation bilaterally, no crackles or wheezes  CV: Regular rate and rhythm, no murmurs rubs or gallops  Abdomen: Soft, nontender, nondistended.  Normoactive bowel sounds  MSK:  lumbar spinal tenderness palpation, left paraspinal region tenderness to palpation.  Limited range of motion primarily in the left  lower extremity due to pain.  Skin: Clean/dry/intact, no rashes  Neuro: Cranial nerves II through XII intact, no gross focal neurological deficits appreciated  Psych: Appropriate mood and affect      Results Review:  I reviewed the patient's new clinical results.  I reviewed the patient's new imaging results and agree with the interpretation.  I reviewed the patient's other test results and agree with the interpretation  I personally viewed and interpreted the patient's EKG/Telemetry data  Discussed with ED provider.    Lab Results (last 24 hours)       Procedure Component Value Units Date/Time    CBC & Differential [[id number]]  (Abnormal) Collected: 03/19/24 2241    Specimen: Blood Updated: 03/19/24 2250    Narrative:      The following orders were created for panel order CBC & Differential.  Procedure                               Abnormality         Status                     ---------                               -----------         ------                     CBC Auto Differential[[id number]]        Abnormal            Final result                 Please view results for these tests on the individual orders.    Basic Metabolic Panel [[id number]]  (Abnormal) Collected: 03/19/24 2241    Specimen: Blood Updated: 03/19/24 2312     Glucose 169 mg/dL      BUN 19 mg/dL      Creatinine 1.14 mg/dL      Sodium 141 mmol/L      Potassium 3.6 mmol/L      Chloride 106 mmol/L      CO2 27.0 mmol/L      Calcium 9.2 mg/dL      BUN/Creatinine Ratio 16.7     Anion Gap 8.0 mmol/L      eGFR 82.3 mL/min/1.73      Narrative:      GFR Normal >60  Chronic Kidney Disease <60  Kidney Failure <15      CBC Auto Differential [[id number]]  (Abnormal) Collected: 03/19/24 2241    Specimen: Blood Updated: 03/19/24 2250     WBC 5.92 10*3/mm3      RBC 4.72 10*6/mm3      Hemoglobin 14.6 g/dL      Hematocrit 42.6 %      MCV 90.3 fL      MCH 30.9 pg      MCHC 34.3 g/dL      RDW 12.2 %      RDW-SD 39.9 fl      MPV 10.3 fL      Platelets 208 10*3/mm3      Neutrophil % 49.8 %      Lymphocyte % 35.8 %      Monocyte % 11.0 %      Eosinophil % 2.2 %      Basophil % 1.0 %      Immature Grans % 0.2 %      Neutrophils, Absolute 2.95 10*3/mm3      Lymphocytes, Absolute 2.12 10*3/mm3      Monocytes, Absolute 0.65 10*3/mm3      Eosinophils, Absolute 0.13 10*3/mm3      Basophils, Absolute 0.06 10*3/mm3      Immature Grans, Absolute 0.01 10*3/mm3      nRBC 0.0 /100 WBC             Imaging Results (Last 24 Hours)       ** No results found for the last 24 hours. **            Results for orders placed during the hospital encounter of 02/24/21    Adult Stress Echo W/ Cont or Stress Agent if Necessary Per Protocol    Interpretation Summary  · Calculated left ventricular EF = 56% Estimated left ventricular EF = 56% Estimated left ventricular EF was in agreement with the calculated left ventricular EF. Left ventricular systolic function is normal. Normal global longitudinal LV strain (GLS) = -19.6%. Left ventricle strain data was reviewed by the physician and found to be accurate. Normal left ventricular cavity size and wall thickness noted. All left ventricular wall segments contract normally. Left ventricular diastolic function was normal.  · No aortic valve regurgitation or stenosis is present. The aortic valve is abnormal in structure. There is mild calcification of the aortic valve mainly affecting the right coronary cusp(s).  · Trace mitral valve regurgitation is present.  · Trace tricuspid valve regurgitation is present. Estimated right ventricular  systolic pressure from tricuspid regurgitation is normal (<35 mmHg). Calculated right ventricular systolic pressure from tricuspid regurgitation is 21 mmHg.  · Findings consistent with a normal ECG stress test.  · Normal stress echo with no significant echocardiographic evidence for myocardial ischemia.      No orders to display        Assessment/Plan     Active Hospital Problems    Diagnosis  POA    **Low back pain [M54.50]  Yes    Hyperglycemia [R73.9]  Unknown    Acid reflux [K21.9]  Yes    Essential hypertension [I10]  Yes      Resolved Hospital Problems   No resolved problems to display.     Patient is a 44-year-old male with a history of hypertension, GERD, presented to the ED complaining of back pain started 2 days ago.      Low back pain  -Started 2 days ago, gradually worsening  -X-ray of the lumbar spine at [facility] ED showed mild disc space narrowing is noted at L4-5 and L5-S1, but no acute fracture.  -Patient continues to complain of severe pain,  -Initiated on as needed Dilaudid 1 g every 2 as needed for severe pain, Percocet 7.5 mg every 4 as needed for moderate pain, as needed muscle methocarbamol  -P.o. prednisone 40 mg for 3 days  -Lidocaine patch  -Ordered MRI of lumbar spine with and without contrast.        Essential hypertension  -BP acutely stable   -Resume home amlodipine, metoprolol, HCTZ      Hyperglycemia   -Hemoglobin A1c ordered    GERD  -PPI      I discussed the patient's findings and my recommendations with patient.    VTE Prophylaxis - SCDs.  Code Status - Full code.       Celeste Ramirez MD  [facility]  03/20/24  01:03 EDT

## 2024-03-20 NOTE — ED NOTES
"Nursing report ED to floor  Juan Mohan  44 y.o.  male    HPI :  HPI (Adult)  Stated Reason for Visit: Pt presents to ED by private vehicle with lower back pain Xs 2 days. States he was seen at  today and told to come back if anything changed or got worse. Pain worsened in the last couple hours. Denies falls or injuries. Pain worsening with position changes, unrelieved by medication  History Obtained From: patient  Precipitating Event(s): unknown  Onset of Symptoms: worsening  Duration (Days): 2    Chief Complaint  Chief Complaint   Patient presents with    Back Pain       Admitting doctor:   Celeste Ramirez MD    Admitting diagnosis:   The primary encounter diagnosis was Acute midline low back pain without sciatica. A diagnosis of Uncontrolled pain was also pertinent to this visit.    Code status:   Current Code Status       Date Active Code Status Order ID Comments User Context       Not on file            Allergies:   Codeine, Keflex [cephalexin], and Penicillins    Isolation:   No active isolations    Intake and Output  No intake or output data in the 24 hours ending 03/19/24 2258    Weight:       03/19/24 2056   Weight: 113 kg (250 lb)       Most recent vitals:   Vitals:    03/19/24 2056 03/19/24 2104 03/19/24 2235   BP:  (!) 165/105 125/89   BP Location:  Right arm    Patient Position:  Lying    Pulse: 87  61   Resp: 18     Temp: 97 °F (36.1 °C)     TempSrc: Tympanic     SpO2: 98%  96%   Weight: 113 kg (250 lb)     Height: 182.9 cm (72\")         Active LDAs/IV Access:   Lines, Drains & Airways       Active LDAs       Name Placement date Placement time Site Days    Peripheral IV 03/19/24 2240 Right Antecubital 03/19/24 2240  Antecubital  less than 1                    Labs (abnormal labs have a star):   Labs Reviewed   CBC WITH AUTO DIFFERENTIAL - Abnormal; Notable for the following components:       Result Value    RDW 12.2 (*)     All other components within normal limits   BASIC METABOLIC PANEL "   CBC AND DIFFERENTIAL    Narrative:     The following orders were created for panel order CBC & Differential.  Procedure                               Abnormality         Status                     ---------                               -----------         ------                     CBC Auto Differential[[id number]]        Abnormal            Final result                 Please view results for these tests on the individual orders.       EKG:   No orders to display       Meds given in ED:   Medications   sodium chloride 0.9 % flush 10 mL (has no administration in time range)   HYDROmorphone (DILAUDID) injection 1 mg (1 mg Intravenous Given 3/19/24 7896)   ondansetron (ZOFRAN) injection 4 mg (4 mg Intravenous Given 3/19/24 2247)       Imaging results:  No radiology results for the last day    Ambulatory status:   - ax1    Social issues:   Social History     Socioeconomic History    Marital status:    Tobacco Use    Smoking status: Former     Current packs/day: 0.00     Average packs/day: 0.5 packs/day for 10.0 years (5.0 ttl pk-yrs)     Types: Cigarettes     Start date: 1/3/2010     Quit date: 1/3/2020     Years since quittin.2    Smokeless tobacco: Never   Vaping Use    Vaping status: Never Used   Substance and Sexual Activity    Alcohol use: Yes     Comment: occ    Drug use: Never    Sexual activity: Defer       Peripheral Neurovascular  Peripheral Neurovascular (Adult)  Peripheral Neurovascular WDL: WDL    Neuro Cognitive  Neuro Cognitive (Adult)  Cognitive/Neuro/Behavioral WDL: WDL    Learning  Learning Assessment (Adult)  Learning Readiness and Ability: no barriers identified    Respiratory  Respiratory WDL  Respiratory WDL: WDL, all  Rhythm/Pattern, Respiratory: no shortness of breath reported, depth regular, pattern regular, unlabored    Abdominal Pain       Pain Assessments  Pain (Adult)  (0-10) Pain Rating: Rest: 8  Pain Location: back  Pain Side/Orientation: lower  Pain Description:  constant    NIH Stroke Scale       Taina Camacho RN  03/19/24 22:58 EDT

## 2024-03-20 NOTE — PROGRESS NOTES
Name: Juan Mohan ADMIT: 3/19/2024   : 1980  PCP: Josy Vinson APRN    MRN: [id number] LOS: 0 days   AGE/SEX: 44 y.o. male  ROOM: [facility]     Subjective   Subjective   Patient is lying in the bed and does not appear to be any major distress.  Denies nausea, vomiting, abdominal pain, chest pain.       Objective   Objective   Vital Signs  Temp:  [97 °F (36.1 °C)-98.6 °F (37 °C)] 98.2 °F (36.8 °C)  Heart Rate:  [53-90] 89  Resp:  [18] 18  BP: (115-165)/() 128/90  SpO2:  [95 %-100 %] 99 %  on   ;   Device (Oxygen Therapy): room air  Body mass index is 33.67 kg/m².  Physical Exam  HEENT: PERRLA, extraocular movements intact, Scleras no icterus  Neck: Supple, no JVD  Cardiovascular: Regular rate and rhythm with normal S1-S2  Respiratory: Fairly clear to auscultation bilaterally with no wheezes  GI: Soft, nontender, bowel sounds are present  Extremities: No edema, palpable pulses, good strength in bilateral lower extremities  Neurologic: Grossly nonfocal, no facial asymmetry    Results Review     I reviewed the patient's new clinical results.  Results from last 7 days   Lab Units 24  0633 24  2241   WBC 10*3/mm3 5.22 5.92   HEMOGLOBIN g/dL 15.1 14.6   PLATELETS 10*3/mm3 204 208     Results from last 7 days   Lab Units 24  0633 24  2241   SODIUM mmol/L 139 141   POTASSIUM mmol/L 4.0 3.6   CHLORIDE mmol/L 107 106   CO2 mmol/L 16.5* 27.0   BUN mg/dL 15 19   CREATININE mg/dL 0.96 1.14   GLUCOSE mg/dL 92 169*   EGFR mL/min/1.73 101.2 82.3       Results from last 7 days   Lab Units 24  0633 24  2241   CALCIUM mg/dL 8.8 9.2   MAGNESIUM mg/dL 3.2*  --    PHOSPHORUS mg/dL 3.4  --        Hemoglobin A1C   Date/Time Value Ref Range Status   2024 0633 5.10 4.80 - 5.60 % Final       No radiology results for the last day    I have personally reviewed all medications:  Scheduled Medications  amLODIPine, 7.5 mg, Oral, Daily  gadobenate dimeglumine, 20 mL, Intravenous,  Once in imaging  hydroCHLOROthiazide, 25 mg, Oral, Daily  Lidocaine, 1 patch, Transdermal, Q24H  metoprolol succinate XL, 50 mg, Oral, Daily  pantoprazole, 40 mg, Oral, Q AM  predniSONE, 40 mg, Oral, Daily With Breakfast  senna-docusate sodium, 2 tablet, Oral, BID  sodium chloride, 10 mL, Intravenous, Q12H    Infusions   Diet  Diet: Cardiac; Healthy Heart (2-3 Na+); Fluid Consistency: Thin (IDDSI 0)    I have personally reviewed:  [x]  Laboratory   [x]  Microbiology   [x]  Radiology   [x]  EKG/Telemetry  [x]  Cardiology/Vascular   []  Pathology    []  Records       Assessment/Plan     Active Hospital Problems    Diagnosis  POA    **Low back pain [M54.50]  Yes    Hyperglycemia [R73.9]  Unknown    Acid reflux [K21.9]  Yes    Essential hypertension [I10]  Yes      Resolved Hospital Problems   No resolved problems to display.       44 y.o. male admitted with Low back pain.      1.Low back pain with mild radiation to his anterior thighs, due for MRI and no evidence of any urinary or bowel incontinence.  Continue with analysis, lidocaine, prednisone.  2.  Hyperglycemia, hemoglobin A1c is 5.10.  3.  Hypertension, continue with home metoprolol, amlodipine, HCTZ.  4.  GERD, on acid suppressive therapy.  5.  On SCDs for DVT prophylaxis.  6.  CODE STATUS is full code    Sachin Johns MD  [facility]  03/20/24  16:30 EDT

## 2024-03-20 NOTE — THERAPY EVALUATION
Patient Name: Juan Mohan  : 1980    MRN: [id number]                              Today's Date: 3/20/2024       Admit Date: 3/19/2024    Visit Dx:     ICD-10-CM ICD-9-CM   1. Acute midline low back pain without sciatica  M54.50 724.2   2. Uncontrolled pain  R52 780.96     Patient Active Problem List   Diagnosis    Abnormal EKG    Essential hypertension    Dyslipidemia    Constipation    Prostate irregularity    Spleen enlargement    Renal cyst    Inguinal hernia    Diverticulosis    Right shoulder pain    High cholesterol    Acid reflux    Fatty liver    Melena    Low back pain    Hyperglycemia     Past Medical History:   Diagnosis Date    Cyst of prostate     NO PROBLEMS CURRENTLY    Diverticulosis     Hernia, inguinal     right    Hypertension      Past Surgical History:   Procedure Laterality Date    APPENDECTOMY      CLOSED REDUCTION Left     left arm    COLONOSCOPY      INGUINAL HERNIA REPAIR Right 2022    Procedure: INGUINAL HERNIA REPAIR LAPAROSCOPIC WITH 2UINCI ROBOT;  Surgeon: Abelino Davison MD;  Location: [facility];  Service: [employer];  Laterality: Right;      General Information       Row Name 24 1549          Physical Therapy Time and Intention    Document Type evaluation (P)   -JG     Mode of Treatment individual therapy;physical therapy (P)   -JG       Row Name 24 1549          General Information    Patient Profile Reviewed yes (P)   -JG     Prior Level of Function independent: (P)   -JG     Existing Precautions/Restrictions fall (P)   -JG     Barriers to Rehab none identified (P)   -JG       Row Name 24 1549          Living Environment    People in Home spouse (P)   -JG       Row Name 24 1549          Home Main Entrance    Number of Stairs, Main Entrance none (P)   built ramp for handicapped brother over steps  -JG       Row Name 24 1549          Cognition    Orientation Status (Cognition) oriented x 4 (P)   -JG       Row Name  03/20/24 1549          Safety Issues, Functional Mobility    Safety Issues Affecting Function (Mobility) safety precaution awareness;safety precautions follow-through/compliance (P)   -JG     Impairments Affecting Function (Mobility) balance;endurance/activity tolerance;strength (P)   -JG               User Key  (r) = Recorded By, (t) = Taken By, (c) = Cosigned By      Initials Name Provider Type    JG Fortunato Allen, PT Student PT Student                   Mobility       Row Name 03/20/24 1551          Bed Mobility    Bed Mobility supine-sit;sit-supine (P)   -JG     Supine-Sit Borden (Bed Mobility) standby assist;verbal cues;nonverbal cues (demo/gesture) (P)   -JG     Sit-Supine [place] (Bed Mobility) standby assist;verbal cues;nonverbal cues (demo/gesture) (P)   -JG     Comment, (Bed Mobility) extra time needed as pt is extremely guarded with all motion (P)   -JG       Row Name 03/20/24 1551          Sit-Stand Transfer    Sit-Stand Borden (Transfers) minimum assist (75% patient effort);1 person assist;verbal cues;nonverbal cues (demo/gesture) (P)   -JG     Assistive Device (Sit-Stand Transfers) walker, front-wheeled (P)   -JG     Comment, (Sit-Stand Transfer) extra time needed secondary to guarding (P)   -JG       Row Name 03/20/24 1551          Gait/Stairs (Locomotion)    Gait/Stairs Locomotion gait/ambulation assistive device (P)   -JG     B[place] Level (Gait) contact guard;verbal cues;nonverbal cues (demo/gesture) (P)   -JG     Assistive Device (Gait) walker, front-wheeled (P)   -JG     Patient was able to Ambulate yes (P)   -JG     Distance in Feet (Gait) 60 (P)   -JG     Deviations/Abnormal Patterns (Gait) donna decreased;gait speed decreased (P)   -JG     Bilateral Gait Deviations forward flexed posture (P)   -JG     Gait Assessment/Intervention no overt lob. slow walking to avoid back pain (P)   -JG               User Key  (r) = Recorded By, (t) = Taken By, (c) = Cosigned By       Initials Name Provider Type    MILDRED Fortunato Allen, PT Student PT Student                   Obj/Interventions       [facility] Name 03/20/24 1554          Range of Motion Comprehensive    General Range of Motion bilateral lower extremity ROM WFL (P)   -JG       Row Name 03/20/24 1554          Strength Comprehensive (MMT)    General Manual Muscle Testing (MMT) Assessment no strength deficits identified (P)   -JG       [facility] Name 03/20/24 1554          Motor Skills    Therapeutic Exercise other (see comments) (P)   10x reps BLE AP, LAQ, standing marches  -JG       Row Name 03/20/24 1554          Balance    Balance Assessment sitting static balance;sitting dynamic balance;standing static balance;sit to stand dynamic balance;standing dynamic balance (P)   -JG     Static Sitting Balance independent (P)   -JG     Dynamic Sitting Balance independent (P)   -JG     Position, Sitting Balance unsupported;sitting edge of bed (P)   -JG     Sit to Stand Dynamic Balance minimal assist;1-person assist;verbal cues;non-verbal cues (demo/gesture) (P)   -JG     Static Standing Balance contact guard;verbal cues;non-verbal cues (demo/gesture) (P)   -JG     Dynamic Standing Balance contact guard;verbal cues;non-verbal cues (demo/gesture) (P)   -JG     Position/Device Used, Standing Balance supported;walker, front-wheeled (P)   -JG               User Key  (r) = Recorded By, (t) = Taken By, (c) = Cosigned By      Initials Name Provider Type    MILDRED Fortunato Allen, PT Student PT Student                   Goals/Plan       Row Name 03/20/24 1604          Bed Mobility Goal 1 (PT)    Activity/Assistive Device (Bed Mobility Goal 1, PT) bed mobility activities, all (P)   -JG     [place]eno Level/Cues Needed (Bed Mobility Goal 1, PT) independent (P)   -JG     Time Frame (Bed Mobility Goal 1, PT) 1 week (P)   -JG       Row Name 03/20/24 1604          Transfer Goal 1 (PT)    Activity/Assistive Device (Transfer Goal 1, PT) sit-to-stand/stand-to-sit (P)   -JG      Hardy Level/Cues Needed (Transfer Goal 1, PT) independent (P)   -JG     Time Frame (Transfer Goal 1, PT) 1 week (P)   -JG       Row Name 03/20/24 1600          Gait Training Goal 1 (PT)    Activity/Assistive Device (Gait Training Goal 1, PT) gait (walking locomotion) (P)   -JG     Hardy Level (Gait Training Goal 1, PT) standby assist (P)   -JG     Time Frame (Gait Training Goal 1, PT) 1 week (P)   -JG       Row Name 03/20/24 1601          Therapy Assessment/Plan (PT)    Planned Therapy Interventions (PT) balance training;bed mobility training;gait training;home exercise program;patient/family education;ROM (range of motion);stair training;strengthening;stretching;transfer training (P)   -JG               User Key  (r) = Recorded By, (t) = Taken By, (c) = Cosigned By      Initials Name Provider Type    Fortunato uQevedo, PT Student PT Student                   Clinical Impression       Row Name 03/20/24 2996          Pain    Pre/Posttreatment Pain Comment back pain that radiates into buttocks with movement. no numerical value assigned. pt reports being better today than yesterday (P)   -JG       Row Name 03/20/24 6854          Plan of Care Review    Plan of Care Reviewed With patient (P)   -JG     Progress improving (P)   -JG     Outcome Evaluation Pt is a 45 yo male who was admitted to [facility] with acute low back pain. Pt was at [facility] early in the AM on the same day as admission to [facility] (3/19) for the same reason. Pt reports that [facility] stated to come back to be seen if pain persists or gets worse. Imaging (radiograph) at [facility] obtained with disc space narrowing found in the lumbar region. Pain is reported to radiate down both legs with movement. PMH significant for HTN and GERD. Pt states that he has a history of spinal stenosis during PT evaluation. Supine -> sit completed with SBA and use of bed rails. Pt encourged to roll with bed mobility to avoid twisting. EOB exercises performed with no LOB. STS  from EOB with MinAx1, rwx, and extra time as pt is guarded with all movement to avoid shooting pain. Pt ambulated 60' with CGA and rwx without overt LOB. Pt slow with ambulation secondary to guarded movement. Continued skilled therapy is neeeded to address functional mobility deficits and increase tolerance to functional activity. D/C to home with outpatient PT pending course of care. (P)   -JG       Row Name 03/20/24 1555          Therapy Assessment/Plan (PT)    Rehab Potential (PT) good, to achieve stated therapy goals (P)   -JG     Criteria for Skilled Interventions Met (PT) yes;skilled treatment is necessary (P)   -JG     Therapy Frequency (PT) 5 times/wk (P)   -JG       Row Name 03/20/24 1555          Positioning and Restraints    Pre-Treatment Position in bed (P)   -JG     Post Treatment Position bed (P)   -JG     In Bed supine;call light within reach;encouraged to call for assist;exit alarm on;side rails up x3 (P)   -JG               User Key  (r) = Recorded By, (t) = Taken By, (c) = Cosigned By      Initials Name Provider Type    Fortunato Quevedo, PT Student PT Student                   Outcome Measures       Row Name 03/20/24 1605 03/20/24 0800       How much help from another person do you currently need...    Turning from your back to your side while in flat bed without using bedrails? 4 (P)   -JG 3  -MG    Moving from lying on back to sitting on the side of a flat bed without bedrails? 3 (P)   -JG 3  -MG    Moving to and from a bed to a chair (including a wheelchair)? 3 (P)   -JG 3  -MG    Standing up from a chair using your arms (e.g., wheelchair, bedside chair)? 3 (P)   -JG 3  -MG    Climbing 3-5 steps with a railing? 3 (P)   -JG 2  -MG    To walk in hospital room? 3 (P)   -JG 3  -MG    AM-PAC 6 Clicks Score (PT) 19 (P)   -JG 17  -MG    Highest Level of Mobility Goal 6 --> Walk 10 steps or more (P)   -JG 5 --> Static standing  -MG      Row Name 03/20/24 1605 03/20/24 1141       Functional Assessment     Outcome Measure Options AM-PAC 6 Clicks Basic Mobility (PT) (P)   -SAVANAH AM-PAC 6 Clicks Basic Mobility (PT)  -EM              User Key  (r) = Recorded By, (t) = Taken By, (c) = Cosigned By      Initials Name Provider Type    EM Ivania Morales, PT Physical Therapist    Marya Paredes RN Registered Nurse    Fortunato Quevedo, PT Student PT Student                                 Physical Therapy Education       Title: PT OT SLP Therapies (In Progress)       Topic: Physical Therapy (In Progress)       Point: Mobility training (Done)       Learning Progress Summary             Patient Acceptance, E, VU by  at 3/20/2024 1605                         Point: Home exercise program (Not Started)       Learner Progress:  Not documented in this visit.              Point: Body mechanics (Not Started)       Learner Progress:  Not documented in this visit.              Point: Precautions (Done)       Learning Progress Summary             Patient Acceptance, E, VU by  at 3/20/2024 1605                                         User Key       Initials Effective Dates Name Provider Type Discipline     02/29/24 -  Fortunato Allen, PT Student PT Student PT                  PT Recommendation and Plan  Planned Therapy Interventions (PT): (P) balance training, bed mobility training, gait training, home exercise program, patient/family education, ROM (range of motion), stair training, strengthening, stretching, transfer training  Plan of Care Reviewed With: (P) patient  Progress: (P) improving  Outcome Evaluation: (P) Pt is a 45 yo male who was admitted to [facility] with acute low back pain. Pt was at [facility] early in the AM on the same day as admission to [facility] (3/19) for the same reason. Pt reports that [facility] stated to come back to be seen if pain persists or gets worse. Imaging (radiograph) at [facility] obtained with disc space narrowing found in the lumbar region. Pain is reported to radiate down both legs with movement. PMH significant  for HTN and GERD. Pt states that he has a history of spinal stenosis during PT evaluation. Supine -> sit completed with SBA and use of bed rails. Pt encourged to roll with bed mobility to avoid twisting. EOB exercises performed with no LOB. STS from EOB with MinAx1, rwx, and extra time as pt is guarded with all movement to avoid shooting pain. Pt ambulated 60' with CGA and rwx without overt LOB. Pt slow with ambulation secondary to guarded movement. Continued skilled therapy is neeeded to address functional mobility deficits and increase tolerance to functional activity. D/C to home with outpatient PT pending course of care.     Time Calculation:         PT Charges       Row Name 03/20/24 1606             Time Calculation    Start Time 1529 (P)   -JG      Stop Time 1540 (P)   -JG      Time Calculation (min) 11 min (P)   -JG      PT Received On 03/20/24 (P)   -JG      PT - Next Appointment 03/21/24 (P)   -JG      PT Goal Re-Cert Due Date 03/27/24 (P)   -JG         Time Calculation- PT    Total Timed Code Minutes- PT 8 minute(s) (P)   -JG         Timed Charges    42014 - PT Therapeutic Activity Minutes 8 (P)   -JG         Total Minutes    Timed Charges Total Minutes 8 (P)   -JG       Total Minutes 8 (P)   -JG                User Key  (r) = Recorded By, (t) = Taken By, (c) = Cosigned By      Initials Name Provider Type    JG Fortunato Allen, PT Student PT Student                  Therapy Charges for Today       Code Description Service Date Service Provider Modifiers Qty    [id number]  PT THERAPEUTIC ACT EA 15 MIN 3/20/2024 Fortunato Allen PT Student GP 1    [id number]  PT EVAL MOD COMPLEXITY 2 3/20/2024 Fortunato Allen, PT Student GP 1            PT G-Codes  Outcome Measure Options: (P) AM-PAC 6 Clicks Basic Mobility (PT)  AM-PAC 6 Clicks Score (PT): (P) 19  PT Discharge Summary  Anticipated Discharge Disposition (PT): (P) home with outpatient therapy services    CATHERINE Cuevas  3/20/2024

## 2024-03-20 NOTE — PLAN OF CARE
Goal Outcome Evaluation:  Plan of Care Reviewed With: (P) patient        Progress: (P) improving  Outcome Evaluation: (P) Pt is a 45 yo male who was admitted to [facility] with acute low back pain. Pt was at [facility] early in the AM on the same day as admission to [facility] (3/19) for the same reason. Pt reports that [facility] stated to come back to be seen if pain persists or gets worse. Imaging (radiograph) at [facility] obtained with disc space narrowing found in the lumbar region. Pain is reported to radiate down both legs with movement. PMH significant for HTN and GERD. Pt states that he has a history of spinal stenosis during PT evaluation. Supine -> sit completed with SBA and use of bed rails. Pt encourged to roll with bed mobility to avoid twisting. EOB exercises performed with no LOB. STS from EOB with MinAx1, rwx, and extra time as pt is guarded with all movement to avoid shooting pain. Pt ambulated 60' with CGA and rwx without overt LOB. Pt slow with ambulation secondary to guarded movement. Continued skilled therapy is neeeded to address functional mobility deficits and increase tolerance to functional activity. D/C to home with outpatient PT pending course of care.      Anticipated Discharge Disposition (PT): (P) home with outpatient therapy services

## 2024-03-21 ENCOUNTER — ANESTHESIA (OUTPATIENT)
Dept: PAIN MEDICINE | Facility: HOSPITAL | Age: 44
End: 2024-03-21
Payer: COMMERCIAL

## 2024-03-21 ENCOUNTER — ANESTHESIA EVENT (OUTPATIENT)
Dept: PAIN MEDICINE | Facility: HOSPITAL | Age: 44
End: 2024-03-21
Payer: COMMERCIAL

## 2024-03-21 ENCOUNTER — APPOINTMENT (OUTPATIENT)
Dept: GENERAL RADIOLOGY | Facility: HOSPITAL | Age: 44
End: 2024-03-21
Payer: COMMERCIAL

## 2024-03-21 ENCOUNTER — APPOINTMENT (OUTPATIENT)
Dept: PAIN MEDICINE | Facility: HOSPITAL | Age: 44
End: 2024-03-21
Payer: COMMERCIAL

## 2024-03-21 ENCOUNTER — READMISSION MANAGEMENT (OUTPATIENT)
Dept: CALL CENTER | Facility: HOSPITAL | Age: 44
End: 2024-03-21
Payer: COMMERCIAL

## 2024-03-21 VITALS
HEART RATE: 78 BPM | HEIGHT: 72 IN | DIASTOLIC BLOOD PRESSURE: 103 MMHG | RESPIRATION RATE: 16 BRPM | OXYGEN SATURATION: 96 % | TEMPERATURE: 97.9 F | WEIGHT: 248.24 LBS | SYSTOLIC BLOOD PRESSURE: 126 MMHG | BODY MASS INDEX: 33.62 KG/M2

## 2024-03-21 PROCEDURE — 77003 FLUOROGUIDE FOR SPINE INJECT: CPT

## 2024-03-21 PROCEDURE — 63710000001 PREDNISONE PER 1 MG: Performed by: STUDENT IN AN ORGANIZED HEALTH CARE EDUCATION/TRAINING PROGRAM

## 2024-03-21 PROCEDURE — G0378 HOSPITAL OBSERVATION PER HR: HCPCS

## 2024-03-21 PROCEDURE — 97116 GAIT TRAINING THERAPY: CPT

## 2024-03-21 PROCEDURE — 96376 TX/PRO/DX INJ SAME DRUG ADON: CPT

## 2024-03-21 PROCEDURE — 99221 1ST HOSP IP/OBS SF/LOW 40: CPT | Performed by: NURSE PRACTITIONER

## 2024-03-21 PROCEDURE — 25010000002 METHYLPREDNISOLONE PER 80 MG: Performed by: ANESTHESIOLOGY

## 2024-03-21 RX ORDER — OXYCODONE AND ACETAMINOPHEN 7.5; 325 MG/1; MG/1
1 TABLET ORAL EVERY 6 HOURS PRN
Qty: 20 TABLET | Refills: 0 | Status: SHIPPED | OUTPATIENT
Start: 2024-03-21 | End: 2024-03-26

## 2024-03-21 RX ORDER — MIDAZOLAM HYDROCHLORIDE 1 MG/ML
1 INJECTION INTRAMUSCULAR; INTRAVENOUS ONCE AS NEEDED
Status: DISCONTINUED | OUTPATIENT
Start: 2024-03-21 | End: 2024-03-21 | Stop reason: HOSPADM

## 2024-03-21 RX ORDER — IOPAMIDOL 408 MG/ML
10 INJECTION, SOLUTION INTRATHECAL
Status: DISCONTINUED | OUTPATIENT
Start: 2024-03-21 | End: 2024-03-21 | Stop reason: HOSPADM

## 2024-03-21 RX ORDER — METHYLPREDNISOLONE ACETATE 80 MG/ML
80 INJECTION, SUSPENSION INTRA-ARTICULAR; INTRALESIONAL; INTRAMUSCULAR; SOFT TISSUE ONCE
Status: COMPLETED | OUTPATIENT
Start: 2024-03-21 | End: 2024-03-21

## 2024-03-21 RX ORDER — LIDOCAINE HYDROCHLORIDE 10 MG/ML
1 INJECTION, SOLUTION INFILTRATION; PERINEURAL ONCE
Status: DISCONTINUED | OUTPATIENT
Start: 2024-03-21 | End: 2024-03-21 | Stop reason: HOSPADM

## 2024-03-21 RX ORDER — FENTANYL CITRATE 50 UG/ML
50 INJECTION, SOLUTION INTRAMUSCULAR; INTRAVENOUS ONCE
Status: DISCONTINUED | OUTPATIENT
Start: 2024-03-21 | End: 2024-03-21 | Stop reason: HOSPADM

## 2024-03-21 RX ADMIN — PREDNISONE 40 MG: 20 TABLET ORAL at 08:25

## 2024-03-21 RX ADMIN — OXYCODONE AND ACETAMINOPHEN 1 TABLET: 7.5; 325 TABLET ORAL at 04:28

## 2024-03-21 RX ADMIN — HYDROCHLOROTHIAZIDE 25 MG: 25 TABLET ORAL at 08:24

## 2024-03-21 RX ADMIN — PANTOPRAZOLE SODIUM 40 MG: 40 TABLET, DELAYED RELEASE ORAL at 06:08

## 2024-03-21 RX ADMIN — OXYCODONE AND ACETAMINOPHEN 1 TABLET: 7.5; 325 TABLET ORAL at 12:00

## 2024-03-21 RX ADMIN — AMLODIPINE BESYLATE 7.5 MG: 2.5 TABLET ORAL at 08:25

## 2024-03-21 RX ADMIN — METHYLPREDNISOLONE ACETATE 80 MG: 80 INJECTION, SUSPENSION INTRA-ARTICULAR; INTRALESIONAL; INTRAMUSCULAR; SOFT TISSUE at 12:38

## 2024-03-21 RX ADMIN — METOPROLOL SUCCINATE 50 MG: 50 TABLET, EXTENDED RELEASE ORAL at 08:24

## 2024-03-21 RX ADMIN — POLYETHYLENE GLYCOL 3350 17 G: 17 POWDER, FOR SOLUTION ORAL at 08:29

## 2024-03-21 RX ADMIN — Medication 10 ML: at 08:30

## 2024-03-21 RX ADMIN — LIDOCAINE 1 PATCH: 4 PATCH TOPICAL at 08:25

## 2024-03-21 RX ADMIN — DOCUSATE SODIUM 50MG AND SENNOSIDES 8.6MG 2 TABLET: 8.6; 5 TABLET, FILM COATED ORAL at 08:24

## 2024-03-21 NOTE — PROGRESS NOTES
Case Management Discharge Note      Final Note: Home with walker (delivered via [facility] to bedside) and plan for outpatient therapy ([facility] provider list and order provided at bedside). Sierra Upton LCSW    Provided Post Acute Provider List?: Yes  Post Acute Provider List: Outpatient Therapy    Selected Continued Care - Admitted Since 3/19/2024       Destination    No services have been selected for the patient.                Durable Medical Equipment Coordination complete.      Service Provider Selected Services Address Phone Fax Patient Preferred    [facility] [address] [address] [phone] --              Dialysis/Infusion    No services have been selected for the patient.                Home Medical Care    No services have been selected for the patient.                Therapy    No services have been selected for the patient.                Community Resources    No services have been selected for the patient.                Community & DME    No services have been selected for the patient.                    Transportation Services  Private: Car    Final Discharge Disposition Code: 01 - home or self-care

## 2024-03-21 NOTE — DISCHARGE PLACEMENT REQUEST
"Kimberlee Juan PAVON \"Harjit\" (44 y.o. Male)       Date of Birth   1980    Social Security Number       Address   [address] [place] [address]    Home Phone   [phone]    MRN   [id number]       [place]   Zoroastrianism    Marital Status                               Admission Date   3/19/24    Admission Type   Emergency    Admitting Provider   Celeste Ramirez MD    Attending Provider   Sachin Johns MD    Department, Room/Bed   [address], P576/1       Discharge Date       Discharge Disposition   Home or Self Care    Discharge Destination                                 Attending Provider: Sachin Johns MD    Allergies: Codeine, Keflex [Cephalexin], Penicillins    Isolation: None   Infection: None   Code Status: CPR    Ht: 182.9 cm (72\")   Wt: 113 kg (248 lb 3.8 oz)    Admission Cmt: None   Principal Problem: Low back pain [M54.50]                   Active Insurance as of 3/19/2024       Primary Coverage       Payor Plan Insurance Group Employer/Plan Group    [id number]       Payor Plan Address Payor Plan Phone Number Payor Plan Fax Number Effective Dates    [address]   7/17/2022 - None Entered    [facility] [id number]         Subscriber Name Subscriber Birth Date Member ID       JUAN SARAVIA 1980 [id number]                     Emergency Contacts        (Rel.) Home Phone Work Phone Mobile Phone    ELVIA SARAVIACY (Spouse) -- -- [phone]                "

## 2024-03-21 NOTE — H&P
"CHIEF COMPLAINT: Back and leg pain      HISTORY OF PRESENT ILLNESS:  Admitted to the hospital with intractable back and leg pain.  MRI shows degenerative disease and a disc protrusion at L4.  We were consulted to perform an epidural steroid injection    PAST MEDICAL HISTORY:  No current facility-administered medications on file prior to encounter.     Current Outpatient Medications on File Prior to Encounter   Medication Sig Dispense Refill    hydroCHLOROthiazide (HYDRODIURIL) 25 MG tablet TAKE 1 TABLET BY MOUTH EVERY DAY 90 tablet 1    omeprazole (priLOSEC) 40 MG capsule Take 1 capsule by mouth As Needed.      amLODIPine (NORVASC) 5 MG tablet Take 1.5 tablets by mouth Daily. 135 tablet 1    metoprolol succinate XL (TOPROL-XL) 50 MG 24 hr tablet Take 1 tablet by mouth Daily. 90 tablet 1    naproxen (NAPROSYN) 500 MG tablet Take 1 tablet by mouth.         Past Medical History:   Diagnosis Date    Cyst of prostate     NO PROBLEMS CURRENTLY    Diverticulosis     Hernia, inguinal     right    Hypertension          SOCIAL HISTORY:  No tobacco    REVIEW OF SYSTEMS:  No hematologic infectious or constitutional symptoms  Other review of systems non-contributory  Negative screen for RAUDEL      PHYSICAL EXAM:  /94 (BP Location: Right arm, Patient Position: Sitting)   Pulse 82   Temp 36.6 °C (97.9 °F) (Oral)   Resp 16   Ht 182.9 cm (72\")   Wt 113 kg (248 lb 3.8 oz)   SpO2 95%   BMI 33.67 kg/m²   Well-developed well-nourished no acute distress  Extra ocular movements intact  Mallampati class 2 airway  Alert and oriented ×3      DIAGNOSIS:  Post-Op Diagnosis Codes:     * Lumbar radiculopathy [M54.16]    PLAN:  1.  Lumbar 4 epidural steroid injections, up to 3. If pain control is acceptable after 1 or 2 injections, it was discussed with the patient that they may return for the subsequent injections if and when their pain returns.  The risks were discussed with the patient including failure of relief, worsening pain, " Headache (post dural puncture headache), bleeding (epidural hematoma) and infection (epidural abscess or skin infection).  2.  Physical therapy exercises at home as prescribed by physical therapy or from the pain clinic handout.  Continuation of these exercises every day, or multiple times per week, even when the patient has good pain relief, was stressed to the patient as a preventative measure to decrease the frequency and severity of future pain episodes.  3.  Continue pain medicines as already prescribed.  If patient not currently taking any, it is recommended to begin Acetaminophen 1000 mg po q 8 hours.  If other medicines containing Acetaminophen are currently prescribed, maintain daily dose at 3000 mg.    4.  If they can tolerate NSAIDS, it is recommended to take Ibuprofen 600 mg po q 6 hours for 7 days during pain exacerbations.  Alternatively, they may substitute an NSAID of their choice (e.g. Aleve).  This may be taken at the same time as Acetaminophen.  5.  Heat and ice to the affected area as tolerated for pain control.    6.  Daily low impact exercise such as walking or water exercise was recommended to maintain overall health and aid in weight control.   7.  Follow up as needed for subsequent injections.

## 2024-03-21 NOTE — OUTREACH NOTE
Prep Survey      Flowsheet Row Responses   [facility] patient discharged from? [place]   Is LACE score < 7 ? Yes   Eligibility [facility]   Date of Admission 03/19/24   Date of Discharge 03/21/24   Discharge Disposition Home or Self Care   Discharge diagnosis Low back pain   Does the patient have one of the following disease processes/diagnoses(primary or secondary)? Other   Does the patient have Home health ordered? No   Is there a DME ordered? Yes   What DME was ordered? walker from [facility]   Prep survey completed? Yes            Niesha SINGH - Registered Nurse

## 2024-03-21 NOTE — ANESTHESIA PROCEDURE NOTES
PAIN Epidural block      Patient reassessed immediately prior to procedure    Patient location during procedure: pain clinic  Indication:procedure for pain  Performed By  Anesthesiologist: Carrington Marshall MD  Preanesthetic Checklist  Completed: patient identified and risks and benefits discussed  Additional Notes  Diagnosis:     Post-Op Diagnosis Codes:     * Lumbar radiculopathy [M54.16]    Sedation:  none    Sedation time:    A lumbar epidural steroid injection with fluoroscopic guidance was performed.  Under fluoroscopic guidance, the epidural space was identified and accessed, confirmed by loss of resistance to saline.  The above medications were injected uneventfully.    Prep:  Pt Position:prone  Sterile Tech:cap, gloves, mask and sterile barrier  Prep:chlorhexidine gluconate and isopropyl alcohol  Monitoring:blood pressure monitoring, continuous pulse oximetry and EKG  Procedure:Sedation: no     Approach:left paramedian  Guidance: fluoroscopy  Location:lumbar  Level:4-5  Needle Type:Tuohy  Needle Gauge:20  Aspiration:negative  Medications:  Preservative Free Saline:1mL  Comments:Depomedrol 80 mg  Post Assessment:  Pt Tolerance:patient tolerated the procedure well with no apparent complications  Complications:no

## 2024-03-21 NOTE — PROGRESS NOTES
Discharge Planning Assessment  [facility]     Patient Name: Juan Mohan  MRN: [id number]  Today's Date: 3/21/2024    Admit Date: 3/19/2024    Plan: Home with spouse, walker via [facility] and outpatient therapy   Discharge Needs Assessment       Row Name 03/21/24 1320       Living Environment    People in Home spouse    Name(s) of People in Home Hermilo Mercado [phone]    Current Living Arrangements home    Primary Care Provided by self    Provides Primary Care For no one    Family Caregiver if Needed spouse    Quality of Family Relationships helpful;involved;supportive    Able to Return to Prior Arrangements yes       Resource/Environmental Concerns    Resource/Environmental Concerns none       Transition Planning    Patient/Family Anticipates Transition to home with family    Patient/Family Anticipated Services at Transition outpatient care;rehabilitation services;durable medical equipment    Transportation Anticipated family or friend will provide       Discharge Needs Assessment    Equipment Currently Used at Home none    Concerns to be Addressed discharge planning    Equipment Needed After Discharge walker, rolling    Outpatient/Agency/Support Group Needs outpatient therapy    Discharge Facility/Level of Care Needs outpatient therapy    Provided Post Acute Provider List? Yes    Post Acute Provider List Outpatient Therapy    Discharge Coordination/Progress OP therapy                   Discharge Plan       Row Name 03/21/24 1322       Plan    Plan Home with spouse, walker via [facility] and outpatient therapy    Patient/Family in Agreement with Plan yes    Plan Comments CCP met with pt to discuss d/c planning. Pt resides with his spouse, uses no DME, and has no h/o HH/SNF. PT recommends outpatient therapy at d/c, order and list of [facility] providers given to pt at bedside. Referral for walker order placed to [facility] to arrange. CCP to follow for additional needs. Sierra Upton LCSW                  Continued Care  and Services - Admitted Since 3/19/2024       Durable Medical Equipment       Service Provider Request Status Selected Services Address Phone Fax Patient Preferred    [facility] Pending - Request Sent N/A [phone], [place] [id number] [phone] --                  Expected Discharge Date and Time       Expected Discharge Date Expected Discharge Time    Mar 21, 2024            Demographic Summary       Row Name 03/21/24 1319       General Information    Admission Type observation    Arrived From home    Referral Source admission list    Reason for Consult discharge planning    Preferred Language English                   Functional Status       Row Name 03/21/24 1319       Functional Status    Usual Activity Tolerance good    Current Activity Tolerance good       Functional Status, IADL    Medications independent    Meal Preparation independent    Housekeeping independent    Laundry independent    Shopping independent       Mental Status Summary    Recent Changes in Mental Status/Cognitive Functioning no changes                   Psychosocial    No documentation.                  Abuse/Neglect    No documentation.                  Legal    No documentation.                  Substance Abuse    No documentation.                  Patient Forms    No documentation.                     Jaimee Upton LCSW

## 2024-03-21 NOTE — DISCHARGE SUMMARY
Patient Name: Juan Mohan  : 1980  MRN: [id number]    Date of Admission: 3/19/2024  Date of Discharge:  3/21/2024  Primary Care Physician: Josy Vinson APRN      Chief Complaint:   Back Pain      Discharge Diagnoses     Active Hospital Problems    Diagnosis  POA    **Low back pain [M54.50]  Yes    Hyperglycemia [R73.9]  Unknown    Acid reflux [K21.9]  Yes    Essential hypertension [I10]  Yes      Resolved Hospital Problems   No resolved problems to display.        Hospital Course   Patient is a 44-year-old male with a history of hypertension, GERD, presented to the ED complaining of back pain started 2 days ago.  Patient reports he was laying in bed when the pain started, located in lower back.denies any specific injury, denies any strenuous exercise, picking up/working with heavy objects.  Does not radiate to the legs but stays in the mid low back and does radiates to the bilateral hips intermittently.  Left side seems to be worse than right.  Describes pain as sharp, constant, worse with movement better at rest.  Denies urinary or bowel incontinence.  Patient was seen at [facility] earlier today, x-rays of the lumbar spine were obtained which showed disc narrowing but no acute fracture.  Patient was discharged on as needed pain meds, muscle relaxants, however pain continued to worsen so presented back to the ED.         1.Low back pain with mild radiation to his anterior thighs, did undergo MRI of the lumbar spine and at the L4-5 level there was mild left and mild to moderate right facet overgrowth.  There is a mild disc space narrowing and posterior annular fissure or tear with a small posterior central disc protrusion was noted.  Neurosurgery was contacted and patient underwent epidural today.  He is requesting to be discharged home and he will be on a Lasix upon discharge.  Of note activity instructions as per neurosurgery recommendations.    2.  Hyperglycemia,  hemoglobin A1c is 5.10.    3.  Hypertension, continue with home metoprolol, amlodipine, HCTZ.    4.  GERD, on acid suppressive therapy.      Copied text on this note has been reviewed by me on 3/21/2024    Day of Discharge         Physical Exam:  Temp:  [97.3 °F (36.3 °C)-98.2 °F (36.8 °C)] 97.9 °F (36.6 °C)  Heart Rate:  [61-83] 78  Resp:  [16] 16  BP: (121-140)/() 126/103  Body mass index is 33.67 kg/m².  Physical Exam  HEENT: PERRLA, extraocular movements intact, Scleras no icterus  Neck: Supple, no JVD  Cardiovascular: Regular rate and rhythm with normal S1-S2  Respiratory: Fairly clear to auscultation bilaterally with no wheezes  GI: Soft, nontender, bowel sounds are present  Extremities: No edema, palpable pulses, good strength in bilateral lower extremities  Neurologic: Grossly nonfocal, no facial asymmetry      Consultants     Consult Orders (all) (From admission, onward)       Start     Ordered    03/21/24 1122  Inpatient Anesthesia Pain Management Clinic Consult  Once        Specialty:  Pain Medicine  Provider:  (Not yet assigned)    03/21/24 1122    03/21/24 0921  Inpatient Neurosurgery Consult  Once        Specialty:  Neurosurgery  Provider:  Efra Castaneda MD    03/21/24 0920 03/19/24 2127  [facility] (on-call MD unless specified) Details  Once        Specialty:  Hospitalist  Provider:  Celeste Ramirez MD    03/19/24 2126                  Procedures     * Surgery not found *    Imaging Results (All)       Procedure Component Value Units Date/Time    MRI Lumbar Spine With & Without Contrast [[id number]] Collected: 03/20/24 1827     Updated: 03/21/24 1314    Narrative:      MRI OF THE LUMBAR SPINE WITH AND WITHOUT CONTRAST ON 03/20/2024     CLINICAL HISTORY: Patient has low back pain for 2 days.     TECHNIQUE: Sagittal T1, proton density and fat-suppressed T2 and  postcontrast sagittal fat-suppressed T1-weighted images were obtained of  the lumbar spine. In addition axial T2 weighted images were  obtained  from L1-S2 and thin-cut pre and postcontrast axial T1-weighted images  were obtained angled through the interspaces from L3-S1.      COMPARISON: There are no prior lumbar spine imaging studies from [facility] for comparison.     FINDINGS: The distal thoracic cord and the conus is normal in signal  intensity. The conus terminates at the horizontal level of L1-2  interspace which is normal     At T12-L1, the disc space and facets are normal in appearance with no  canal or foraminal narrowing.     At L1-2, the disc space and facets are normal in appearance with no  canal or foraminal narrowing.     At L2-3, the disc space and facets are normal in appearance with no  canal or foraminal narrowing.     At L3-4, the disc space and facets are normal in appearance with no  canal or foraminal narrowing.     At L4-5, there is mild diffuse disc desiccation, minimal disc space  narrowing. There is some T2 high signal and some enhancement of the  posterior annulus compatible with posterior annular fissure or tear with  small posterior central disc protrusion that indents the anterior  midline of the thecal sac, mildly narrows the thecal sac, comes close to  the ventral aspect of the traversing L5 nerve roots but does not abut  them. There is mild to moderate right and mild left facet overgrowth and  facet spurs extend into the posterior aspect of the neural foramina.  There is mild bulging disc material into the inferior aspect of the  foramina and there is mild to moderate bilateral foraminal narrowing  which could potentially affect the exiting L4 nerve roots.     At L5-S1 the disc space and facets are normal in appearance with no  canal or foraminal narrowing.       Impression:      1. The disc spaces and facets are normal in appearance with no canal or  foraminal narrowing at T12-L1, L1-L2, L2-L3, L3-L4 and L5-S1.     2. The pathology in the lumbar spine is isolated to the L4-5 level  where  there is mild left and mild-moderate right facet overgrowth. There is  mild disc space narrowing, diffuse disc desiccation and a posterior  annular fissure or tear with a small posterior central disc protrusion  that mildly indents the anterior midline of the thecal sac mildly  narrowing the thecal sac. Furthermore, there is some facet spurring into  the posterior foramina, mild bulging disc material into the inferior  aspect of the foramina and there is at least mild-moderate up to  moderate bilateral foraminal narrowing that could affect the exiting L4  nerve roots. The remainder of the lumbar spine MRI is unremarkable.      The results were communicated to Dr. Sachin Johns the hospitalist taking  care of the patient by telephone on 03/20/2024 at 5:30 PM.      This report was finalized on 3/21/2024 1:11 PM by Dr. Miki Miller M.D  on Workstation: [id number]       [place] Guided Pain Management Spine [[id number]] Resulted: 03/21/24 1241     Updated: 03/21/24 1241    Narrative:      This procedure was auto-finalized with no dictation required.            Results for orders placed during the hospital encounter of 02/24/21    Adult Stress Echo W/ Cont or Stress Agent if Necessary Per Protocol    Interpretation Summary  · Calculated left ventricular EF = 56% Estimated left ventricular EF = 56% Estimated left ventricular EF was in agreement with the calculated left ventricular EF. Left ventricular systolic function is normal. Normal global longitudinal LV strain (GLS) = -19.6%. Left ventricle strain data was reviewed by the physician and found to be accurate. Normal left ventricular cavity size and wall thickness noted. All left ventricular wall segments contract normally. Left ventricular diastolic function was normal.  · No aortic valve regurgitation or stenosis is present. The aortic valve is abnormal in structure. There is mild calcification of the aortic valve mainly affecting the right coronary cusp(s).  · Trace  "mitral valve regurgitation is present.  · Trace tricuspid valve regurgitation is present. Estimated right ventricular systolic pressure from tricuspid regurgitation is normal (<35 mmHg). Calculated right ventricular systolic pressure from tricuspid regurgitation is 21 mmHg.  · Findings consistent with a normal ECG stress test.  · Normal stress echo with no significant echocardiographic evidence for myocardial ischemia.    Pertinent Labs     Results from last 7 days   Lab Units 03/20/24  0633 03/19/24  2241   WBC 10*3/mm3 5.22 5.92   HEMOGLOBIN g/dL 15.1 14.6   PLATELETS 10*3/mm3 204 208     Results from last 7 days   Lab Units 03/20/24  0633 03/19/24  2241   SODIUM mmol/L 139 141   POTASSIUM mmol/L 4.0 3.6   CHLORIDE mmol/L 107 106   CO2 mmol/L 16.5* 27.0   BUN mg/dL 15 19   CREATININE mg/dL 0.96 1.14   GLUCOSE mg/dL 92 169*   EGFR mL/min/1.73 101.2 82.3       Results from last 7 days   Lab Units 03/20/24  0633 03/19/24  2241   CALCIUM mg/dL 8.8 9.2   MAGNESIUM mg/dL 3.2*  --    PHOSPHORUS mg/dL 3.4  --                Invalid input(s): \"LDLCALC\"          Test Results Pending at Discharge       Discharge Details        Discharge Medications        New Medications        Instructions Start Date   oxyCODONE-acetaminophen 7.5-325 MG per tablet  Commonly known as: PERCOCET   1 tablet, Oral, Every 6 Hours PRN             Continue These Medications        Instructions Start Date   amLODIPine 5 MG tablet  Commonly known as: NORVASC   7.5 mg, Oral, Daily      hydroCHLOROthiazide 25 MG tablet   25 mg, Oral, Daily      metoprolol succinate XL 50 MG 24 hr tablet  Commonly known as: TOPROL-XL   50 mg, Oral, Daily      naproxen 500 MG tablet  Commonly known as: NAPROSYN   500 mg, Oral      omeprazole 40 MG capsule  Commonly known as: priLOSEC   Take 1 capsule by mouth As Needed.               Allergies   Allergen Reactions    Codeine Anaphylaxis    Keflex [Cephalexin] Anaphylaxis    Penicillins Anaphylaxis       Discharge " Disposition:  Home or Self Care      Discharge Diet:  Diet Order   Procedures    Diet: Cardiac; Healthy Heart (2-3 Na+); Fluid Consistency: Thin (IDDSI 0)    NPO Diet NPO Type: Sips with Meds       Discharge Activity:   Activity Instructions       Other Activity Instructions      Activity Instructions: As recommended by neurosurgery            CODE STATUS:    Code Status and Medical Interventions:   Ordered at: 03/20/24 0033     Level Of Support Discussed With:    Patient     Code Status (Patient has no pulse and is not breathing):    CPR (Attempt to Resuscitate)     Medical Interventions (Patient has pulse or is breathing):    Full Support       Future Appointments   Date Time Provider Department Center   4/12/2024  9:15 AM Josy Vinson APRN [facility]     Additional Instructions for the Follow-ups that You Need to Schedule       Ambulatory Referral to Physical Therapy Evaluate and treat   As directed      Specialty needed: Evaluate and treat   Follow-up needed: Yes        Discharge Follow-up with Specified Provider: ; 2 Weeks   As directed      To:    Follow Up: 2 Weeks               Follow-up Information       Josy Vinson APRN .    Specialties: Nurse Practitioner, Family Medicine  Contact information:  [address] [place] [id number] [phone]                             Additional Instructions for the Follow-ups that You Need to Schedule       Ambulatory Referral to Physical Therapy Evaluate and treat   As directed      Specialty needed: Evaluate and treat   Follow-up needed: Yes        Discharge Follow-up with Specified Provider: ; 2 Weeks   As directed      To:    Follow Up: 2 Weeks            Time Spent on Discharge:  Greater than 30 minutes      Sachin Johns MD  [facility]  03/21/24  15:21 EDT

## 2024-03-21 NOTE — NURSING NOTE
Discharge orders received. Patient received epidural today and was cleared to go home. Medication delivered to patients room. IV dc'd. Walker delivered to patients room. Patient home with family. Patient verbalized understanding of all discharge orders and appointments.

## 2024-03-21 NOTE — PLAN OF CARE
Goal Outcome Evaluation:              Outcome Evaluation: VSS. Pt reports pain is improving but still hurts with some activity. No other complaints at this time.

## 2024-03-21 NOTE — PLAN OF CARE
Goal Outcome Evaluation:  Plan of Care Reviewed With: patient        Progress: improving  Outcome Evaluation: Pt agreeable to therapy this date. Pt UIC upon arrival. STS from chair performed with SBA and rwx. Pt continues to require extra time with all movement secondary to guarding to avoid increase in back pain. Pt ambulated 400' with CGA and rwx this date. No reports of increase in back pain with ambulation. Pt returned to chair at conclusion of session. Continued skilled therapy needed to address deficits in functional mobility. Continue to progress as indicated. D/C to home with outpatient services is recommended.      Anticipated Discharge Disposition (PT): home with outpatient therapy services

## 2024-03-21 NOTE — PLAN OF CARE
Problem: Adult Inpatient Plan of Care  Goal: Absence of Hospital-Acquired Illness or Injury  Intervention: Identify and Manage Fall Risk  Recent Flowsheet Documentation  Taken 3/21/2024 1400 by Marya Gordillo RN  Safety Promotion/Fall Prevention: safety round/check completed  Taken 3/21/2024 1200 by Marya Gordillo RN  Safety Promotion/Fall Prevention: safety round/check completed  Taken 3/21/2024 1000 by Marya Gordillo RN  Safety Promotion/Fall Prevention: safety round/check completed  Taken 3/21/2024 0800 by Marya Gordillo RN  Safety Promotion/Fall Prevention: safety round/check completed  Intervention: Prevent Skin Injury  Recent Flowsheet Documentation  Taken 3/21/2024 0800 by Marya Gordillo RN  Skin Protection: adhesive use limited     Problem: Hypertension Comorbidity  Goal: Blood Pressure in Desired Range  Intervention: Maintain Blood Pressure Management  Recent Flowsheet Documentation  Taken 3/21/2024 0800 by Marya Gordillo RN  Medication Review/Management: medications reviewed     Problem: Pain Chronic (Persistent) (Comorbidity Management)  Goal: Acceptable Pain Control and Functional Ability  Intervention: Manage Persistent Pain  Recent Flowsheet Documentation  Taken 3/21/2024 0800 by Marya Gordillo RN  Medication Review/Management: medications reviewed     Problem: Fall Injury Risk  Goal: Absence of Fall and Fall-Related Injury  Intervention: Identify and Manage Contributors  Recent Flowsheet Documentation  Taken 3/21/2024 0800 by Marya Gordillo RN  Medication Review/Management: medications reviewed  Intervention: Promote Injury-Free Environment  Recent Flowsheet Documentation  Taken 3/21/2024 1400 by Marya Gordillo RN  Safety Promotion/Fall Prevention: safety round/check completed  Taken 3/21/2024 1200 by Marya Gordillo RN  Safety Promotion/Fall Prevention: safety round/check completed  Taken 3/21/2024 1000 by Marya Gordillo RN  Safety Promotion/Fall Prevention: safety round/check completed  Taken 3/21/2024 0800 by Duy  CORAL Malhotra  Safety Promotion/Fall Prevention: safety round/check completed   Goal Outcome Evaluation:

## 2024-03-21 NOTE — CONSULTS
[facility] NEUROSURGERY CONSULT NOTE    Patient name: Juan Mohan  Referring Provider: Dr Johns  Reason for Consultation: Low back pain    Patient Care Team:  Josy Vinson APRN as PCP - General (Nurse Practitioner)  Abelino Davison MD as Consulting Physician (General Surgery)    Chief complaint: Low back pain    Subjective .     History of present illness:    Patient is a 44 y.o.  male with a past medical history of HTN.  Patient presented to the emergency room on 3/19/2024 with complaint of acute low back pain.  He reports his pain started Monday of this week.  On Tuesday he reports the pain was so bad that it was difficult for him to get out of bed.  He was seen at an outlying ER had lumbar x-rays that noted mild disc space narrowing at L4-5 and L5-S1.  He reports that after he got home the pain worsened and therefore he presented to [facility] emergency room.  He states the pain is constant and sharp in nature.  No injury or trauma leading up to this pain.  He is in the [employer] and has a fairly labor-intensive job.  He denies previous episodes of back pain like this in the past.  He reports that the pain will occasionally radiate down the lateral aspect of his left thigh.  He denies numbness or tingling to his lower extremities, lower extremity weakness, loss of bowel or bladder function, saddle paresthesia.  He has had no previous spine surgeries, no history of cancer and is not anticoagulated.      SOCIAL HISTORY  Social History     Tobacco Use    Smoking status: Former     Current packs/day: 0.00     Average packs/day: 0.5 packs/day for 10.0 years (5.0 ttl pk-yrs)     Types: Cigarettes     Start date: 1/3/2010     Quit date: 1/3/2020     Years since quittin.2    Smokeless tobacco: Never   Vaping Use    Vaping status: Never Used   Substance Use Topics    Alcohol use: Yes     Comment: occ    Drug use: Never       full time job in the [employer]    History  PAST MEDICAL HISTORY  Past  Medical History:   Diagnosis Date    Cyst of prostate     NO PROBLEMS CURRENTLY    Diverticulosis     Hernia, inguinal     right    Hypertension        PAST SURGICAL HISTORY  Past Surgical History:   Procedure Laterality Date    APPENDECTOMY      CLOSED REDUCTION Left     left arm    COLONOSCOPY      INGUINAL HERNIA REPAIR Right 2/28/2022    Procedure: INGUINAL HERNIA REPAIR LAPAROSCOPIC WITH DAVINCI ROBOT;  Surgeon: Abelino Davison MD;  Location: [facility] OR;  Service: Robotics - DaVinci;  Laterality: Right;       FAMILY HISTORY  Family History   Problem Relation Age of Onset    Hypertension Mother     Stroke Father     Hypertension Brother     Heart attack Paternal Grandmother     Heart disease Paternal Grandmother     Diabetes Paternal Grandmother     Cancer Paternal Grandfather         liver    Malig Hyperthermia Neg Hx        Allergies:  Codeine, Keflex [cephalexin], and Penicillins    MEDICATIONS:  Medications Prior to Admission   Medication Sig Dispense Refill Last Dose    hydroCHLOROthiazide (HYDRODIURIL) 25 MG tablet TAKE 1 TABLET BY MOUTH EVERY DAY 90 tablet 1 3/18/2024    omeprazole (priLOSEC) 40 MG capsule Take 1 capsule by mouth As Needed.   Past Month    amLODIPine (NORVASC) 5 MG tablet Take 1.5 tablets by mouth Daily. 135 tablet 1     metoprolol succinate XL (TOPROL-XL) 50 MG 24 hr tablet Take 1 tablet by mouth Daily. 90 tablet 1 3/18/2024    naproxen (NAPROSYN) 500 MG tablet Take 1 tablet by mouth.   Unknown       Current Facility-Administered Medications:     amLODIPine (NORVASC) tablet 7.5 mg, 7.5 mg, Oral, Daily, Celeste Ramirez MD, 7.5 mg at 03/21/24 0825    sennosides-docusate (PERICOLACE) 8.6-50 MG per tablet 2 tablet, 2 tablet, Oral, BID, 2 tablet at 03/21/24 0824 **AND** polyethylene glycol (MIRALAX) packet 17 g, 17 g, Oral, Daily PRN, 17 g at 03/21/24 0829 **AND** bisacodyl (DULCOLAX) EC tablet 5 mg, 5 mg, Oral, Daily PRN **AND** bisacodyl (DULCOLAX) suppository 10 mg, 10 mg,  Rectal, Daily PRN, Celeste Ramirez MD    hydroCHLOROthiazide tablet 25 mg, 25 mg, Oral, Daily, Celeste Ramirez MD, 25 mg at 03/21/24 0824    HYDROmorphone (DILAUDID) injection 1 mg, 1 mg, Intravenous, Q2H PRN, Celeste Ramirez MD    Lidocaine 4 % 1 patch, 1 patch, Transdermal, Q24H, Celeste Ramirez MD, 1 patch at 03/21/24 0825    metoprolol succinate XL (TOPROL-XL) 24 hr tablet 50 mg, 50 mg, Oral, Daily, Celeste Ramirez MD, 50 mg at 03/21/24 0824    oxyCODONE-acetaminophen (PERCOCET) 7.5-325 MG per tablet 1 tablet, 1 tablet, Oral, Q4H PRN, Celeste Ramirez MD, 1 tablet at 03/21/24 0428    pantoprazole (PROTONIX) EC tablet 40 mg, 40 mg, Oral, Q AM, Celeste Ramirez MD, 40 mg at 03/21/24 0608    predniSONE (DELTASONE) tablet 40 mg, 40 mg, Oral, Daily With Breakfast, Celeste Ramirez MD, 40 mg at 03/21/24 0825    [COMPLETED] Insert Peripheral IV, , , Once **AND** sodium chloride 0.9 % flush 10 mL, 10 mL, Intravenous, PRN, Celeste Ramirez MD    sodium chloride 0.9 % flush 10 mL, 10 mL, Intravenous, Q12H, Celeste Ramirez MD, 10 mL at 03/21/24 0830    sodium chloride 0.9 % flush 10 mL, 10 mL, Intravenous, PRN, Celeste Ramirez MD    sodium chloride 0.9 % infusion 40 mL, 40 mL, Intravenous, PRN, Celeste Ramirez MD    Review of Systems  Review of Systems   Constitutional:  Negative for chills and fever.   Gastrointestinal:  Negative for nausea and vomiting.        No bowel incontinence   Genitourinary:  Negative for difficulty urinating.        No bladder incontinence   Musculoskeletal:  Positive for back pain. Negative for neck pain.   Neurological:  Negative for weakness and numbness.   Psychiatric/Behavioral:  The patient is not nervous/anxious.        Objective     Physical Exam:  Physical Exam  Vitals reviewed.   Pulmonary:      Effort: Pulmonary effort is normal.   Musculoskeletal:      Cervical back: Normal.      Thoracic back: Normal.      Lumbar back: Bony  tenderness (low lumbar) present. Negative right straight leg raise test and negative left straight leg raise test.   Skin:     General: Skin is warm and dry.   Neurological:      Mental Status: He is alert and oriented to person, place, and time.      Motor: Motor strength is normal.     Deep Tendon Reflexes:      Reflex Scores:       Tricep reflexes are 2+ on the right side and 2+ on the left side.       Bicep reflexes are 2+ on the right side and 2+ on the left side.       Brachioradialis reflexes are 2+ on the right side and 2+ on the left side.       Patellar reflexes are 2+ on the right side and 2+ on the left side.       Achilles reflexes are 2+ on the right side and 2+ on the left side.  Psychiatric:         Speech: Speech normal.       Neurologic Exam     Mental Status   Oriented to person, place, and time.   Speech: speech is normal   Level of consciousness: alert  Knowledge: good.     Motor Exam   Muscle bulk: normal  Overall muscle tone: normal    Strength   Strength 5/5 throughout.     Sensory Exam   Light touch normal.     Gait, Coordination, and Reflexes     Reflexes   Right brachioradialis: 2+  Left brachioradialis: 2+  Right biceps: 2+  Left biceps: 2+  Right triceps: 2+  Left triceps: 2+  Right patellar: 2+  Left patellar: 2+  Right achilles: 2+  Left achilles: 2+  Right Reese: absent  Left Reese: absent  Right ankle clonus: absent  Left ankle clonus: absent  Gait not tested d/t pain         Results Review:  LABS:    Admission on 03/19/2024   Component Date Value Ref Range Status    Glucose 03/19/2024 169 (H)  65 - 99 mg/dL Final    BUN 03/19/2024 19  6 - 20 mg/dL Final    Creatinine 03/19/2024 1.14  0.76 - 1.27 mg/dL Final    Sodium 03/19/2024 141  136 - 145 mmol/L Final    Potassium 03/19/2024 3.6  3.5 - 5.2 mmol/L Final    Chloride 03/19/2024 106  98 - 107 mmol/L Final    CO2 03/19/2024 27.0  22.0 - 29.0 mmol/L Final    Calcium 03/19/2024 9.2  8.6 - 10.5 mg/dL Final    BUN/Creatinine Ratio  03/19/2024 16.7  7.0 - 25.0 Final    Anion Gap 03/19/2024 8.0  5.0 - 15.0 mmol/L Final    eGFR 03/19/2024 82.3  >60.0 mL/min/1.73 Final    WBC 03/19/2024 5.92  3.40 - 10.80 10*3/mm3 Final    RBC 03/19/2024 4.72  4.14 - 5.80 10*6/mm3 Final    Hemoglobin 03/19/2024 14.6  13.0 - 17.7 g/dL Final    Hematocrit 03/19/2024 42.6  37.5 - 51.0 % Final    MCV 03/19/2024 90.3  79.0 - 97.0 fL Final    MCH 03/19/2024 30.9  26.6 - 33.0 pg Final    MCHC 03/19/2024 34.3  31.5 - 35.7 g/dL Final    RDW 03/19/2024 12.2 (L)  12.3 - 15.4 % Final    RDW-SD 03/19/2024 39.9  37.0 - 54.0 fl Final    MPV 03/19/2024 10.3  6.0 - 12.0 fL Final    Platelets 03/19/2024 208  140 - 450 10*3/mm3 Final    Neutrophil % 03/19/2024 49.8  42.7 - 76.0 % Final    Lymphocyte % 03/19/2024 35.8  19.6 - 45.3 % Final    Monocyte % 03/19/2024 11.0  5.0 - 12.0 % Final    Eosinophil % 03/19/2024 2.2  0.3 - 6.2 % Final    Basophil % 03/19/2024 1.0  0.0 - 1.5 % Final    Immature Grans % 03/19/2024 0.2  0.0 - 0.5 % Final    Neutrophils, Absolute 03/19/2024 2.95  1.70 - 7.00 10*3/mm3 Final    Lymphocytes, Absolute 03/19/2024 2.12  0.70 - 3.10 10*3/mm3 Final    Monocytes, Absolute 03/19/2024 0.65  0.10 - 0.90 10*3/mm3 Final    Eosinophils, Absolute 03/19/2024 0.13  0.00 - 0.40 10*3/mm3 Final    Basophils, Absolute 03/19/2024 0.06  0.00 - 0.20 10*3/mm3 Final    Immature Grans, Absolute 03/19/2024 0.01  0.00 - 0.05 10*3/mm3 Final    nRBC 03/19/2024 0.0  0.0 - 0.2 /100 WBC Final    Glucose 03/20/2024 92  65 - 99 mg/dL Final    BUN 03/20/2024 15  6 - 20 mg/dL Final    Creatinine 03/20/2024 0.96  0.76 - 1.27 mg/dL Final    Sodium 03/20/2024 139  136 - 145 mmol/L Final    Potassium 03/20/2024 4.0  3.5 - 5.2 mmol/L Final    Chloride 03/20/2024 107  98 - 107 mmol/L Final    CO2 03/20/2024 16.5 (L)  22.0 - 29.0 mmol/L Final    Calcium 03/20/2024 8.8  8.6 - 10.5 mg/dL Final    BUN/Creatinine Ratio 03/20/2024 15.6  7.0 - 25.0 Final    Anion Gap 03/20/2024 15.5 (H)  5.0 - 15.0  mmol/L Final    eGFR 03/20/2024 101.2  >60.0 mL/min/1.73 Final    WBC 03/20/2024 5.22  3.40 - 10.80 10*3/mm3 Final    RBC 03/20/2024 4.84  4.14 - 5.80 10*6/mm3 Final    Hemoglobin 03/20/2024 15.1  13.0 - 17.7 g/dL Final    Hematocrit 03/20/2024 43.2  37.5 - 51.0 % Final    MCV 03/20/2024 89.3  79.0 - 97.0 fL Final    MCH 03/20/2024 31.2  26.6 - 33.0 pg Final    MCHC 03/20/2024 35.0  31.5 - 35.7 g/dL Final    RDW 03/20/2024 12.3  12.3 - 15.4 % Final    RDW-SD 03/20/2024 39.7  37.0 - 54.0 fl Final    MPV 03/20/2024 10.5  6.0 - 12.0 fL Final    Platelets 03/20/2024 204  140 - 450 10*3/mm3 Final    Magnesium 03/20/2024 3.2 (H)  1.6 - 2.6 mg/dL Final    Phosphorus 03/20/2024 3.4  2.5 - 4.5 mg/dL Final    Hemoglobin A1C 03/20/2024 5.10  4.80 - 5.60 % Final       DIAGNOSTICS:  MRI lumbar spine: Normal disc spaces and facets with no canal or foraminal narrowing at T12-L1, L1-L2, L2-L3, L3-L4, L5-S1.  At L4-5 there is mild left and mild to moderate right facet overgrowth.  Mild disc space narrowing and posterior annular fissure tear.      Results Review:   I reviewed the patient's new clinical results.  I personally viewed the patient's MRI lumbar spine, it was also reviewed by and discussed with Dr Castaneda    Vital Signs   Temp:  [97.3 °F (36.3 °C)-98.2 °F (36.8 °C)] 97.5 °F (36.4 °C)  Heart Rate:  [61-89] 74  Resp:  [16-18] 16  BP: (121-131)/(76-90) 126/82      Assessment & Plan       Low back pain    Essential hypertension    Acid reflux    Hyperglycemia    44-year-old male with history of hypertension presented to the ER with complaint of low back pain since Monday of this week.  MRI lumbar spine which was fairly unremarkable was discussed with patient and wife.  Do not see any need for surgical intervention.  Patient has no SI joint tenderness.  Will plan for LESI tomorrow.  Explained to patient that there is a chance he could get immediate relief from this versus may need second injection in 3 months.  Recommend  "outpatient PT and office follow-up in 4 to 6 weeks.    PLAN:   -LESI  -N.p.o. after midnight    Addendum at 1450:  Patient was able to receive LESI today, has had some improvement in back pain.  If patient feels ready it is okay for discharge from neurosurgery standpoint.  He can follow-up in the office in about 4 to 6 weeks unless he develops new or worsening symptoms.    I discussed the patient's findings and my recommendations with patient, family, and Dr. Castaneda    During patient visit, I utilized appropriate personal protective equipment including gloves.  Appropriate PPE was worn during the entire visit.  Hand hygiene was completed before and after.     Beth Martinez, APRN  03/21/24  10:22 EDT    \"Dictated utilizing Dragon dictation\".      "

## 2024-03-21 NOTE — DISCHARGE INSTRUCTIONS

## 2024-03-21 NOTE — THERAPY TREATMENT NOTE
Patient Name: Juan Mohan  : 1980    MRN: [id number]                              Today's Date: 3/21/2024       Admit Date: 3/19/2024    Visit Dx:     ICD-10-CM ICD-9-CM   1. Acute midline low back pain without sciatica  M54.50 724.2   2. Uncontrolled pain  R52 780.96     Patient Active Problem List   Diagnosis    Abnormal EKG    Essential hypertension    Dyslipidemia    Constipation    Prostate irregularity    Spleen enlargement    Renal cyst    Inguinal hernia    Diverticulosis    Right shoulder pain    High cholesterol    Acid reflux    Fatty liver    Melena    Low back pain    Hyperglycemia     Past Medical History:   Diagnosis Date    Cyst of prostate     NO PROBLEMS CURRENTLY    Diverticulosis     Hernia, inguinal     right    Hypertension      Past Surgical History:   Procedure Laterality Date    APPENDECTOMY      CLOSED REDUCTION Left     left arm    COLONOSCOPY      INGUINAL HERNIA REPAIR Right 2022    Procedure: INGUINAL HERNIA REPAIR LAPAROSCOPIC WITH TenlegsINCI ROBOT;  Surgeon: Abelino Davison MD;  Location: [facility];  Service: Robotics - Immunomeinci;  Laterality: Right;      General Information       Row Name 24          Physical Therapy Time and Intention    Document Type therapy note (daily note)  -EM (r) JG (t) EM (c)     Mode of Treatment individual therapy;physical therapy  -EM (r) JG (t) EM (c)       Row Name 24 09          General Information    Patient Profile Reviewed yes  -EM (r) JG (t) EM (c)     Existing Precautions/Restrictions fall  -EM (r) JG (t) EM (c)       Row Name 24 09          Cognition    Orientation Status (Cognition) oriented x 4  -EM (r) JG (t) EM (c)       Row Name 24 09          Safety Issues, Functional Mobility    Impairments Affecting Function (Mobility) balance;endurance/activity tolerance;strength  -EM (r) JG (t) EM (c)               User Key  (r) = Recorded By, (t) = Taken By, (c) = Cosigned By      Initials Name  Provider Type    EM Ivania Morales, PT Physical Therapist    Fortunato Quevedo, PT Student PT Student                   Mobility       Row Name 03/21/24 0913          Bed Mobility    Comment, (Bed Mobility) [facility] upon arrival. Left in chair at end of session  -EM (r) JG (t) EM (c)       Row Name 03/21/24 0913          Sit-Stand Transfer    Sit-Stand [place] (Transfers) verbal cues;nonverbal cues (demo/gesture);standby assist  -EM (r) JG (t) EM (c)     Assistive Device (Sit-Stand Transfers) walker, front-wheeled  -EM (r) JG (t) EM (c)       Row Name 03/21/24 0913          Gait/Stairs (Locomotion)    Gait/Stairs Locomotion gait/ambulation assistive device  -EM (r) JG (t) EM (c)     [place] Level (Gait) contact guard;verbal cues;nonverbal cues (demo/gesture)  -EM (r) JG (t) EM (c)     Assistive Device (Gait) walker, front-wheeled  -EM (r) JG (t) EM (c)     Patient was able to Ambulate yes  -EM (r) JG (t) EM (c)     Distance in Feet (Gait) 400  -EM (r) JG (t) EM (c)     Deviations/Abnormal Patterns (Gait) donna decreased;gait speed decreased  -EM (r) JG (t) EM (c)     Bilateral Gait Deviations forward flexed posture  -EM (r) JG (t) EM (c)     Gait Assessment/Intervention slow walking secondary to guarding  -EM (r) JG (t) EM (c)               User Key  (r) = Recorded By, (t) = Taken By, (c) = Cosigned By      Initials Name Provider Type    EM Ivania Morales, PT Physical Therapist    Fortunato Quevedo, PT Student PT Student                   Obj/Interventions       Row Name 03/21/24 0914          Balance    Balance Assessment sitting dynamic balance;sitting static balance;sit to stand dynamic balance;standing static balance;standing dynamic balance  -EM (r) JG (t) EM (c)     Static Sitting Balance independent  -EM (r) JG (t) EM (c)     Dynamic Sitting Balance independent  -EM (r) JG (t) EM (c)     Position, Sitting Balance unsupported;sitting in chair  -EM (r) JG (t) EM (c)     Static Standing Balance  contact guard;non-verbal cues (demo/gesture);verbal cues  -EM (r) JG (t) EM (c)     Dynamic Standing Balance verbal cues;non-verbal cues (demo/gesture);contact guard  -EM (r) JG (t) EM (c)     Position/Device Used, Standing Balance supported;walker, front-wheeled  -EM (r) JG (t) EM (c)               User Key  (r) = Recorded By, (t) = Taken By, (c) = Cosigned By      Initials Name Provider Type    EM Ivaina Morales, PT Physical Therapist    Fortunato Quevedo, PT Student PT Student                   Goals/Plan    No documentation.                  Clinical Impression       Row Name 03/21/24 0914          Pain    Pretreatment Pain Rating 5/10  -EM (r) JG (t) EM (c)     Posttreatment Pain Rating 5/10  -EM (r) JG (t) EM (c)     Pre/Posttreatment Pain Comment no increase in pain with activity  -EM (r) JG (t) EM (c)       Row Name 03/21/24 0914          Plan of Care Review    Plan of Care Reviewed With patient  -EM (r) JG (t) EM (c)     Progress improving  -EM (r) JG (t) EM (c)     Outcome Evaluation Pt agreeable to therapy this date. Pt UI upon arrival. STS from chair performed with SBA and rwx. Pt continues to require extra time with all movement secondary to guarding to avoid increase in back pain. Pt ambulated 400' with CGA and rwx this date. No reports of increase in back pain with ambulation. Pt returned to chair at conclusion of session. Continued skilled therapy needed to address deficits in functional mobility. Continue to progress as indicated. D/C to home with outpatient services is recommended.  -EM (r) JG (t) EM (c)       Row Name 03/21/24 0914          Therapy Assessment/Plan (PT)    Rehab Potential (PT) good, to achieve stated therapy goals  -EM (r) JG (t) EM (c)     Criteria for Skilled Interventions Met (PT) yes;skilled treatment is necessary  -EM (r) JG (t) EM (c)     Therapy Frequency (PT) 5 times/wk  -EM (r) JG (t) EM (c)       Row Name 03/21/24 0914          Positioning and Restraints     Pre-Treatment Position sitting in chair/recliner  -EM (r) JG (t) EM (c)     Post Treatment Position chair  -EM (r) JG (t) EM (c)     In Chair sitting;call light within reach;encouraged to call for assist;exit alarm on  -EM (r) JG (t) EM (c)               User Key  (r) = Recorded By, (t) = Taken By, (c) = Cosigned By      Initials Name Provider Type    Ivania Juarez, PT Physical Therapist    Fortunato Quevedo, PT Student PT Student                   Outcome Measures       Row Name 03/21/24 0918 03/21/24 0800       How much help from another person do you currently need...    Turning from your back to your side while in flat bed without using bedrails? 4  -EM (r) JG (t) EM (c) 4  -MG    Moving from lying on back to sitting on the side of a flat bed without bedrails? 4  -EM (r) JG (t) EM (c) 4  -MG    Moving to and from a bed to a chair (including a wheelchair)? 4  -EM (r) JG (t) EM (c) 4  -MG    Standing up from a chair using your arms (e.g., wheelchair, bedside chair)? 4  -EM (r) JG (t) EM (c) 4  -MG    Climbing 3-5 steps with a railing? 3  -EM (r) JG (t) EM (c) 3  -MG    To walk in hospital room? 4  -EM (r) JG (t) EM (c) 4  -MG    AM-PAC 6 Clicks Score (PT) 23  -EM (r) JG (t) 23  -MG    Highest Level of Mobility Goal 7 --> Walk 25 feet or more  -EM (r) JG (t) 7 --> Walk 25 feet or more  -MG      Row Name 03/21/24 0918          Functional Assessment    Outcome Measure Options AM-PAC 6 Clicks Basic Mobility (PT)  -EM (r) JG (t) EM (c)               User Key  (r) = Recorded By, (t) = Taken By, (c) = Cosigned By      Initials Name Provider Type    Ivania Juarez, PT Physical Therapist    MG Guess, Marya, RN Registered Nurse    Fortunato Quevedo, PT Student PT Student                                 Physical Therapy Education       Title: PT OT SLP Therapies (In Progress)       Topic: Physical Therapy (In Progress)       Point: Mobility training (Done)       Learning Progress Summary             Patient  Acceptance, E, VU by  at 3/21/2024 0919    Acceptance, E, VU by  at 3/20/2024 1605                         Point: Home exercise program (Not Started)       Learner Progress:  Not documented in this visit.              Point: Body mechanics (Not Started)       Learner Progress:  Not documented in this visit.              Point: Precautions (Done)       Learning Progress Summary             Patient Acceptance, E, VU by  at 3/21/2024 0919    Acceptance, E, VU by  at 3/20/2024 1605                                         User Key       Initials Effective Dates Name Provider Type Discipline     02/29/24 -  Fortunato Allen, PT Student PT Student PT                  PT Recommendation and Plan  Planned Therapy Interventions (PT): balance training, bed mobility training, gait training, home exercise program, patient/family education, ROM (range of motion), stair training, strengthening, stretching, transfer training  Plan of Care Reviewed With: patient  Progress: improving  Outcome Evaluation: Pt agreeable to therapy this date. Pt [facility] upon arrival. STS from chair performed with SBA and rwx. Pt continues to require extra time with all movement secondary to guarding to avoid increase in back pain. Pt ambulated 400' with CGA and rwx this date. No reports of increase in back pain with ambulation. Pt returned to chair at conclusion of session. Continued skilled therapy needed to address deficits in functional mobility. Continue to progress as indicated. D/C to home with outpatient services is recommended.     Time Calculation:         PT Charges       Row Name 03/21/24 0919             Time Calculation    Start Time 0857  -EM (r) JG (t) EM (c)      Stop Time 0909  -EM (r) JG (t) EM (c)      Time Calculation (min) 12 min  -EM (r) JG (t)      PT Received On 03/21/24  -EM (r) JG (t) EM (c)      PT - Next Appointment 03/22/24  -EM (r) JG (t) EM (c)         Time Calculation- PT    Total Timed Code Minutes- PT 12 minute(s)  -EM  (r) JG (t) EM (c)         Timed Charges    48397 - Gait Training Minutes  12  -EM (r) JG (t) EM (c)         Total Minutes    Timed Charges Total Minutes 12  -EM (r) JG (t)       Total Minutes 12  -EM (r) JG (t)                User Key  (r) = Recorded By, (t) = Taken By, (c) = Cosigned By      Initials Name Provider Type    EM Ivania Morales, PT Physical Therapist    Fortunato Quevedo, PT Student PT Student                  Therapy Charges for Today       Code Description Service Date Service Provider Modifiers Qty    83434127651 HC PT THERAPEUTIC ACT EA 15 MIN 3/20/2024 Fortunato Allen, PT Student GP 1    84702075212 HC PT EVAL MOD COMPLEXITY 2 3/20/2024 Fortunato Allen, PT Student GP 1    17675224402 HC GAIT TRAINING EA 15 MIN 3/21/2024 Fortunato Allen, PT Student GP 1            PT G-Codes  Outcome Measure Options: AM-PAC 6 Clicks Basic Mobility (PT)  AM-PAC 6 Clicks Score (PT): 23  PT Discharge Summary  Anticipated Discharge Disposition (PT): home with outpatient therapy services    CATHERINE Cuevas  3/21/2024

## 2024-03-22 ENCOUNTER — TRANSITIONAL CARE MANAGEMENT TELEPHONE ENCOUNTER (OUTPATIENT)
Dept: CALL CENTER | Facility: HOSPITAL | Age: 44
End: 2024-03-22
Payer: COMMERCIAL

## 2024-03-22 ENCOUNTER — TELEPHONE (OUTPATIENT)
Dept: NEUROSURGERY | Facility: CLINIC | Age: 44
End: 2024-03-22
Payer: COMMERCIAL

## 2024-03-22 NOTE — OUTREACH NOTE
Call Center TCM Note      Flowsheet Row Responses   [facility] patient discharged from? [place]   Does the patient have one of the following disease processes/diagnoses(primary or secondary)? Other   TCM attempt successful? Yes   Call start time 1106   Call end time 1111   Discharge diagnosis Low back pain   Person spoke with today (if not patient) and relationship patient   Comments Patient will see  Dr. Barba in two weeks.   Does the patient have an appointment with their PCP within 7-14 days of discharge? Other   Nursing Interventions Patient desires to follow up with specialty only   Home health comments Patient will start outpatient PT on 4/4   What DME was ordered? walker from [employer]   Has all DME been delivered? Yes   Psychosocial issues? No   Did the patient receive a copy of their discharge instructions? Yes   Nursing interventions Reviewed instructions with patient   What is the patient's perception of their health status since discharge? Improving   Is the patient/caregiver able to teach back signs and symptoms related to disease process for when to call PCP? Yes   Is the patient/caregiver able to teach back signs and symptoms related to disease process for when to call 911? Yes   Is the patient/caregiver able to teach back the hierarchy of who to call/visit for symptoms/problems? PCP, Specialist, Home health nurse, Urgent Care, ED, 911 Yes   If the patient is a current smoker, are they able to teach back resources for cessation? Not a smoker   TCM call completed? Yes   Wrap up additional comments No needs at this time. Will start PT on 4/4 outpatient.   Call end time 1111   Would this patient benefit from a Referral to Amb Social Work? No   Is the patient interested in additional calls from an ambulatory ? No            Chino Gomes RN    3/22/2024, 11:12 EDT

## 2024-03-22 NOTE — TELEPHONE ENCOUNTER
"  Caller: Juan Mohan \"Harjit\"    Relationship to patient: Self    Best call back number: [phone]    Chief complaint: BACK PAIN    Type of visit: HOSPITAL F/U    Requested date: 4-6 WEEKS     If rescheduling, when is the original appointment: NA     Additional notes:ED NOTE STATES TO F/U IN 2 WEEKS WITH DR. PARSONS BUT PROVIDER'S CONSULT STATES TALKED WITH DR. SILVERMAN AND PT CAN F/U 4-6 WEEKS. PER LEONIDAS SCHEDULE WITH HERRERA BUT FOUND OUT THIS IS WC. ADVISE PT WE NEED EITHER A WC AUTH OR DENIAL LETTER IN ORDER TO PROCEED.        JUST A FYI  "

## 2024-03-29 ENCOUNTER — ANESTHESIA EVENT (OUTPATIENT)
Dept: GASTROENTEROLOGY | Facility: HOSPITAL | Age: 44
End: 2024-03-29
Payer: COMMERCIAL

## 2024-04-01 ENCOUNTER — ANESTHESIA (OUTPATIENT)
Dept: GASTROENTEROLOGY | Facility: HOSPITAL | Age: 44
End: 2024-04-01
Payer: COMMERCIAL

## 2024-04-02 ENCOUNTER — TELEPHONE (OUTPATIENT)
Dept: SURGERY | Facility: CLINIC | Age: 44
End: 2024-04-02
Payer: COMMERCIAL

## 2024-04-02 NOTE — TELEPHONE ENCOUNTER
Patient was scheduled for Colonoscopy with Dr. Chan on 4/1/24. He needs to reschedule as he has been congested and did not have Colonoscopy done.

## 2024-04-03 ENCOUNTER — TELEPHONE (OUTPATIENT)
Dept: NEUROSURGERY | Facility: CLINIC | Age: 44
End: 2024-04-03
Payer: COMMERCIAL

## 2024-04-03 NOTE — TELEPHONE ENCOUNTER
Caller: PATIENT    Relationship: SELF    Best call back number: [phone]    What is the best time to reach you: ANYTIME    Who are you requesting to speak with (clinical staff, provider,  specific staff member): CLINICAL STAFF    Do you know the name of the person who called: NA    What was the call regarding: PT CALLED AND STATES THAT HE WAS SEEN BY OUR OFFICE IN HOSPITAL-PT STATES THAT THIS IS WC AND HE HAS RECEIVED PAPERWORK THAT NEEDS TO BE FILLED OUT-PT STATES THAT THEIR WAS PAPERWORK THAT WAS FILLED OUT IN THE HOSPITAL FOR LIGHT DUTY AND SIGNED BY APRN-PT STATES THAT THE HIS COMPANY IS NEEDING MORE IN DEPTH INFORMATION REGARDING HIS RESTRICTIONS-PT IS ASKING IF HE CAN DROP THESE OFF TO OUR OFFICE-SENDING TO OFFICE AS PATIENT IS ASKING FOR A CALL BACK-PT HAS NOT BEEN SEEN IN OUR OFFICE YET-PT WAS FOLLOWED IN HOSPITAL -SENDING TO OFFICE TO ADVISE OF PATIENTS QUESTION FOR PAPERWORK THANK YOU    Is it okay if the provider responds through MyChart: NO

## 2024-04-04 ENCOUNTER — TREATMENT (OUTPATIENT)
Dept: PHYSICAL THERAPY | Facility: CLINIC | Age: 44
End: 2024-04-04
Payer: OTHER MISCELLANEOUS

## 2024-04-04 DIAGNOSIS — M53.86 DECREASED RANGE OF MOTION OF INTERVERTEBRAL DISCS OF LUMBAR SPINE: ICD-10-CM

## 2024-04-04 DIAGNOSIS — M54.50 ACUTE MIDLINE LOW BACK PAIN WITHOUT SCIATICA: Primary | ICD-10-CM

## 2024-04-04 NOTE — PROGRESS NOTES
Physical Therapy Initial Evaluation and Plan of Care                                               [address], [address]                                                           [place], [place]                                                         [phone]    Patient: Juan Mohan   : 1980  Diagnosis/ICD-10 Code:  Acute midline low back pain without sciatica [M54.50]  Referring practitioner: Sachin Johns MD  Date of Initial Visit: 2024  Today's Date: 2024  Patient seen for 1 sessions           Subjective Questionnaire: Oswestry: 23      Subjective Evaluation    History of Present Illness  Mechanism of injury: Pt reports injury to his low back which occurred on 3/18/24 at work for [employer]. 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 The pain was sharp but dissipated after a few minutes. He continued working this day, but was unable to stand or walk independently the next morning. His wife took him to the ED on 3/19/24 where they performed a MRI.    MRI of the lumbar spine on 3-19-24  showed at the L4-5 level there was mild left and mild to moderate right facet overgrowth.  There is a mild disc space narrowing and posterior annular fissure or tear with a small posterior central disc protrusion was noted.      He also obtained an epidural injection on 3/19/24 in the ED which provided slight relief for about 2 days but he still has a lot of pain.     The pain in his lower back was more left sided but is now shifting toward the middle and right side. His pain is described as a toothache which can become sharp for increase with prolonged sitting, when pushing up to stand or other fast movements. Pain has also started to radiate into bilateral groin areas when it is at its worst. He notes increased pain when trying to fall asleep at night and when he wakes up in the morning.    He has not  experienced the debilitating, stabbing pain which sent him to the ED but he often finds that he must pause when walking and spends most of his time sitting or lying down.     Difficulty with lifting 8 month old child, putting on socks, shower, reaching behind his back    PMHx of injections in his neck, chronic low back pain for many years      Patient Occupation: [employer]   Precautions and Work Restrictions: off work currentlyQuality of life: good    Pain  Current pain ratin  At best pain ratin  At worst pain ratin  Location: Mid-low back bilateral sides and hips  Quality: sharp, tight, dull ache and pulling  Relieving factors: medications, relaxation and rest  Aggravating factors: sleeping, standing, lifting, ambulation and squatting  Progression: improved    Social Support  Lives in: multiple-level home  Lives with: spouse and young children    Diagnostic Tests  MRI studies: abnormal    Treatments  Current treatment: injection treatment  Patient Goals  Patient goals for therapy: decreased pain, increased motion, return to work, return to sport/leisure activities, independence with ADLs/IADLs and increased strength  Patient goal: possibly getting back into running           Objective          Neurological Testing     Sensation     Lumbar   Left   Intact: light touch    Right   Intact: light touch    Active Range of Motion     Lumbar   Flexion: with pain  Extension: with pain  Left lateral flexion: with pain  Right rotation: with pain    Additional Active Range of Motion Details  Lumbar AROM Percentage:   Flexion: 90% with pain  Extension: 50% with pain  Rotation (R): 50% with pain in L hip  Rotation (L): 75%  Lateral flexion (R): 100%  Lateral flexion (L): 75% with pain in R lower back    Strength/Myotome Testing     Left Hip   Planes of Motion   Flexion: 5  Abduction: 5    Right Hip   Planes of Motion   Flexion: 5  Abduction: 5    Left Knee   Flexion: 5  Extension: 5    Right Knee    Flexion: 5  Extension: 5    Left Ankle/Foot   Dorsiflexion: 5  Plantar flexion: 5    Right Ankle/Foot   Dorsiflexion: 5  Plantar flexion: 5    Additional Strength Details  Tested in sitting    Pain in R paraspinals with L hip flexion    Lumbar Flexibility Comments:   Bilateral hamstring and piriformis impairment          Assessment & Plan       Assessment  Impairments: abnormal or restricted ROM, activity intolerance, impaired physical strength, lacks appropriate home exercise program, pain with function and weight-bearing intolerance   Functional limitations: carrying objects, lifting, sleeping, walking, pulling, pushing, uncomfortable because of pain, moving in bed, sitting, standing, reaching behind back and unable to perform repetitive tasks   Assessment details: Harjit is a 43 y/o male reporting to OP PT for initial evaluation regarding low back pain which began following an injury at work. On 3/18/24, he states that he was working on a 4x4 vehicle, pulling up on a tire when he felt a sharp pain in his lower back. He did not believe the pain to be too bad and continued working that day, but found that he was unable to walk or stand the next morning due to sharp and stabbing low back pain. He was seen in the ED where they performed a lumbar MRI and gave him an epidural injection. Harjit reports slight relief in his low back pain following the injection, but this only lasted for about 2 days. His pain has not been as severe as it was when he went to the ED, but he still has great difficulty with prolonged sitting, standing, or walking, and is unable to lift up his 8 month child or play with his 4 year old. He has difficulty donning/ doffing his shoes and socks and with washing his back. Harjit has not attempted any heavy lifting due to pain. He describes his midline low back pain as a dull ache which becomes sharp with bending or twisting and increases the longer he is in one static position. Upon evaluation, he  demonstrates deficits in lumbar AROM, limited by pain, and decreased bilateral LE flexibility. Skilled OP PT is deemed reasonable and necessary in order to address these deficits, limitations, and impairments and assist with return to prior level of function.   Prognosis: good    Goals  Plan Goals: Short Term: 3 weeks  1. Pt will be independent with initial HEP  2. Pt will report 50% decrease in low back pain  3. Pt will report improved ability to sleep through the night with less disturbance from low back pain   4. Pt will demonstrate improved lumbar AROM in all planes of motion to WFL without significant reports of pain for indication of improved ability to perform all ADLs    Long Term: 6 weeks  1. Pt will be independent with progressed HEP  2. Pt will report 0-2/10 pain in his low back   3. Pt will demonstrate improved and symmetrical BLE flexibility for indication of improved mobility and ability to perform ADLs and recreational activities without pain  4. Pt will lift and carry a 20 pound box for without significant reports of pain for indication of improved ability to perform work duties and carry his child.    Plan  Therapy options: will be seen for skilled therapy services  Planned modality interventions: electrical stimulation/Russian stimulation and thermotherapy (hydrocollator packs)  Planned therapy interventions: home exercise program, therapeutic activities, stretching, strengthening, neuromuscular re-education and functional ROM exercises  Frequency: 2x week  Duration in weeks: 6  Treatment plan discussed with: patient        Manual Therapy:    0     mins  87114;  Therapeutic Exercise:    10     mins  42860;     Neuromuscular Bing:    0    mins  09357;    Therapeutic Activity:     10     mins  92814;     Gait Trainin     mins  32045;     Ultrasound:     0     mins  20595;    Electrical Stimulation:    0     mins  05937 ( );  Dry Needling     0     mins self-pay    Timed Treatment:    20   mins   Total Treatment:     40   mins    PT SIGNATURE: Lucas Carbone PT, DPT  [place] License #: [id number]   Lucas Carbone PT, 04/04/24, 3:49 PM EDT  DATE TREATMENT INITIATED: 4/5/2024    Initial Certification  Certification Period: 7/4/2024  I certify that the therapy services are furnished while this patient is under my care.  The services outlined above are required by this patient, and will be reviewed every 90 days.     PHYSICIAN: Sachin Johns MD      DATE:     Please sign and return via fax to [phone].. Thank you, [facility].

## 2024-04-05 ENCOUNTER — TELEPHONE (OUTPATIENT)
Dept: NEUROSURGERY | Facility: CLINIC | Age: 44
End: 2024-04-05
Payer: COMMERCIAL

## 2024-04-05 NOTE — TELEPHONE ENCOUNTER
Called and tried to leave a vm for Harjit however the inbox was full.  I was calling to notify Harjit that unfortunately we cannot fill out any of the WC paperwork due to he was discharged from neurosurgery. Per ANAI, and Dr. Castaneda there is nothing we can do for him from a neurological surgery standpoint.  We cannot put in and back date a letter stating he was on light duty for two weeks due to we cleared him from neurosurgery. He can either pick-up his paperwork (it is not filled out) or we can mail it back to him. After to talking to management and per APRN there is no reason for him to come for a follow-up appointment and should be canceled.  Appointment with Dr. Castaneda on April 17, 2024 canceled due to there is nothing we can do for him from neurosurgical standpoint.    OK for [facility] to read.

## 2024-04-05 NOTE — TELEPHONE ENCOUNTER
Attempted to call patient to get him rescheduled. I called the primary phone# and his wife answered. She requested that I call the patient on his phone number to speak with him. Attempted to call him but he did not answer and his voicemail is full, therefor I am unable to  leave a message. Will attempt to call the patient again.

## 2024-04-08 ENCOUNTER — TREATMENT (OUTPATIENT)
Dept: PHYSICAL THERAPY | Facility: CLINIC | Age: 44
End: 2024-04-08
Payer: OTHER MISCELLANEOUS

## 2024-04-08 DIAGNOSIS — M54.50 ACUTE MIDLINE LOW BACK PAIN WITHOUT SCIATICA: Primary | ICD-10-CM

## 2024-04-08 DIAGNOSIS — M53.86 DECREASED RANGE OF MOTION OF INTERVERTEBRAL DISCS OF LUMBAR SPINE: ICD-10-CM

## 2024-04-08 PROCEDURE — 97112 NEUROMUSCULAR REEDUCATION: CPT

## 2024-04-08 PROCEDURE — 97110 THERAPEUTIC EXERCISES: CPT

## 2024-04-08 NOTE — PROGRESS NOTES
Physical Therapy Daily Progress Note                                            [address], [address]                                                           [place], [place]                                                         [phone]    Patient: Juan Mohan   : 1980  Diagnosis/ICD-10 Code:  Acute midline low back pain without sciatica [M54.50]  Referring practitioner: Sachin Johns MD  Date of Initial Visit: Type: THERAPY  Noted: 2024  Today's Date: 2024  Patient seen for 2 sessions           Subjective Evaluation    History of Present Illness    Subjective comment: Harjit reports that his back does not hurt too bad today. He was working over an engine a few days ago for several hours at a time and this caused severe pain and soreness       Objective     See Exercise, Manual, and Modality Logs for complete treatment.     Assessment & Plan       Assessment  Assessment details: Harjit tolerated all exercises without significant complaints of pain. He reported most stretching in his midline lower back with newly added posterior pelvic tilt and with L stretching for lumbar extension. Core strengthening program initiated today to assist with improving core and trunk stability. He demonstrated muscle shakiness during all core exercises, indicating strength deficits in all abdominal muscles. Plan to continue with thoracic and lumbar stretching and will progress core strengthening program as tolerated.         Progress per Plan of Care           Manual Therapy:    0     mins  93755;  Therapeutic Exercise:    20     mins  24772;     Neuromuscular Bing:    10    mins  07226;    Therapeutic Activity:     0     mins  23690;     Gait Trainin     mins  53829;     Ultrasound:     0     mins  32389;    Electrical Stimulation:    0     mins  27758 ( );  Dry Needling     0     mins self-pay    Timed Treatment:   30   mins   Total Treatment:     30   mins    Lucas  Raf, PT, DPT  Physical Therapist  [place] License #: [id number]  Lucas Carbone, PT, 04/08/24, 2:29 PM EDT

## 2024-04-08 NOTE — TELEPHONE ENCOUNTER
2nd attempt to reach patient on his phone# to rescheduled his procedure. He did not answer and his voicemail is still full so I am unable to leave a message.

## 2024-04-10 ENCOUNTER — TREATMENT (OUTPATIENT)
Dept: PHYSICAL THERAPY | Facility: CLINIC | Age: 44
End: 2024-04-10
Payer: OTHER MISCELLANEOUS

## 2024-04-10 DIAGNOSIS — M54.50 ACUTE MIDLINE LOW BACK PAIN WITHOUT SCIATICA: Primary | ICD-10-CM

## 2024-04-10 DIAGNOSIS — M53.86 DECREASED RANGE OF MOTION OF INTERVERTEBRAL DISCS OF LUMBAR SPINE: ICD-10-CM

## 2024-04-10 NOTE — PROGRESS NOTES
"   Physical Therapy Daily Progress Note                                            [address], [address]                                                           [place], [place]                                                         [phone]    Patient: Juan Mohan   : 1980  Diagnosis/ICD-10 Code:  Acute midline low back pain without sciatica [M54.50]  Referring practitioner: Sachin Johns MD  Date of Initial Visit: Type: THERAPY  Noted: 2024  Today's Date: 4/10/2024  Patient seen for 3 sessions           Subjective Evaluation    History of Present Illness    Subjective comment: Harjit reports that he had increased soreness following his last session which lasted until the next morning.       Objective     See Exercise, Manual, and Modality Logs for complete treatment.     Assessment & Plan       Assessment  Assessment details: Harjit tolerated all stretching and strengthening without significant reports of pain. He again had reports of pain with his DKTC stretch with hip extension stating it felt like popping. When instructed to maintain a neutral pelvis, this assisted slightly though he did have some persisting discomfort. He gets the most relief or largest stretch with lumbar flexion \"L stretch.\" He is not scheduled for more sessions due to not receiving authorization from worker's comp yet. Harjit was encouraged to continue with his HEP and discontinue any painful activities.         Progress per Plan of Care           Manual Therapy:    0     mins  48703;  Therapeutic Exercise:    28     mins  16848;     Neuromuscular Bing:    10    mins  67009;    Therapeutic Activity:     0     mins  72700;     Gait Trainin     mins  89320;     Ultrasound:     0     mins  29444;    Electrical Stimulation:    0     mins  54999 ( );  Dry Needling     0     mins self-pay    Timed Treatment:   38   mins   Total Treatment:     38   mins    Lucas Carbone, PT, DPT  Physical " Therapist  [place] License #: [id number]  Lucas Carbone, PT, 04/10/24, 1:25 PM EDT

## 2024-04-12 ENCOUNTER — OFFICE VISIT (OUTPATIENT)
Dept: FAMILY MEDICINE CLINIC | Age: 44
End: 2024-04-12
Payer: COMMERCIAL

## 2024-04-12 VITALS
HEART RATE: 71 BPM | OXYGEN SATURATION: 96 % | BODY MASS INDEX: 33.43 KG/M2 | WEIGHT: 246.8 LBS | HEIGHT: 72 IN | SYSTOLIC BLOOD PRESSURE: 139 MMHG | DIASTOLIC BLOOD PRESSURE: 98 MMHG | TEMPERATURE: 97.8 F

## 2024-04-12 DIAGNOSIS — Z00.00 ROUTINE GENERAL MEDICAL EXAMINATION AT A HEALTH CARE FACILITY: Primary | ICD-10-CM

## 2024-04-12 DIAGNOSIS — Z13.6 SCREENING FOR CARDIOVASCULAR CONDITION: ICD-10-CM

## 2024-04-12 DIAGNOSIS — I10 ESSENTIAL HYPERTENSION: ICD-10-CM

## 2024-04-12 RX ORDER — AMLODIPINE BESYLATE 10 MG/1
10 TABLET ORAL DAILY
Qty: 90 TABLET | Refills: 1 | Status: SHIPPED | OUTPATIENT
Start: 2024-04-12

## 2024-04-12 NOTE — PROGRESS NOTES
Chief Complaint  Juan Mohan presents to [facility] for Annual Exam, Hypertension, and Work Related Injury (Pt wanting to discuss work darian comp from work related injury on 3/18/24 )      Subjective     History of Present Illness  Harjit is here today for annual exam.   Last annual exam was  2 year(s)  Last eye exam: 2 months  PROVIDER:     Last dental exam:   1 week    PROVIDER:  Xiao    Diet / exercise:   Well balanced diet and exercise regularly  Patient's Body mass index is 33.47 kg/m². indicating that he is obese (BMI >30)  Satisfied with weight?  no    Patient Care Team:  Josy Vinson APRN as PCP - General (Nurse Practitioner)  Abelino Davison MD as Consulting Physician (General Surgery)     The 10-year ASCVD risk score (Jenni DICK, et al., 2019) is: 1.8%    Values used to calculate the score:      Age: 44 years      Sex: Male      Is Non- : No      Diabetic: No      Tobacco smoker: No      Systolic Blood Pressure: 139 mmHg      Is BP treated: Yes      HDL Cholesterol: 45 mg/dL      Total Cholesterol: 180 mg/dL    Harjit also injured his back while at work and is needing papers completed.  His paperwork is workmans comp claim and is requiring MD signature.  He has been instructed to follow up with a work health center as instructed for MD signature.      Assessment and Plan     -advised of a well balanced diet and exercise as tolerated  -advised of annual wellness exams are recommended  -discussed health care maintenance and gaps in care and orders have been placed as necessary and as willing by the patient.     Diagnoses and all orders for this visit:    1. Routine general medical examination at a health care facility (Primary)    2. Essential hypertension  Comments:  increase amlodipine to 10 mg  continue others and follow up in 6-8 weeks check BP at home and forward results  Orders:  -     amLODIPine (NORVASC) 10 MG tablet; Take  "1 tablet by mouth Daily.  Dispense: 90 tablet; Refill: 1    3. Screening for cardiovascular condition  -     Lipid panel; Future             Follow Up   Return in about 8 weeks (around 6/7/2024) for Recheck.    Future Appointments   Date Time Provider Department Center   10/11/2024  9:00 AM Josy Vinson APRN [facility]         New Medications Ordered This Visit   Medications    amLODIPine (NORVASC) 10 MG tablet     Sig: Take 1 tablet by mouth Daily.     Dispense:  90 tablet     Refill:  1       Medications Discontinued During This Encounter   Medication Reason    naproxen (NAPROSYN) 500 MG tablet Historical Med - Therapy completed    amLODIPine (NORVASC) 5 MG tablet Dose adjustment         Review of Systems   Constitutional:  Negative for fatigue.   Respiratory:  Negative for cough and shortness of breath.    Cardiovascular:  Negative for chest pain.   Genitourinary:  Negative for dysuria.   Musculoskeletal:  Positive for back pain.   Psychiatric/Behavioral:  Negative for depressed mood and stress. The patient is not nervous/anxious.        Objective     Vitals:    04/12/24 0914 04/12/24 1023   BP: (!) 140/103 139/98   BP Location: Left arm Left arm   Patient Position: Sitting Sitting   Cuff Size: Adult Adult   Pulse: 73 71   Temp: 97.8 °F (36.6 °C)    TempSrc: Oral    SpO2: 96%    Weight: 112 kg (246 lb 12.8 oz)    Height: 182.9 cm (72\")      Body mass index is 33.47 kg/m².            Physical Exam  Vitals reviewed.   Constitutional:       Appearance: Normal appearance.   HENT:      Head: Normocephalic.      Mouth/Throat:      Mouth: Mucous membranes are moist.      Pharynx: Oropharynx is clear.   Eyes:      Conjunctiva/sclera: Conjunctivae normal.   Cardiovascular:      Rate and Rhythm: Normal rate and regular rhythm.      Heart sounds: No murmur heard.  Pulmonary:      Effort: Pulmonary effort is normal.      Breath sounds: Normal breath sounds.   Abdominal:      General: Bowel sounds are normal.      " Tenderness: There is no abdominal tenderness.   Musculoskeletal:         General: Normal range of motion.      Cervical back: Normal range of motion.      Lumbar back: Decreased range of motion.   Skin:     General: Skin is warm and dry.   Neurological:      General: No focal deficit present.      Mental Status: He is alert. Mental status is at baseline.   Psychiatric:         Mood and Affect: Mood normal.         Behavior: Behavior normal.         Thought Content: Thought content normal.            Result Review                   Allergies   Allergen Reactions    Codeine Anaphylaxis    Keflex [Cephalexin] Anaphylaxis    Penicillins Anaphylaxis      Past Medical History:   Diagnosis Date    Allergic     Codiene pennicillin keflex    Clotting disorder     Off and on for a few years    Cyst of prostate     NO PROBLEMS CURRENTLY    Diverticulosis     Hernia, inguinal     right    Hypertension      Current Outpatient Medications   Medication Sig Dispense Refill    hydroCHLOROthiazide (HYDRODIURIL) 25 MG tablet TAKE 1 TABLET BY MOUTH EVERY DAY 90 tablet 1    metoprolol succinate XL (TOPROL-XL) 50 MG 24 hr tablet Take 1 tablet by mouth Daily. 90 tablet 1    omeprazole (priLOSEC) 40 MG capsule Take 1 capsule by mouth As Needed.      amLODIPine (NORVASC) 10 MG tablet Take 1 tablet by mouth Daily. 90 tablet 1     No current facility-administered medications for this visit.     Past Surgical History:   Procedure Laterality Date    APPENDECTOMY      CLOSED REDUCTION Left     left arm    COLONOSCOPY      EPIDURAL BLOCK      INGUINAL HERNIA REPAIR Right 02/28/2022    Procedure: INGUINAL HERNIA REPAIR LAPAROSCOPIC WITH DAVINCI ROBOT;  Surgeon: Abelino Davison MD;  Location: [facility] [place];  Service: Robotics - [employer];  Laterality: Right;      Health Maintenance Due   Topic Date Due    LIPID PANEL  02/22/2024      Immunization History   Administered Date(s) Administered    COVID-19 (MODERNA) 1st,2nd,3rd Dose Monovalent  08/12/2021, 09/22/2021    Flu Vaccine Quad PF >36MO 09/08/2016    Fluzone (or Fluarix & Flulaval for VFC) >6mos 10/13/2022, 02/13/2024    Fluzone Quad >6mos (Multi-dose) 09/08/2016    Tdap 02/15/2024         Part of this note may be an electronic transcription/translation of spoken language to printed   text using the Dragon Dictation System.      Josy Vinson APRN

## 2024-04-16 ENCOUNTER — TELEPHONE (OUTPATIENT)
Dept: NEUROSURGERY | Facility: CLINIC | Age: 44
End: 2024-04-16
Payer: COMMERCIAL

## 2024-04-16 ENCOUNTER — TELEPHONE (OUTPATIENT)
Dept: FAMILY MEDICINE CLINIC | Age: 44
End: 2024-04-16
Payer: COMMERCIAL

## 2024-04-16 NOTE — TELEPHONE ENCOUNTER
Called and talk to Anne Marie about his letter for clearance from neurological surgery. I let his wife know we sent a letter to him via [employer].

## 2024-04-16 NOTE — TELEPHONE ENCOUNTER
Patient's spouse, Jess called due to patient being in and out of the hospital and they are unable to help him. He is wanting to see an MD in regards to meds but Josy is his PCP. I did inform her that none of our MD's were taking new patient at this time and I would have to send a message back. Please give her a call back . Thank you

## 2024-04-16 NOTE — TELEPHONE ENCOUNTER
Caller: PATIENT    Relationship: SELF    Best call back number: [phone]    What is the best time to reach you: ANYTIME    Who are you requesting to speak with (clinical staff, provider,  specific staff member): CLINICAL     Do you know the name of the person who called: ELSIE    What was the call regarding: PT CALLED AND STATES THAT HE WAS UNSURE WHERE THE LETTER IS LOCATED-HE STATES HE WAS UNABLE TO FIND IT-PT IS ASKING FOR A CALL BACK TO ADVISE THANK YOU     Is it okay if the provider responds through MyChart: NO

## 2024-05-07 ENCOUNTER — TREATMENT (OUTPATIENT)
Dept: PHYSICAL THERAPY | Facility: CLINIC | Age: 44
End: 2024-05-07
Payer: OTHER MISCELLANEOUS

## 2024-05-07 DIAGNOSIS — M53.86 DECREASED RANGE OF MOTION OF INTERVERTEBRAL DISCS OF LUMBAR SPINE: ICD-10-CM

## 2024-05-07 DIAGNOSIS — M54.50 ACUTE MIDLINE LOW BACK PAIN WITHOUT SCIATICA: Primary | ICD-10-CM

## 2024-05-07 PROCEDURE — 97110 THERAPEUTIC EXERCISES: CPT

## 2024-05-07 PROCEDURE — 97112 NEUROMUSCULAR REEDUCATION: CPT

## 2024-05-13 ENCOUNTER — TREATMENT (OUTPATIENT)
Dept: PHYSICAL THERAPY | Facility: CLINIC | Age: 44
End: 2024-05-13
Payer: OTHER MISCELLANEOUS

## 2024-05-13 DIAGNOSIS — M54.50 ACUTE MIDLINE LOW BACK PAIN WITHOUT SCIATICA: Primary | ICD-10-CM

## 2024-05-13 DIAGNOSIS — M53.86 DECREASED RANGE OF MOTION OF INTERVERTEBRAL DISCS OF LUMBAR SPINE: ICD-10-CM

## 2024-05-13 PROCEDURE — 97110 THERAPEUTIC EXERCISES: CPT | Performed by: PHYSICAL THERAPIST

## 2024-05-13 PROCEDURE — 97530 THERAPEUTIC ACTIVITIES: CPT | Performed by: PHYSICAL THERAPIST

## 2024-05-13 NOTE — PROGRESS NOTES
Physical Therapy Daily Treatment Note    Patient: Juan Mohan  : 1980  Referring practitioner: Sachin Johns MD  Today's Date: 2024    VISIT#: 5      Subjective   Juan Mohan reports: Doing ok, back not too bad this morning. Went to the doctor and they have him scheduled for epidurals on 6/10. Is supposed to continue PT until then.       Objective     See Exercise, Manual, and Modality Logs for complete treatment.     Patient Education: continue HEP to tolerance.     Assessment/Plan  Good response to session, continuing to perform stretching and strengthening to tolerance. I did have him hold a few exercises today due to increased central LBP/pinching. Otherwise did well throughout session.     Progress per Plan of Care            Timed:         Manual Therapy:         mins  09190;     Therapeutic Exercise:    23     mins  35275;     Neuromuscular Bing:        mins  91036;    Therapeutic Activity:     10     mins  29330;     Gait Training:           mins  98446;     Ultrasound:          mins  79751;    Ionto:                                   mins   48279  Self Care:                            mins   31923    Un-Timed:  Electrical Stimulation:         mins  54267 ( );  Dry Needling          mins self-pay  Traction          mins 24602  Re-Eval                               mins  31940    Timed Treatment:   33   mins   Total Treatment:     33   mins    Anna Santiago, PT  Physical Therapist  [place] PT license #: [id number]  [place] PT license #: [id number]

## 2024-05-17 ENCOUNTER — TREATMENT (OUTPATIENT)
Dept: PHYSICAL THERAPY | Facility: CLINIC | Age: 44
End: 2024-05-17
Payer: OTHER MISCELLANEOUS

## 2024-05-17 DIAGNOSIS — M54.50 ACUTE MIDLINE LOW BACK PAIN WITHOUT SCIATICA: Primary | ICD-10-CM

## 2024-05-17 DIAGNOSIS — M53.86 DECREASED RANGE OF MOTION OF INTERVERTEBRAL DISCS OF LUMBAR SPINE: ICD-10-CM

## 2024-05-17 NOTE — PROGRESS NOTES
"   Physical Therapy Daily Progress Note                                            [address], [address]                                                           [place], [place]                                                         [phone]    Patient: Juan Mohan   : 1980  Diagnosis/ICD-10 Code:  Acute midline low back pain without sciatica [M54.50]  Referring practitioner: Sachin Johns MD  Date of Initial Visit: Type: THERAPY  Noted: 2024  Today's Date: 2024  Patient seen for 6 sessions           Subjective Evaluation    History of Present Illness    Subjective comment: rosalie reports increased pain in his R hip radiating down his R LE since Monday following his treatment session       Objective   See Exercise, Manual, and Modality Logs for complete treatment.       Assessment & Plan       Assessment  Assessment details: Session focused on bilateral hip and trunk stretching as well as abdominal bracing. Rosalie did not have complaints of pain throughout his session but did have reports of \"clicking/ popping\" felt in his R lower back, likely due to SI joint dysfunction. Due to his reports of R hip and knee pain, glut stretching and sciatic nerve gliding was added today. He requires extensive tactile, verbal, and visual cues for proper form during posterior pelvic tilting but was ultimately able to perform the last few reps independently. This indicates poor abdominal control and stabilization of his lumbar spine and SI joint. We will continue working on abdominal bracing and improve deep core strength as tolerated.         Progress per Plan of Care           Manual Therapy:    0     mins  44187;  Therapeutic Exercise:    31     mins  71590;     Neuromuscular Bing:    10    mins  41623;    Therapeutic Activity:     0     mins  91191;     Gait Trainin     mins  15040;     Ultrasound:     0     mins  00341;    Electrical Stimulation:    0     mins  57412 (MC " );  Dry Needling     0     mins self-pay    Timed Treatment:   41   mins   Total Treatment:     41   mins    Lucas Carbone PT, DPT  Physical Therapist  [place] License #: [id number]  Lucas Carbone PT, 05/17/24, 9:07 AM EDT

## 2024-05-20 ENCOUNTER — TELEPHONE (OUTPATIENT)
Dept: FAMILY MEDICINE CLINIC | Age: 44
End: 2024-05-20
Payer: COMMERCIAL

## 2024-05-20 ENCOUNTER — TREATMENT (OUTPATIENT)
Dept: PHYSICAL THERAPY | Facility: CLINIC | Age: 44
End: 2024-05-20
Payer: OTHER MISCELLANEOUS

## 2024-05-20 DIAGNOSIS — M54.50 ACUTE MIDLINE LOW BACK PAIN WITHOUT SCIATICA: Primary | ICD-10-CM

## 2024-05-20 DIAGNOSIS — M53.86 DECREASED RANGE OF MOTION OF INTERVERTEBRAL DISCS OF LUMBAR SPINE: ICD-10-CM

## 2024-05-20 PROCEDURE — 97112 NEUROMUSCULAR REEDUCATION: CPT

## 2024-05-20 PROCEDURE — 97110 THERAPEUTIC EXERCISES: CPT

## 2024-05-20 NOTE — PROGRESS NOTES
Physical Therapy Daily Treatment Note               [address]                                                                                                                                             [place] [address]    Patient: Juan Mohan   : 1980  Referring practitioner: Sachin Johns MD  Date of Initial Visit: Type: THERAPY  Noted: 2024  Today's Date: 2024  Patient seen for 7 sessions       Visit Diagnoses:    ICD-10-CM ICD-9-CM   1. Acute midline low back pain without sciatica  M54.50 724.2   2. Decreased range of motion of intervertebral discs of lumbar spine  M53.86 724.9       Subjective Evaluation    History of Present Illness    Subjective comment: Pt reports that he had increased aching in (R) hip after last treatment.  It has cleared up now.       Objective   See Exercise, Manual, and Modality Logs for complete treatment.       Assessment & Plan       Assessment  Assessment details: Pt completed treatment with no c/o pain in low back.  He tolerated the addition of resisted dead bugs the reintroduction of prior deferred exercises with no issues.  Plan to progress dynamic core stability as tolerated.          Timed:         Manual Therapy:    0     mins  43042;     Therapeutic Exercise:    30     mins  56519;     Neuromuscular Bing:    15    mins  28997;    Therapeutic Activity:     0     mins  98269;     Gait Trainin     mins  38915;     Ultrasound:     0     mins  27102;    E Stim                            0    mins   04652( [id number])  Work Og/Cond      0    mins   78612        Timed Treatment:   45   mins   Total Treatment:     45   mins    Adonay Corrales PTA  [place] License: [id number]

## 2024-06-05 ENCOUNTER — TREATMENT (OUTPATIENT)
Dept: PHYSICAL THERAPY | Facility: CLINIC | Age: 44
End: 2024-06-05
Payer: COMMERCIAL

## 2024-06-05 DIAGNOSIS — M54.50 ACUTE MIDLINE LOW BACK PAIN WITHOUT SCIATICA: Primary | ICD-10-CM

## 2024-06-05 DIAGNOSIS — M53.86 DECREASED RANGE OF MOTION OF INTERVERTEBRAL DISCS OF LUMBAR SPINE: ICD-10-CM

## 2024-06-05 NOTE — PROGRESS NOTES
"Physical Therapy Daily Treatment Note               [address]                                                                                                                                             [place] [address]    Patient: Juan Mohan   : 1980  Referring practitioner: Sachin Johns MD  Date of Initial Visit: Type: THERAPY  Noted: 2024  Today's Date: 2024  Patient seen for 8 sessions       Visit Diagnoses:    ICD-10-CM ICD-9-CM   1. Acute midline low back pain without sciatica  M54.50 724.2   2. Decreased range of motion of intervertebral discs of lumbar spine  M53.86 724.9       Subjective Evaluation    History of Present Illness    Subjective comment: Pt reports that his low back is \" ok for the most part\".  Having some tightness in thoracic back region.       Objective   See Exercise, Manual, and Modality Logs for complete treatment.       Assessment & Plan       Assessment  Assessment details: Pt completed treatment with no provocation of lumbar pain. Pt benefited from vc for exercise performance. Progression deferred today due to pt not having therapy for 2 weeks.  Continue progression nxt treatment.          Timed:         Manual Therapy:    0     mins  70810;     Therapeutic Exercise:    29     mins  18887;     Neuromuscular Bing:    12    mins  48512;    Therapeutic Activity:     0     mins  36279;     Gait Trainin     mins  70095;     Ultrasound:     0     mins  42195;    E Stim                            0    mins   68309( g0283)  Work Og/Cond      0    mins   98323        Timed Treatment:   41   mins   Total Treatment:     41   mins    Adonay Corrales PTA  [place] License: [id number]  "

## 2024-07-22 ENCOUNTER — TRANSCRIBE ORDERS (OUTPATIENT)
Dept: PHYSICAL THERAPY | Facility: CLINIC | Age: 44
End: 2024-07-22
Payer: COMMERCIAL

## 2024-07-22 DIAGNOSIS — M77.12 LATERAL EPICONDYLITIS OF LEFT ELBOW: Primary | ICD-10-CM

## 2024-07-26 ENCOUNTER — TELEPHONE (OUTPATIENT)
Dept: SURGERY | Facility: CLINIC | Age: 44
End: 2024-07-26
Payer: COMMERCIAL

## 2024-07-26 NOTE — TELEPHONE ENCOUNTER
Spoke with the patient and he was rescheduled to 9/20/2024 ARRIVAL OF 9AM. Patient aware and agrees. New information will be mailed out to the patient

## 2024-08-11 DIAGNOSIS — I49.3 SYMPTOMATIC PVCS: ICD-10-CM

## 2024-08-11 DIAGNOSIS — R00.2 PALPITATIONS: ICD-10-CM

## 2024-08-12 RX ORDER — METOPROLOL SUCCINATE 50 MG/1
50 TABLET, EXTENDED RELEASE ORAL DAILY
Qty: 90 TABLET | Refills: 0 | Status: SHIPPED | OUTPATIENT
Start: 2024-08-12

## 2024-08-19 ENCOUNTER — TREATMENT (OUTPATIENT)
Dept: PHYSICAL THERAPY | Facility: CLINIC | Age: 44
End: 2024-08-19
Payer: COMMERCIAL

## 2024-08-19 DIAGNOSIS — M77.12 LATERAL EPICONDYLITIS OF LEFT ELBOW: ICD-10-CM

## 2024-08-19 DIAGNOSIS — M25.522 LEFT ELBOW PAIN: Primary | ICD-10-CM

## 2024-08-19 PROCEDURE — 97110 THERAPEUTIC EXERCISES: CPT | Performed by: PHYSICAL THERAPIST

## 2024-08-19 PROCEDURE — 97530 THERAPEUTIC ACTIVITIES: CPT | Performed by: PHYSICAL THERAPIST

## 2024-08-19 PROCEDURE — 97161 PT EVAL LOW COMPLEX 20 MIN: CPT | Performed by: PHYSICAL THERAPIST

## 2024-08-19 PROCEDURE — 97014 ELECTRIC STIMULATION THERAPY: CPT | Performed by: PHYSICAL THERAPIST

## 2024-08-19 NOTE — PROGRESS NOTES
Physical Therapy Initial Evaluation and Plan of Care  [address], [address], [place] [address]    Patient: Juan Mohan   : 1980  Diagnosis/ICD-10 Code:  Left elbow pain [M25.522]  Referring practitioner: No ref. provider found    Subjective Evaluation    History of Present Illness  Onset date: nearly 1 year.  Mechanism of injury: Patient reports (L) elbow pain for close to a year which has worsened within the past 2 months.  He has pain with (L) hand AROM, gripping, and lifting even light items.  He saw Dr. Brooks in July who diagnosed him with tennis elbow.  He was prescribed an anti-inflammatory which he was not able to tolerate due to making him feel very drowsy. He notes he is unable to fully straighten his (L) elbow.  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 He has pain and difficulty with pushing > pulling activities. He had some pain last night radiating into his (L) thumb.  He does have some sleep interruption due to the pain.      PMH notable for (L) distal radius and ulna fx with closed reduction about 25 years ago.    Of note, patient has an [employer] physical fitness test coming up in a few weeks he is concerned about his (L) elbow tolerating.  It consists of a great deal of pushups and rowing.      Patient Occupation: [employer] Quality of life: good    Pain  Current pain ratin  At best pain ratin  At worst pain ratin  Location: (L) lateral elbow, proximal ulnar forearm  Quality: radiating and dull ache  Relieving factors: change in position and rest (sleeping with elbow straighter, Icy hot and topical creams)  Aggravating factors: lifting, movement, sleeping and repetitive movement  Progression: worsening    Hand dominance: right    Diagnostic Tests  X-ray: normal (nL (L) elbow)    Patient Goals  Patient goals for therapy: decreased pain  Patient goal: get rid of pain, straighten elbow, be able to use (L) arm normally           Subjective  Questionnaire: QuickDASH: 47.73%    Objective          Tenderness     Left Elbow   Tenderness in the lateral epicondyle and radial head.     Left Wrist/Hand   Tenderness in the lateral epicondyle.     Additional Tenderness Details  Mod (+) TTP (L) common wrist extensor tendon at elbow    Active Range of Motion     Left Elbow   Flexion: 108 degrees with pain  Extension: 37 degrees with pain  Forearm supination: 66 degrees with pain  Forearm pronation: 82 degrees with pain    Right Elbow   Flexion: 141 degrees   Extension: 9 degrees   Forearm supination: 79 degrees   Forearm pronation: 76 degrees     Left Wrist   Wrist flexion: 61 (stretch) degrees   Wrist extension: 56 degrees with pain  Radial deviation: 20 degrees with pain  Ulnar deviation: 36 degrees with pain      Right Wrist   Wrist flexion: 62 degrees   Wrist extension: 57 degrees   Radial deviation: 18 degrees   Ulnar deviation: 31 degrees     Additional Active Range of Motion Details  (L) FA supination least painful    Strength/Myotome Testing     Left Elbow   Flexion: 4  Extension: 4-  Forearm supination: 4  Forearm pronation: 4    Right Elbow   Flexion: 5  Extension: 5  Forearm supination: 5  Forearm pronation: 5    Left Wrist/Hand   Wrist extension: 3+ (pain-limited)  Wrist flexion: 4  Radial deviation: 3+ (pain-limited)  Ulnar deviation: 4     (2nd hand position)     Trial 1: 12 lbs    Trial 2: 11 lbs    Trial 3: 10 lbs    Average: 11 lbs    Right Wrist/Hand   Wrist extension: 5  Wrist flexion: 5  Radial deviation: 4+  Ulnar deviation: 4+     (2nd hand position)     Trial 1: 72 lbs    Trial 2: 87 lbs    Trial 3: 82 lbs    Average: 80.33 lbs    Tests     Left Elbow   Positive Cozen's.           Assessment & Plan       Assessment  Impairments: abnormal or restricted ROM, activity intolerance, impaired physical strength, lacks appropriate home exercise program and pain with function   Functional limitations: carrying objects, lifting, sleeping,  pulling, pushing, uncomfortable because of pain and unable to perform repetitive tasks (Gripping activities of the (L) UE)  Assessment details: Patient is a 44 y.o male who reports an approximately 1 year history of (L) elbow pain with a worsening of symptoms within the past 2 months. (L) elbow radiographs were unremarkable and patient was unable to tolerate a prescribed anti-inflammatory medication. He has become progressively more limited with (L) UE AROM, gripping, carrying, and pushing and pulling tasks over the past 2 months, rating symptoms 4-7/10 at the proximal lateral dorsal forearm region.  He exhibits localized tenderness, decreased and painful (L) elbow/forearm/wrist/hand AROM, decreased (L) UE strength including , and positive special testing for (L) lateral epicondylitis.  His QuickDASH score is 47.73% indicating a significant perceived level of functional limitation.  He will benefit from skilled PT services to address these deficits and assist patient in painfree return to all ADLs, functional activities, and work tasks.  Prognosis: good    Goals  Plan Goals: STGs: to be met in 6 weeks  1. Patient will be independent with initial HEP  2. Patient will report improved (L) elbow symptoms 2-4/10 for improved ADL tolerance  3. Patient will demonstrate improved (L) elbow extension AROM equal to (R) for improved joint mobility and positional tolerance  4. Patient will demonstrate improved (L) UE forearm and wrist AROM WNL for improved ADL performance  5. Patient will exhibit min (+) TTP over 50% more localized area (L) UE as evidence of resolving inflammation    LTGs: to be met in 12 weeks  1. Patient will be independent with progressed HEP  2. Patient will report resolution of (L) UE symptoms for normal functional use of (L) UE  3. Patient will demonstrate improved (L) UE  >/= 70# for improved functional use of (L) UE for carrying, pushing, and pulling tasks  4. Patient will exhibit negative  special testing for (L) lateral epicondylitis  5. Patient will have improved QuickDASH score </= 10% for subjective evidence of functional improvement  6. Patient will successfully complete [employer] test symptom-free    Plan  Therapy options: will be seen for skilled therapy services  Planned modality interventions: cryotherapy, thermotherapy (hydrocollator packs) and electrical stimulation/Russian stimulation  Planned therapy interventions: ADL retraining, flexibility, functional ROM exercises, home exercise program, joint mobilization, manual therapy, soft tissue mobilization, strengthening, stretching and therapeutic activities  Frequency: 1x week  Duration in weeks: 12  Treatment plan discussed with: patient        Manual Therapy:         mins  97304;  Therapeutic Exercise:    20     mins  41999;     Neuromuscular Bing:        mins  87660;    Therapeutic Activity:     12     mins  28892;     Gait Training:           mins  51733;     Ultrasound:          mins  99837;    Electrical Stimulation:    15     mins  85851 ( );  Dry Needling          mins self-pay    Timed Treatment:   32   mins   Total Treatment:     59   mins    PT SIGNATURE: Barbara Jacob PT, DPT, OCS  Electronically signed by: Barbara Jacob PT, 08/19/24, 8:35 AM EDT  [place] License #[id number]     DATE TREATMENT INITIATED: 8/19/2024    Initial Certification  Certification Period: 8/19/2024 thru 11/16/2024  I certify that the therapy services are furnished while this patient is under my care.  The services outlined above are required by this patient, and will be reviewed every 90 days.     PHYSICIAN:                                             DATE:     Please sign and return via fax to [phone]. Thank you, [facility].

## 2024-08-26 ENCOUNTER — TREATMENT (OUTPATIENT)
Dept: PHYSICAL THERAPY | Facility: CLINIC | Age: 44
End: 2024-08-26
Payer: COMMERCIAL

## 2024-08-26 DIAGNOSIS — M77.12 LATERAL EPICONDYLITIS OF LEFT ELBOW: ICD-10-CM

## 2024-08-26 DIAGNOSIS — M25.522 LEFT ELBOW PAIN: Primary | ICD-10-CM

## 2024-08-26 PROCEDURE — 97110 THERAPEUTIC EXERCISES: CPT | Performed by: PHYSICAL THERAPIST

## 2024-08-26 PROCEDURE — 97014 ELECTRIC STIMULATION THERAPY: CPT | Performed by: PHYSICAL THERAPIST

## 2024-08-26 NOTE — PROGRESS NOTES
"   Physical Therapy Daily Treatment Note      [address], [address], [place] [id number]    Patient: Juan Mohan   : 1980  Referring practitioner: Sami Brooks MD  Date of Initial Visit: Type: THERAPY  Noted: 2024  Today's Date: 2024  Patient seen for 2 sessions         Visit Diagnoses:     ICD-10-CM ICD-9-CM   1. Left elbow pain  M25.522 719.42   2. Lateral epicondylitis of left elbow  M77.12 726.32         Subjective Evaluation    History of Present Illness    Subjective comment: My elbow is staying pretty sore.  The [estim treatment] seemed like it helped my elbow loosen up a little, be not as stiff. I have been doing the exercises at home. [Patient does note he has a hard time claudia L biceps mm].       Objective   See Exercise, Manual, and Modality Logs for complete treatment.   *Increased exercise reps as appropriate  *Initiated wrist flexion stretches (elbow straight and bent)  *Initiated eccentric wrist extension with 2#    Assessment & Plan       Assessment  Assessment details: In light of patient's report of difficulty activating and \"making a muscle\" (L) biceps mm, palpation of (L) biceps mm and surrounding area is unremarkable with seemingly intact distal biceps tendon, non-tender (L) biceps muscle belly, but moderately decreased muscle activation both isometrically and concentrically. There is no visible deformity of (L) biceps mm. Patient exhibits moderate lack of (L) elbow extension passively and actively.  Eccentric wrist extension activity is initiated with 2# weight to address (L) lateral epicondylitis symptoms.           Progress per Plan of Care         Timed:  Manual Therapy:         mins  05356;  Therapeutic Exercise:    33     mins  59766;     Neuromuscular Bing:        mins  62367;    Therapeutic Activity:          mins  91456;     Gait Training:           mins  44057;     Ultrasound:          mins  49203;    Untimed:  Electrical Stimulation:    15     " mins  96907 ( );  Mechanical Traction:         mins  85760;   Dry Needling              ___  mins   20561    Timed Treatment:   33   mins   Total Treatment:     48   mins    Barbara Jacob PT, DPT, [facility]  Physical Therapist  [place] License #[id number]  Electronically signed by: Barbara Jacob PT, 08/26/24, 11:07 AM EDT

## 2024-09-09 ENCOUNTER — TREATMENT (OUTPATIENT)
Dept: PHYSICAL THERAPY | Facility: CLINIC | Age: 44
End: 2024-09-09
Payer: COMMERCIAL

## 2024-09-09 DIAGNOSIS — M77.12 LATERAL EPICONDYLITIS OF LEFT ELBOW: ICD-10-CM

## 2024-09-09 DIAGNOSIS — M25.522 LEFT ELBOW PAIN: Primary | ICD-10-CM

## 2024-09-09 PROCEDURE — 97014 ELECTRIC STIMULATION THERAPY: CPT | Performed by: PHYSICAL THERAPIST

## 2024-09-09 PROCEDURE — 97110 THERAPEUTIC EXERCISES: CPT | Performed by: PHYSICAL THERAPIST

## 2024-09-09 NOTE — PROGRESS NOTES
Physical Therapy Daily Treatment Note      [address], [address], [place] [id number]    Patient: Juan Mohan   : 1980  Referring practitioner: Sami Brooks MD  Date of Initial Visit: Type: THERAPY  Noted: 2024  Today's Date: 2024  Patient seen for 3 sessions         Visit Diagnoses:     ICD-10-CM ICD-9-CM   1. Left elbow pain  M25.522 719.42   2. Lateral epicondylitis of left elbow  M77.12 726.32         Subjective Evaluation    History of Present Illness    Subjective comment: Arm/elbow isn't feeling really any better -- still very sore, stiff, painful.  Have been doing the exercises some but not all the time.       Objective   See Exercise, Manual, and Modality Logs for complete treatment.   *Increased exercise repetitions as appropriate    Assessment & Plan       Assessment  Assessment details: Patient as yet reports no notable improvement in (L) elbow/forearm symptoms.  His symptoms persist as vague and diffuse but become more defined and intense with specific movements and positions of the (L) UE.  Discussed differential diagnosis to include radial tunnel syndrome and partial tear of wrist extensor tendon(s).  Encouraged more frequent compliance with HEP, specifically eccentric wrist extension activity due to its effectiveness in addressing lateral epicondylitis symptoms.  Patient verbalizes understanding.          Progress per Plan of Care         Timed:  Manual Therapy:         mins  96849;  Therapeutic Exercise:    32     mins  29488;     Neuromuscular Bing:        mins  80876;    Therapeutic Activity:          mins  74358;     Gait Training:           mins  83299;     Ultrasound:          mins  10631;    Untimed:  Electrical Stimulation:    15     mins  17665 ( );  Mechanical Traction:         mins  23508;   Dry Needling              ___  mins   82773    Timed Treatment:   32   mins   Total Treatment:     47   mins    Barbara Jacob, PT, DPT, OCS  Physical  Therapist  [place] License #[id number]  Electronically signed by: Barbara Jacob PT, 09/09/24, 7:29 AM EDT

## 2024-09-18 ENCOUNTER — TELEPHONE (OUTPATIENT)
Dept: SURGERY | Facility: CLINIC | Age: 44
End: 2024-09-18
Payer: COMMERCIAL

## 2024-09-20 ENCOUNTER — HOSPITAL ENCOUNTER (OUTPATIENT)
Facility: HOSPITAL | Age: 44
Setting detail: HOSPITAL OUTPATIENT SURGERY
Discharge: HOME | End: 2024-09-20
Attending: STUDENT IN AN ORGANIZED HEALTH CARE EDUCATION/TRAINING PROGRAM | Admitting: STUDENT IN AN ORGANIZED HEALTH CARE EDUCATION/TRAINING PROGRAM
Payer: COMMERCIAL

## 2024-09-20 VITALS
HEIGHT: 72 IN | OXYGEN SATURATION: 97 % | RESPIRATION RATE: 16 BRPM | BODY MASS INDEX: 33.03 KG/M2 | DIASTOLIC BLOOD PRESSURE: 78 MMHG | WEIGHT: 243.83 LBS | HEART RATE: 79 BPM | TEMPERATURE: 97.7 F | SYSTOLIC BLOOD PRESSURE: 105 MMHG

## 2024-09-20 PROCEDURE — 25010000002 PROPOFOL 10 MG/ML EMULSION: Performed by: NURSE ANESTHETIST, CERTIFIED REGISTERED

## 2024-09-20 PROCEDURE — 25810000003 LACTATED RINGERS PER 1000 ML: Performed by: NURSE ANESTHETIST, CERTIFIED REGISTERED

## 2024-09-20 RX ORDER — SODIUM CHLORIDE, SODIUM LACTATE, POTASSIUM CHLORIDE, CALCIUM CHLORIDE 600; 310; 30; 20 MG/100ML; MG/100ML; MG/100ML; MG/100ML
30 INJECTION, SOLUTION INTRAVENOUS CONTINUOUS
Status: DISCONTINUED | OUTPATIENT
Start: 2024-09-20 | End: 2024-09-20 | Stop reason: HOSPADM

## 2024-09-20 RX ORDER — ONDANSETRON 4 MG/1
4 TABLET, ORALLY DISINTEGRATING ORAL ONCE AS NEEDED
Status: DISCONTINUED | OUTPATIENT
Start: 2024-09-20 | End: 2024-09-20 | Stop reason: HOSPADM

## 2024-09-20 RX ORDER — LIDOCAINE HYDROCHLORIDE 20 MG/ML
INJECTION, SOLUTION EPIDURAL; INFILTRATION; INTRACAUDAL; PERINEURAL AS NEEDED
Status: DISCONTINUED | OUTPATIENT
Start: 2024-09-20 | End: 2024-09-20 | Stop reason: SURG

## 2024-09-20 RX ORDER — PROPOFOL 10 MG/ML
VIAL (ML) INTRAVENOUS AS NEEDED
Status: DISCONTINUED | OUTPATIENT
Start: 2024-09-20 | End: 2024-09-20 | Stop reason: SURG

## 2024-09-20 RX ADMIN — PROPOFOL 100 MG: 10 INJECTION, EMULSION INTRAVENOUS at 11:01

## 2024-09-20 RX ADMIN — PROPOFOL 250 MCG/KG/MIN: 10 INJECTION, EMULSION INTRAVENOUS at 11:01

## 2024-09-20 RX ADMIN — LIDOCAINE HYDROCHLORIDE 100 MG: 20 INJECTION, SOLUTION EPIDURAL; INFILTRATION; INTRACAUDAL; PERINEURAL at 11:01

## 2024-09-20 RX ADMIN — SODIUM CHLORIDE, POTASSIUM CHLORIDE, SODIUM LACTATE AND CALCIUM CHLORIDE 30 ML/HR: 600; 310; 30; 20 INJECTION, SOLUTION INTRAVENOUS at 10:51

## 2024-09-21 DIAGNOSIS — I10 ESSENTIAL HYPERTENSION: Chronic | ICD-10-CM

## 2024-09-21 DIAGNOSIS — E78.5 DYSLIPIDEMIA: ICD-10-CM

## 2024-09-23 ENCOUNTER — TREATMENT (OUTPATIENT)
Dept: PHYSICAL THERAPY | Facility: CLINIC | Age: 44
End: 2024-09-23
Payer: COMMERCIAL

## 2024-09-23 DIAGNOSIS — M25.522 LEFT ELBOW PAIN: Primary | ICD-10-CM

## 2024-09-23 DIAGNOSIS — M77.12 LATERAL EPICONDYLITIS OF LEFT ELBOW: ICD-10-CM

## 2024-09-23 PROCEDURE — 97014 ELECTRIC STIMULATION THERAPY: CPT | Performed by: PHYSICAL THERAPIST

## 2024-09-23 PROCEDURE — 97110 THERAPEUTIC EXERCISES: CPT | Performed by: PHYSICAL THERAPIST

## 2024-09-25 ENCOUNTER — TELEPHONE (OUTPATIENT)
Dept: SURGERY | Facility: CLINIC | Age: 44
End: 2024-09-25
Payer: COMMERCIAL

## 2024-09-25 RX ORDER — ATORVASTATIN CALCIUM 20 MG/1
TABLET, FILM COATED ORAL
Qty: 90 TABLET | Refills: 1 | OUTPATIENT
Start: 2024-09-25

## 2024-09-25 RX ORDER — HYDROCHLOROTHIAZIDE 25 MG/1
25 TABLET ORAL DAILY
Qty: 90 TABLET | Refills: 1 | OUTPATIENT
Start: 2024-09-25

## 2024-09-30 ENCOUNTER — OFFICE VISIT (OUTPATIENT)
Dept: ORTHOPEDIC SURGERY | Facility: CLINIC | Age: 44
End: 2024-09-30
Payer: COMMERCIAL

## 2024-09-30 VITALS — HEIGHT: 72 IN | WEIGHT: 245.3 LBS | TEMPERATURE: 98.7 F | BODY MASS INDEX: 33.23 KG/M2

## 2024-09-30 DIAGNOSIS — M77.12 LATERAL EPICONDYLITIS OF LEFT ELBOW: Primary | ICD-10-CM

## 2024-09-30 PROCEDURE — 99213 OFFICE O/P EST LOW 20 MIN: CPT | Performed by: NURSE PRACTITIONER

## 2024-09-30 PROCEDURE — 20550 NJX 1 TENDON SHEATH/LIGAMENT: CPT | Performed by: NURSE PRACTITIONER

## 2024-09-30 PROCEDURE — 73070 X-RAY EXAM OF ELBOW: CPT | Performed by: NURSE PRACTITIONER

## 2024-09-30 RX ORDER — METHYLPREDNISOLONE ACETATE 80 MG/ML
80 INJECTION, SUSPENSION INTRA-ARTICULAR; INTRALESIONAL; INTRAMUSCULAR; SOFT TISSUE
Status: COMPLETED | OUTPATIENT
Start: 2024-09-30 | End: 2024-09-30

## 2024-09-30 RX ORDER — LIDOCAINE HYDROCHLORIDE 10 MG/ML
2 INJECTION, SOLUTION EPIDURAL; INFILTRATION; INTRACAUDAL; PERINEURAL
Status: COMPLETED | OUTPATIENT
Start: 2024-09-30 | End: 2024-09-30

## 2024-09-30 RX ADMIN — METHYLPREDNISOLONE ACETATE 80 MG: 80 INJECTION, SUSPENSION INTRA-ARTICULAR; INTRALESIONAL; INTRAMUSCULAR; SOFT TISSUE at 09:24

## 2024-09-30 RX ADMIN — LIDOCAINE HYDROCHLORIDE 2 ML: 10 INJECTION, SOLUTION EPIDURAL; INFILTRATION; INTRACAUDAL; PERINEURAL at 09:24

## 2024-09-30 NOTE — PROGRESS NOTES
Juan Mohan     : 1980     MRN: [id number]     DATE: 2024    Chief Complaints:  Left elbow pain    History of Present Illness:     44 y.o. male patient who presents for evaluation of the left elbow.  Patient is employed full-time in [employer].  The patient reports the symptoms began approximately 6 months.  Denies injury or precipitating factors.  Pain is described as moderate, intermittent and stabbing.  Reports limited mobility of the elbow in both flexion and extension which has improved somewhat with physical therapy.  The pain is associated with certain reaching and lifting movements.  Reports persistent issues despite ibuprofen, ice, TENS treatments, and 2 months of physical therapy.  The patient localizes the pain to the lateral aspect of the elbow.      Allergies:   Allergies   Allergen Reactions   • Codeine Anaphylaxis   • Keflex [Cephalexin] Anaphylaxis   • Penicillins Anaphylaxis       Home Medications:    Current Outpatient Medications:   •  amLODIPine (NORVASC) 10 MG tablet, Take 1 tablet by mouth Daily., Disp: 90 tablet, Rfl: 1  •  hydroCHLOROthiazide (HYDRODIURIL) 25 MG tablet, TAKE 1 TABLET BY MOUTH EVERY DAY, Disp: 90 tablet, Rfl: 1  •  metoprolol succinate XL (TOPROL-XL) 50 MG 24 hr tablet, TAKE 1 TABLET BY MOUTH EVERY DAY, Disp: 90 tablet, Rfl: 0  •  omeprazole (priLOSEC) 40 MG capsule, Take 1 capsule by mouth As Needed., Disp: , Rfl:   Past Medical History:   Diagnosis Date   • Colon polyp    • Cyst of prostate     NO PROBLEMS CURRENTLY   • Diverticulosis    • GERD (gastroesophageal reflux disease)    • Hernia, inguinal     right   • Hypertension      Past Surgical History:   Procedure Laterality Date   • APPENDECTOMY     • CLOSED REDUCTION Left     left arm   • COLONOSCOPY     • COLONOSCOPY N/A 2024    Procedure: COLONOSCOPY;  Surgeon: Sai Chan MD;  Location: [facility] ENDOSCOPY;  Service: General;  Laterality: N/A;  normal   • EPIDURAL BLOCK     • INGUINAL  HERNIA REPAIR Right 2022    Procedure: INGUINAL HERNIA REPAIR LAPAROSCOPIC WITH DAVINCI ROBOT;  Surgeon: Abelino Davison MD;  Location: [facility] OR;  Service: Robotics - DaVinci;  Laterality: Right;     Social History     Occupational History   • Not on file   Tobacco Use   • Smoking status: Former     Current packs/day: 0.00     Average packs/day: 0.5 packs/day for 10.0 years (5.0 ttl pk-yrs)     Types: Cigarettes     Start date: 1/3/2010     Quit date: 1/3/2020     Years since quittin.7   • Smokeless tobacco: Never   Vaping Use   • Vaping status: Never Used   Substance and Sexual Activity   • Alcohol use: Not Currently     Comment: occ   • Drug use: Never   • Sexual activity: Defer      Social History     Social History Narrative   • Not on file     Family History   Problem Relation Age of Onset   • Hypertension Mother    • Stroke Father    • Hypertension Brother    • Heart attack Paternal Grandmother    • Heart disease Paternal Grandmother    • Diabetes Paternal Grandmother    • Cancer Paternal Grandfather         liver   • Malig Hyperthermia Neg Hx        Review of Systems:      Constitutional: Denies fever, shaking or chills   Eyes: Denies change in visual acuity   HEENT: Denies nasal congestion or sore throat   Respiratory: Denies cough or shortness of breath   Cardiovascular: Denies chest pain or edema  Endocrine: Denies tremors, palpitations, intolerance of heat or cold, polyuria, polydipsia.  GI: Denies abdominal pain, nausea, vomiting, bloody stools or diarrhea  : Denies frequency, urgency, incontinence, retention, or nocturia.  Musculoskeletal: Denies numbness, tingling or loss of motor function except as above  Integument: Denies rash, lesion or ulceration   Neurologic: Denies headache or focal weakness, deficits  Heme: Denies spontaneous or excessive bleeding, epistaxis, hematuria, melena, fatigue, enlarged or tender lymph nodes.      All other pertinent positives and negatives as  noted above in HPI.    Physical Exam: 44 y.o. male    There were no vitals filed for this visit.  General:  Patient is awake and alert.  Appears in no acute distress or discomfort.    Psych:  Affect and demeanor are appropriate.    Eyes:  Conjunctiva and sclera appear grossly normal.  Eyes track well and EOM seem to be intact.    Ears:  No gross abnormalities.  Hearing adequate for the exam.    Cardiovascular:  Regular rate and rhythm.    Lungs:  Good chest expansion.  Breathing unlabored.    Extremities:  Left elbow skin appears benign.  No obvious lesions, swellings, masses or adenopathy.  Focal tenderness noted over the lateral epicondyle.  No tenderness over the radial tunnel or medial side.  Elbow motion is limited to roughly 30° shy of full extension and 20° shy of full flexion.  Passively, I can range him approximately 10° more in both extension and flexion, however, this is uncomfortable for him.  No evident instability.  Good strength with elbow flexion, extension, supination, pronation.  Pain at the lateral epicondyle with resisted wrist extension.  Good strength in the hand with , pinch, finger and thumb abduction.  Sensation is intact and subjectively normal.  Palpable radial pulse with good skin turgor and brisk capillary refill.    DIAGNOSTIC STUDIES    Xrays:  AP and lateral views of the left elbow are ordered by myself and reviewed to evaluate the patient's complaint.  No comparison films are immediately available.  The x-rays show no obvious acute abnormalities, lesions, masses, significant degenerative changes, or other concerning findings.    ASSESSMENT:  Left elbow lateral epicondylitis    PLAN:  We discussed options in detail, both surgical and non-surgical.  I have recommended that we start with a conservative approach.  We discussed all available conservative treatment options including activity modifications, bracing, anti-inflammatories, physical therapy, and injections.  I explained the  likely short-term benefits of cortisone injection and the associated risks.  We also talked about the available evidence on PRP which suggest that this could have up to an 80% success rate at 6 months.  The patient acknowledged understanding of this information. He has elected to try a cortisone injection today.  The risk, benefits, and alternatives to the injection were discussed.  He verbalized understanding and consented to proceed.  The injection was performed as noted below.  Additionally, I had him fitted for an elbow brace today.  I have given him a prescription for a transdermal pain/anti-inflammatory cream through [facility].  The risk and dosing instructions for this medication were discussed.   Patient to continue physical therapy.  I have given him some additional stretching exercises to hopefully progress his range of motion.  Patient may return to work with light duty restrictions including no pushing, no pulling, no lifting greater than 5 pounds with left arm.  I have requested he be allowed to postpone his ACFT at least until his follow-up appointment in 6 weeks.       Medium Joint Arthrocentesis: L elbow  Date/Time: 9/30/2024 9:24 AM  Consent given by: patient  Site marked: site marked  Timeout: Immediately prior to procedure a time out was called to verify the correct patient, procedure, equipment, support staff and site/side marked as required   Supporting Documentation  Indications: pain   Procedure Details  Location: elbow - L elbow (Lateral epicondyle)  Preparation: Patient was prepped and draped in the usual sterile fashion  Needle size: 25 G  Approach: anterolateral  Medications administered: 2 mL lidocaine PF 1% 1 %; 80 mg methylPREDNISolone acetate 80 MG/ML  Patient tolerance: patient tolerated the procedure well with no immediate complications      AANI Rader    09/30/2024    CC to Josy Vinson APRN

## 2024-10-03 ENCOUNTER — PATIENT ROUNDING (BHMG ONLY) (OUTPATIENT)
Dept: ORTHOPEDIC SURGERY | Facility: CLINIC | Age: 44
End: 2024-10-03
Payer: COMMERCIAL

## 2024-10-03 NOTE — PROGRESS NOTES
A [employer] Message has been sent to the patient for PATIENT ROUNDING with [facility]

## 2024-10-09 DIAGNOSIS — I10 ESSENTIAL HYPERTENSION: ICD-10-CM

## 2024-10-09 RX ORDER — AMLODIPINE BESYLATE 10 MG/1
10 TABLET ORAL DAILY
Qty: 90 TABLET | Refills: 0 | Status: SHIPPED | OUTPATIENT
Start: 2024-10-09

## 2024-10-16 ENCOUNTER — DOCUMENTATION (OUTPATIENT)
Dept: PHYSICAL THERAPY | Facility: CLINIC | Age: 44
End: 2024-10-16
Payer: COMMERCIAL

## 2024-11-05 ENCOUNTER — OFFICE VISIT (OUTPATIENT)
Dept: FAMILY MEDICINE CLINIC | Age: 44
End: 2024-11-05
Payer: COMMERCIAL

## 2024-11-05 VITALS
WEIGHT: 251 LBS | BODY MASS INDEX: 34 KG/M2 | OXYGEN SATURATION: 95 % | HEART RATE: 73 BPM | SYSTOLIC BLOOD PRESSURE: 130 MMHG | TEMPERATURE: 97.9 F | DIASTOLIC BLOOD PRESSURE: 86 MMHG | HEIGHT: 72 IN

## 2024-11-05 DIAGNOSIS — R00.2 PALPITATIONS: ICD-10-CM

## 2024-11-05 DIAGNOSIS — I10 ESSENTIAL HYPERTENSION: ICD-10-CM

## 2024-11-05 DIAGNOSIS — H61.90 LESION OF SKIN OF EAR: Primary | ICD-10-CM

## 2024-11-05 DIAGNOSIS — I49.3 SYMPTOMATIC PVCS: ICD-10-CM

## 2024-11-05 PROCEDURE — 99214 OFFICE O/P EST MOD 30 MIN: CPT | Performed by: NURSE PRACTITIONER

## 2024-11-05 RX ORDER — HYDROCHLOROTHIAZIDE 25 MG/1
25 TABLET ORAL DAILY
Qty: 90 TABLET | Refills: 2 | Status: SHIPPED | OUTPATIENT
Start: 2024-11-05

## 2024-11-05 RX ORDER — METOPROLOL SUCCINATE 50 MG/1
50 TABLET, EXTENDED RELEASE ORAL DAILY
Qty: 90 TABLET | Refills: 2 | Status: SHIPPED | OUTPATIENT
Start: 2024-11-05

## 2024-11-05 RX ORDER — AMLODIPINE BESYLATE 10 MG/1
10 TABLET ORAL DAILY
Qty: 90 TABLET | Refills: 2 | Status: SHIPPED | OUTPATIENT
Start: 2024-11-05

## 2024-11-05 NOTE — PROGRESS NOTES
Chief Complaint  Juan Mohan presents to [facility] for Hypertension (Follow up ) and Skin Problem (Spot on both ears, wanting referral to derm )    Subjective     History of Present Illness  Harjit presents today for follow up on hypertension.    Current medication treatment includes metoprolol succinate 50 mg, amlodipine 10 mg, and hydrochlorothiazide 12.5 mg, and is compliant with medications.   Side effects reported :  No  Home blood pressure monitoring readings reported as home BP checks: not checked  Medication changes are not recommended at this time .    He does have some areas on both ears that are scabs and dry  non healing   wants referral to derm      Assessment and Plan     Diagnoses and all orders for this visit:    1. Lesion of skin of ear (Primary)  Comments:  referral for eval  Orders:  -     Ambulatory Referral to Dermatology    2. Essential hypertension  Comments:  continue current treatment f/u 6 months  Orders:  -     amLODIPine (NORVASC) 10 MG tablet; Take 1 tablet by mouth Daily.  Dispense: 90 tablet; Refill: 2  -     hydroCHLOROthiazide 25 MG tablet; Take 1 tablet by mouth Daily.  Dispense: 90 tablet; Refill: 2    3. Palpitations  Comments:  continue current treatment   f/u 6 months  Orders:  -     metoprolol succinate XL (TOPROL-XL) 50 MG 24 hr tablet; Take 1 tablet by mouth Daily.  Dispense: 90 tablet; Refill: 2    4. Symptomatic PVCs  -     metoprolol succinate XL (TOPROL-XL) 50 MG 24 hr tablet; Take 1 tablet by mouth Daily.  Dispense: 90 tablet; Refill: 2            Follow Up   Return in about 6 months (around 5/5/2025) for Recheck.  Future Appointments   Date Time Provider Department Center   11/14/2024  9:50 AM Marimar Linder APRN [facility]   6/9/2025  8:15 AM Josy Vinson APRN [facility]       New Medications Ordered This Visit   Medications    amLODIPine (NORVASC) 10 MG tablet     Sig: Take 1 tablet by mouth Daily.     Dispense:   "90 tablet     Refill:  2    hydroCHLOROthiazide 25 MG tablet     Sig: Take 1 tablet by mouth Daily.     Dispense:  90 tablet     Refill:  2    metoprolol succinate XL (TOPROL-XL) 50 MG 24 hr tablet     Sig: Take 1 tablet by mouth Daily.     Dispense:  90 tablet     Refill:  2       Medications Discontinued During This Encounter   Medication Reason    omeprazole (priLOSEC) 40 MG capsule Non-compliance    hydroCHLOROthiazide (HYDRODIURIL) 25 MG tablet Reorder    metoprolol succinate XL (TOPROL-XL) 50 MG 24 hr tablet Reorder    amLODIPine (NORVASC) 10 MG tablet Reorder          Review of Systems    Objective     Vitals:    11/05/24 1549   BP: 130/86   BP Location: Right arm   Patient Position: Sitting   Cuff Size: Adult   Pulse: 73   Temp: 97.9 °F (36.6 °C)   TempSrc: Oral   SpO2: 95%   Weight: 114 kg (251 lb)   Height: 182.9 cm (72\")            Physical Exam  Vitals reviewed.   Constitutional:       Appearance: Normal appearance.   HENT:      Head: Normocephalic.   Eyes:      Pupils: Pupils are equal, round, and reactive to light.   Cardiovascular:      Rate and Rhythm: Normal rate and regular rhythm.      Heart sounds: No murmur heard.  Pulmonary:      Effort: Pulmonary effort is normal.      Breath sounds: Normal breath sounds.   Musculoskeletal:         General: Normal range of motion.   Skin:     Findings: Lesion (bilateral ears) present.   Neurological:      Mental Status: He is alert.   Psychiatric:         Mood and Affect: Mood normal.         Behavior: Behavior normal.               Result Review          Allergies   Allergen Reactions    Codeine Anaphylaxis    Keflex [Cephalexin] Anaphylaxis    Penicillins Anaphylaxis      Past Medical History:   Diagnosis Date    Colon polyp     Cyst of prostate     NO PROBLEMS CURRENTLY    Diverticulosis     GERD (gastroesophageal reflux disease)     Hernia, inguinal     right    Hypertension      Current Outpatient Medications   Medication Sig Dispense Refill    amLODIPine " (NORVASC) 10 MG tablet Take 1 tablet by mouth Daily. 90 tablet 2    hydroCHLOROthiazide 25 MG tablet Take 1 tablet by mouth Daily. 90 tablet 2    Ibuprofen 3 %, Gabapentin 10 %, Baclofen 2 %, lidocaine 4 % Apply 1-2 g topically to the appropriate area as directed 3 (Three) to 4 (Four) times daily. (Patient taking differently: Apply 1-2 g topically to the appropriate area as directed As Needed.) 90 g 5    metoprolol succinate XL (TOPROL-XL) 50 MG 24 hr tablet Take 1 tablet by mouth Daily. 90 tablet 2     No current facility-administered medications for this visit.     Past Surgical History:   Procedure Laterality Date    APPENDECTOMY      CLOSED REDUCTION Left     left arm    COLONOSCOPY      COLONOSCOPY N/A 9/20/2024    Procedure: COLONOSCOPY;  Surgeon: Sai Chan MD;  Location: [facility] ENDOSCOPY;  Service: General;  Laterality: N/A;  normal    EPIDURAL BLOCK      INGUINAL HERNIA REPAIR Right 02/28/2022    Procedure: INGUINAL HERNIA REPAIR LAPAROSCOPIC WITH DAVINCI ROBOT;  Surgeon: Abelino Davison MD;  Location: [facility] MAIN OR;  Service: Robotics - DaVinci;  Laterality: Right;      Health Maintenance Due   Topic Date Due    LIPID PANEL  02/22/2024      Immunization History   Administered Date(s) Administered    COVID-19 (MODERNA) 1st,2nd,3rd Dose Monovalent 08/12/2021, 09/22/2021    Flu Vaccine Quad PF >36MO 09/08/2016    Fluzone (or Fluarix & Flulaval for VFC) >6mos 10/13/2022, 02/13/2024    Fluzone Quad >6mos (Multi-dose) 09/08/2016    Influenza Inj MDCK Preserative Free 10/17/2024    Tdap 02/15/2024         Part of this note may be an electronic transcription/translation of spoken language to printed   text using the Dragon Dictation System.      ANAI Mckeon

## 2024-11-22 ENCOUNTER — TELEPHONE (OUTPATIENT)
Dept: FAMILY MEDICINE CLINIC | Age: 44
End: 2024-11-22
Payer: COMMERCIAL

## 2024-12-03 ENCOUNTER — TELEPHONE (OUTPATIENT)
Dept: FAMILY MEDICINE CLINIC | Age: 44
End: 2024-12-03
Payer: COMMERCIAL

## 2024-12-13 ENCOUNTER — LAB (OUTPATIENT)
Dept: LAB | Facility: HOSPITAL | Age: 44
End: 2024-12-13
Payer: COMMERCIAL

## 2024-12-13 DIAGNOSIS — Z13.6 SCREENING FOR CARDIOVASCULAR CONDITION: ICD-10-CM

## 2024-12-13 LAB
CHOLEST SERPL-MCNC: 226 MG/DL (ref 0–200)
HDLC SERPL-MCNC: 42 MG/DL (ref 40–60)
LDLC SERPL CALC-MCNC: 168 MG/DL (ref 0–100)
LDLC/HDLC SERPL: 3.95 {RATIO}
TRIGL SERPL-MCNC: 90 MG/DL (ref 0–150)
VLDLC SERPL-MCNC: 16 MG/DL (ref 5–40)

## 2024-12-13 PROCEDURE — 36415 COLL VENOUS BLD VENIPUNCTURE: CPT

## 2024-12-13 PROCEDURE — 80061 LIPID PANEL: CPT

## 2025-02-03 ENCOUNTER — OFFICE VISIT (OUTPATIENT)
Dept: ORTHOPEDIC SURGERY | Facility: CLINIC | Age: 45
End: 2025-02-03
Payer: COMMERCIAL

## 2025-02-03 VITALS — BODY MASS INDEX: 34.05 KG/M2 | WEIGHT: 251.4 LBS | TEMPERATURE: 98 F | HEIGHT: 72 IN

## 2025-02-03 DIAGNOSIS — M77.12 LATERAL EPICONDYLITIS OF LEFT ELBOW: ICD-10-CM

## 2025-02-03 DIAGNOSIS — M25.522 LEFT ELBOW PAIN: Primary | ICD-10-CM

## 2025-02-03 PROCEDURE — 20551 NJX 1 TENDON ORIGIN/INSJ: CPT | Performed by: NURSE PRACTITIONER

## 2025-02-03 RX ORDER — LIDOCAINE HYDROCHLORIDE 10 MG/ML
2 INJECTION, SOLUTION EPIDURAL; INFILTRATION; INTRACAUDAL; PERINEURAL
Status: COMPLETED | OUTPATIENT
Start: 2025-02-03 | End: 2025-02-03

## 2025-02-03 RX ORDER — METHYLPREDNISOLONE ACETATE 80 MG/ML
80 INJECTION, SUSPENSION INTRA-ARTICULAR; INTRALESIONAL; INTRAMUSCULAR; SOFT TISSUE
Status: COMPLETED | OUTPATIENT
Start: 2025-02-03 | End: 2025-02-03

## 2025-02-03 RX ADMIN — LIDOCAINE HYDROCHLORIDE 2 ML: 10 INJECTION, SOLUTION EPIDURAL; INFILTRATION; INTRACAUDAL; PERINEURAL at 16:34

## 2025-02-03 RX ADMIN — METHYLPREDNISOLONE ACETATE 80 MG: 80 INJECTION, SUSPENSION INTRA-ARTICULAR; INTRALESIONAL; INTRAMUSCULAR; SOFT TISSUE at 16:34

## 2025-02-03 NOTE — PROGRESS NOTES
Mr. Mohan comes in today for follow-up.  He reports the previous injection helped tremendously.  He says his symptoms recurred approximately 1 month ago.  He is continue to do exercises he learned in physical therapy, use the elbow strap, and apply topical pain reliever I prescribed for him.  The patient would like to get a repeat injection today.      The risks, benefits and alternatives were discussed and the patient consented.  I explained if his symptoms persist or worsen, we need to consider getting a MRI for further evaluation.  Going forward, the patient will follow-up as needed.    ANAI Rader    02/03/2025      Medium Joint Arthrocentesis: L elbow  Date/Time: 2/3/2025 4:34 PM  Consent given by: patient  Timeout: Immediately prior to procedure a time out was called to verify the correct patient, procedure, equipment, support staff and site/side marked as required   Supporting Documentation  Indications: pain   Procedure Details  Location: elbow - L elbow  Preparation: Patient was prepped and draped in the usual sterile fashion  Needle size: 22 G  Approach: Injection into the area of the common extensor tendon.  Medications administered: 80 mg methylPREDNISolone acetate 80 MG/ML; 2 mL lidocaine PF 1% 1 %  Patient tolerance: patient tolerated the procedure well with no immediate complications

## 2025-03-21 DIAGNOSIS — M77.12 LATERAL EPICONDYLITIS OF LEFT ELBOW: ICD-10-CM

## 2025-03-21 NOTE — TELEPHONE ENCOUNTER
History of Present Illness


H&P Date: 19


Chief Complaint: Shortness of breath


This is a 81-year-old white female admitted to hospital with shortness of 

breath.  Patient has not been feeling well for the past 4 days.  She has been 

short of breath for the past months but her symptoms got worse in the past few 

days.  She denies chest pain, no subjective fever no chills no abdominal pain no

nausea no vomiting.  She reports some dizziness but no loss of consciousness.  

She denies hematuria or dysuria but reports being treated with Cipro then 

Bactrim for an acute UTI.








Review of Systems





10 systems reviewed pertinent positive and negative findings as in HPI.  No 

chest pain no abdominal pain





Past Medical History


Past Medical History: Diabetes Mellitus, GERD/Reflux, Hyperlipidemia, 

Hypertension, Osteoarthritis (OA)


Additional Past Medical History / Comment(s): 19 with idiopathic 

pancreatitis, esophagitis, gastritis, hiatal hernia.     Other hx:  IDDM type 

II, possible vasospastic angina, sinus problems, arthritis in back, prone to 

UTIs.


History of Any Multi-Drug Resistant Organisms: None Reported


Past Surgical History: Cholecystectomy, Heart Catheterization, Tonsillectomy, 

Tubal Ligation


Additional Past Surgical History / Comment(s): EGD with bx 17,   2010 

cardiac cath-normal, bilateral cataract removal, colonoscopy.


Past Anesthesia/Blood Transfusion Reactions: No Reported Reaction


Additional Past Anesthesia/Blood Transfusion Reaction / Comment(s): Pt has 

clausterphobia.


Past Psychological History: Anxiety, Depression


Smoking Status: Former smoker


Past Alcohol Use History: Rare


Past Drug Use History: None Reported





- Past Family History


  ** Father


Family Medical History: Cancer


Additional Family Medical History / Comment(s): Father  at the age of 81 yrs

of lung cancer.  He was a smoker.





  ** Mother


Family Medical History: Cancer


Additional Family Medical History / Comment(s): Mother had cervical cancer.  She

 at the age of 53 from her lupus.





Medications and Allergies


                                Home Medications











 Medication  Instructions  Recorded  Confirmed  Type


 


Simvastatin [Zocor] 40 mg PO DAILY 14 History


 


LORazepam [Ativan] 0.5 mg PO HS PRN 17 History


 


Timolol [Betimol 0.5% Ophth Soln] 1 drop BOTH EYES DAILY 17 

History


 


Insulin NPH Hum/Reg Insulin Hm 24 unit SQ HS 19 History





[NovoLIN 70-30 100 UNIT/ML VIAL]    


 


Insulin NPH Hum/Reg Insulin Hm 35 unit SQ QAM 19 History





[NovoLIN 70-30 100 UNIT/ML VIAL]    


 


Magnesium 200 mg PO DAILY 19 History


 


Pantoprazole [Protonix] 40 mg PO BID 19 History


 


Pioglitazone HCl [Actos] 15 mg PO DAILY 19 History


 


Apixaban [Eliquis] 5 mg PO BID #60 tab 19 Rx


 


Methimazole [Tapazole] 5 mg PO DAILY #30 tab 19 Rx


 


Metoprolol Tartrate [Lopressor] 50 mg PO BID #60 tab 19 Rx








                                    Allergies











Allergy/AdvReac Type Severity Reaction Status Date / Time


 


latex Allergy  Rash/Hives Verified 19 10:42














Physical Exam


Vitals: 


                                   Vital Signs











  Temp Pulse Resp BP Pulse Ox


 


 19 12:39   43 L  16  119/51  96


 


 19 10:01  98.8 F  43 L  16  121/89  97








                                Intake and Output











 19





 22:59 06:59 14:59


 


Other:   


 


  Weight   79.379 kg











Constitutional: Mild distress secondary to shortness of breath





Eyes: Anicteric sclerae, PERRLA





ENMT: NC/AT





Neck:Supple, FROM, no masses, or JVD





Lungs: Decreased breath sounds bilateral basilar crackles, no wheezing 





Cardiovascular: Heart regular in rate and rhythm,  No murmurs, gallops, or rubs 

no peripheral edema





Abdominal: Soft Nontender, nom distended, no guarding, no rebound or  rigidity, 

Normoactive bowel sounds   No palpable mass 





Skin: Normal temperature, tone, texture, turgor, No induration No subcutaneous 

nodules, No rash, lesions, No ulcers





Extremities:No digital cyanosis No clubbing, Pedal pulses intact and  

symmetrical Radial pulses intact and symmetrical Normal gait and station, No 

calf tenderness


 


Psychiatric: Alert and oriented to person, place and time, Appropriate affect 

Intact judgement   


      


Neuro: Muscles Strength 5/5 in all 4 extremities, Sensation to light touch 

grossly present throughout, Cranial nerves II-XII grossly intact. No focal 

sensory deficits








Results


CBC & Chem 7: 


                                 19 10:10





                                 19 10:10


Labs: 


                  Abnormal Lab Results - Last 24 Hours (Table)











  19 Range/Units





  10:10 10:10 10:10 


 


Hgb   9.7 L   (11.4-16.0)  gm/dL


 


Hct   32.3 L   (34.0-46.0)  %


 


MCV   65.5 L   (80.0-100.0)  fL


 


MCH   19.7 L   (25.0-35.0)  pg


 


MCHC   30.1 L   (31.0-37.0)  g/dL


 


RDW   17.8 H   (11.5-15.5)  %


 


PT    13.9 H  (9.0-12.0)  sec


 


INR    1.4 H  (<1.2)  


 


Potassium  5.2 H    (3.5-5.1)  mmol/L


 


Chloride  109 H    ()  mmol/L


 


Carbon Dioxide  19 L    (22-30)  mmol/L


 


BUN  53 H    (7-17)  mg/dL


 


Creatinine  1.98 H    (0.52-1.04)  mg/dL


 


Glucose  68 L    (74-99)  mg/dL


 


POC Glucose (mg/dL)     (75-99)  mg/dL














  19 Range/Units





  13:41 


 


Hgb   (11.4-16.0)  gm/dL


 


Hct   (34.0-46.0)  %


 


MCV   (80.0-100.0)  fL


 


MCH   (25.0-35.0)  pg


 


MCHC   (31.0-37.0)  g/dL


 


RDW   (11.5-15.5)  %


 


PT   (9.0-12.0)  sec


 


INR   (<1.2)  


 


Potassium   (3.5-5.1)  mmol/L


 


Chloride   ()  mmol/L


 


Carbon Dioxide   (22-30)  mmol/L


 


BUN   (7-17)  mg/dL


 


Creatinine   (0.52-1.04)  mg/dL


 


Glucose   (74-99)  mg/dL


 


POC Glucose (mg/dL)  60 L  (75-99)  mg/dL














Assessment and Plan


Plan: 


1.  Acute on chronic congestive heart failure, diastolic: Ejection fraction 55-

60%: Start Lasix 40 mg IV twice a day, monitor urine output and serum 

creatinine, cardiology consultation.  Continue on telemetry


2.  Acute kidney injury: Serum creatinine 1.9 likely associated with decreased 

perfusion in the setting of congestive heart failure, continue with IV diuretics

 and monitor serum creatinine, avoid nephrotoxins.  Nephrology consultation


3.  COPD exacerbation: Oxygen and bronchodilators as indicated


4.  Chronic atrial fibrillation with bradycardia: Hold metoprolol, cardiology 

consultation, check cardiac enzymes, continue eliquis 


5.  Diabetes type 2 with hyperglycemia, place on insulin sliding scale


6.  Anxiety: Continue Ativan as needed


7.  History of hypothyroidism: Continue methimazole


8.  GERD without esophagitis: Continue PPI


DVT prophylaxis: eliquis Received a fax requesting pain cream refill    Last refill: 9/30/24 with 5 refills    Last visit: 2/3/25    Next visit: None

## 2025-03-22 ENCOUNTER — HOSPITAL ENCOUNTER (EMERGENCY)
Facility: HOSPITAL | Age: 45
Discharge: HOME | End: 2025-03-22
Attending: EMERGENCY MEDICINE
Payer: COMMERCIAL

## 2025-03-22 ENCOUNTER — APPOINTMENT (OUTPATIENT)
Dept: GENERAL RADIOLOGY | Facility: HOSPITAL | Age: 45
End: 2025-03-22
Payer: COMMERCIAL

## 2025-03-22 VITALS
RESPIRATION RATE: 18 BRPM | HEART RATE: 67 BPM | OXYGEN SATURATION: 95 % | SYSTOLIC BLOOD PRESSURE: 121 MMHG | TEMPERATURE: 98 F | DIASTOLIC BLOOD PRESSURE: 85 MMHG

## 2025-03-22 DIAGNOSIS — M54.6 ACUTE LEFT-SIDED THORACIC BACK PAIN: Primary | ICD-10-CM

## 2025-03-22 PROCEDURE — 25010000002 KETOROLAC TROMETHAMINE PER 15 MG: Performed by: EMERGENCY MEDICINE

## 2025-03-22 PROCEDURE — 99283 EMERGENCY DEPT VISIT LOW MDM: CPT

## 2025-03-22 PROCEDURE — 63710000001 PREDNISONE PER 1 MG: Performed by: EMERGENCY MEDICINE

## 2025-03-22 PROCEDURE — 96372 THER/PROPH/DIAG INJ SC/IM: CPT

## 2025-03-22 PROCEDURE — 72110 X-RAY EXAM L-2 SPINE 4/>VWS: CPT

## 2025-03-22 PROCEDURE — 63710000001 ONDANSETRON ODT 4 MG TABLET DISPERSIBLE: Performed by: EMERGENCY MEDICINE

## 2025-03-22 PROCEDURE — 72072 X-RAY EXAM THORAC SPINE 3VWS: CPT

## 2025-03-22 RX ORDER — PREDNISONE 20 MG/1
60 TABLET ORAL ONCE
Status: COMPLETED | OUTPATIENT
Start: 2025-03-22 | End: 2025-03-22

## 2025-03-22 RX ORDER — METHYLPREDNISOLONE 4 MG/1
TABLET ORAL
Qty: 21 TABLET | Refills: 0 | Status: SHIPPED | OUTPATIENT
Start: 2025-03-22

## 2025-03-22 RX ORDER — CYCLOBENZAPRINE HCL 10 MG
10 TABLET ORAL ONCE
Status: COMPLETED | OUTPATIENT
Start: 2025-03-22 | End: 2025-03-22

## 2025-03-22 RX ORDER — KETOROLAC TROMETHAMINE 30 MG/ML
30 INJECTION, SOLUTION INTRAMUSCULAR; INTRAVENOUS ONCE
Status: COMPLETED | OUTPATIENT
Start: 2025-03-22 | End: 2025-03-22

## 2025-03-22 RX ORDER — ONDANSETRON 4 MG/1
4 TABLET, ORALLY DISINTEGRATING ORAL ONCE
Status: COMPLETED | OUTPATIENT
Start: 2025-03-22 | End: 2025-03-22

## 2025-03-22 RX ORDER — OXYCODONE AND ACETAMINOPHEN 5; 325 MG/1; MG/1
1 TABLET ORAL ONCE
Refills: 0 | Status: COMPLETED | OUTPATIENT
Start: 2025-03-22 | End: 2025-03-22

## 2025-03-22 RX ORDER — OXYCODONE AND ACETAMINOPHEN 5; 325 MG/1; MG/1
1 TABLET ORAL EVERY 6 HOURS PRN
Qty: 5 TABLET | Refills: 0 | Status: SHIPPED | OUTPATIENT
Start: 2025-03-22

## 2025-03-22 RX ORDER — CYCLOBENZAPRINE HCL 10 MG
10 TABLET ORAL 3 TIMES DAILY PRN
Qty: 30 TABLET | Refills: 0 | Status: SHIPPED | OUTPATIENT
Start: 2025-03-22

## 2025-03-22 RX ADMIN — PREDNISONE 60 MG: 20 TABLET ORAL at 14:13

## 2025-03-22 RX ADMIN — ONDANSETRON 4 MG: 4 TABLET, ORALLY DISINTEGRATING ORAL at 15:39

## 2025-03-22 RX ADMIN — KETOROLAC TROMETHAMINE 30 MG: 30 INJECTION, SOLUTION INTRAMUSCULAR at 14:13

## 2025-03-22 RX ADMIN — CYCLOBENZAPRINE HYDROCHLORIDE 10 MG: 10 TABLET, FILM COATED ORAL at 14:13

## 2025-03-22 RX ADMIN — OXYCODONE AND ACETAMINOPHEN 1 TABLET: 5; 325 TABLET ORAL at 14:13

## 2025-03-22 NOTE — Clinical Note
[facility] EMERGENCY DEPARTMENT  [address] [facility] [address]  Phone: [phone]    Juan Mohan was seen and treated in our emergency department on 3/22/2025.  He may return to work on 03/23/2025.  Patient seen for acute back injury.  Avoid heavy lifting over 10 pounds for 2 weeks or until cleared by physician.       Thank you for choosing [facility].    Tony Rob MD

## 2025-03-22 NOTE — ED PROVIDER NOTES
EMERGENCY DEPARTMENT ENCOUNTER  Room Number:  23/23  PCP: Josy Vinson APRN  Independent Historians: Patient      HPI:  Chief Complaint: had concerns including Back Pain.     A complete HPI/ROS/PMH/PSH/SH/FH are unobtainable due to: Nothing      Context: Juan Mohan is a 45 y.o. male with a medical history of lumbar disc herniation who presents to the ED c/o acute mid to low back pain on the left side.  He states that he was carrying a heavy tote down some steps.  He sat down and then went to pick it up again and he felt a pop in his back and felt immediate onset of pain in his left mid back.  The pain gradually increased.  He had planned to come to the ER but he had extreme exacerbation of pain with any movement therefore he called EMS.  He denies bowel or bladder incontinence.  He denies fever or chills.  He has had no tingling, numbness, weakness in his lower extremities.  He states that about 1 year ago he had similar symptoms and was found to have a disc bulge or tear in his lower back at L4-5.      Review of prior external notes (non-ED) -and- Review of prior external test results outside of this encounter: See ED course below        PAST MEDICAL HISTORY  Active Ambulatory Problems     Diagnosis Date Noted    Abnormal EKG 02/27/2021    Essential hypertension 02/27/2021    Dyslipidemia 02/27/2021    Constipation 07/08/2021    Prostate irregularity 07/15/2021    Spleen enlargement 07/15/2021    Renal cyst 07/15/2021    Inguinal hernia 02/17/2022    Diverticulosis 02/22/2023    Right shoulder pain 04/27/2023    High cholesterol 04/24/2023    Acid reflux 04/24/2023    Fatty liver 09/12/2023    Melena 03/13/2024    Low back pain 03/19/2024    Hyperglycemia 03/20/2024     Resolved Ambulatory Problems     Diagnosis Date Noted    Precordial pain 02/27/2021    Left lower quadrant abdominal tenderness without rebound tenderness 07/08/2021    Diverticulitis 08/22/2022    Fungal infection of nail 08/22/2022     Pain of toe of right foot 2022    Acute gout involving toe of right foot 2022     Past Medical History:   Diagnosis Date    Colon polyp     Cyst of prostate     GERD (gastroesophageal reflux disease)     Hernia, inguinal     Hypertension          PAST SURGICAL HISTORY  Past Surgical History:   Procedure Laterality Date    APPENDECTOMY      CLOSED REDUCTION Left     left arm    COLONOSCOPY      COLONOSCOPY N/A 2024    Procedure: COLONOSCOPY;  Surgeon: Sai Chan MD;  Location: [facility] ENDOSCOPY;  Service: General;  Laterality: N/A;  normal    EPIDURAL BLOCK      INGUINAL HERNIA REPAIR Right 2022    Procedure: INGUINAL HERNIA REPAIR LAPAROSCOPIC WITH DAVINCI ROBOT;  Surgeon: Abelino Davison MD;  Location: [facility] MAIN OR;  Service: Robotics - DaVinci;  Laterality: Right;         FAMILY HISTORY  Family History   Problem Relation Age of Onset    Hypertension Mother     Stroke Father     Hypertension Brother     Heart attack Paternal Grandmother     Heart disease Paternal Grandmother     Diabetes Paternal Grandmother     Cancer Paternal Grandfather         liver    Malig Hyperthermia Neg Hx          SOCIAL HISTORY  Social History     Socioeconomic History    Marital status:    Tobacco Use    Smoking status: Former     Current packs/day: 0.00     Average packs/day: 0.5 packs/day for 10.0 years (5.0 ttl pk-yrs)     Types: Cigarettes     Start date: 1/3/2010     Quit date: 1/3/2020     Years since quittin.2    Smokeless tobacco: Never   Vaping Use    Vaping status: Never Used   Substance and Sexual Activity    Alcohol use: Not Currently     Comment: occ    Drug use: Never    Sexual activity: Defer         ALLERGIES  Codeine, Keflex [cephalexin], and Penicillins      REVIEW OF SYSTEMS  Review of all 14 systems is negative other than stated in the HPI above.      PHYSICAL EXAM    I have reviewed the triage vital signs and nursing notes.    ED Triage Vitals [25 1242]   Temp Heart  Rate Resp BP SpO2   98 °F (36.7 °C) 86 18 139/98 98 %      Temp src Heart Rate Source Patient Position BP Location FiO2 (%)   -- -- -- -- --         GENERAL: awake and alert, no acute distress  HENT: Normocephalic, atraumatic  EYES: no scleral icterus  CV: regular rhythm, regular rate  RESPIRATORY: normal effort  ABDOMEN: soft, nondistended, nontender throughout  MUSCULOSKELETAL: no deformity, no midline T or L-spine tenderness, mild point tenderness over the left thoracolumbar muscles  NEURO: alert, moves all extremities, follows commands.  Cranial nerves II through XII intact, speech fluent and clear.  5/5 strength with hip flexion, knee flex/ext, plantarflexion, and dorsiflexion of the feet BLE. Normal sensation to light touch and pin prick throughout BLE. 2+ DTR at the knee BLE.    PSYCH: calm, cooperative  SKIN: Warm, dry          LAB RESULTS  No results found for this or any previous visit (from the past 24 hours).    The above labs were ordered by me and independently reviewed by me.     RADIOLOGY  XR Spine Thoracic 3 View  XR Spine Thoracic 3 View, XR Spine Lumbar Complete 4+VW  Result Date: 3/22/2025  XR SPINE THORACIC 3 VW-, XR SPINE LUMBAR COMPLETE 4+VW-  HISTORY: 45-year-old male felt a pop when lifting a heavy box. Mid to low back pain.  FINDINGS: Vertebral body height at the mid and lower thoracic spine, and lumbar spine is within normal limits. T1-T3 are suboptimally seen. There is no convincing evidence for an acute fracture and there is no spondylolisthesis or significant spondylosis.  This report was finalized on 3/22/2025 3:46 PM by Dr. Radha Victoria M.D on Workstation: [id number]        The above radiology studies were ordered by me.  See ED course for independent interpretations.     MEDICATIONS GIVEN IN ER  Medications   predniSONE (DELTASONE) tablet 60 mg (60 mg Oral Given 3/22/25 1413)   cyclobenzaprine (FLEXERIL) tablet 10 mg (10 mg Oral Given 3/22/25 1413)   oxyCODONE-acetaminophen  (PERCOCET) 5-325 MG per tablet 1 tablet (1 tablet Oral Given 3/22/25 1413)   ketorolac (TORADOL) injection 30 mg (30 mg Intramuscular Given 3/22/25 1413)   ondansetron ODT (ZOFRAN-ODT) disintegrating tablet 4 mg (4 mg Oral Given 3/22/25 1539)         ORDERS PLACED DURING THIS VISIT:  Orders Placed This Encounter   Procedures    XR Spine Thoracic 3 View    XR Spine Lumbar Complete 4+VW    Ambulatory Referral to Neurosurgery (All except Lag)         OUTPATIENT MEDICATION MANAGEMENT:  No current Epic-ordered facility-administered medications on file.     Current Outpatient Medications Ordered in Epic   Medication Sig Dispense Refill    amLODIPine (NORVASC) 10 MG tablet Take 1 tablet by mouth Daily. 90 tablet 2    cyclobenzaprine (FLEXERIL) 10 MG tablet Take 1 tablet by mouth 3 (Three) Times a Day As Needed for Muscle Spasms. 30 tablet 0    hydroCHLOROthiazide 25 MG tablet Take 1 tablet by mouth Daily. 90 tablet 2    Ibuprofen 3 %, Gabapentin 10 %, Baclofen 2 %, lidocaine 4 % Apply 1-2 g topically to the appropriate area as directed 3 (Three) to 4 (Four) times daily. 90 g 5    methylPREDNISolone (MEDROL) 4 MG dose pack Take as directed on package instructions. 21 tablet 0    metoprolol succinate XL (TOPROL-XL) 50 MG 24 hr tablet Take 1 tablet by mouth Daily. 90 tablet 2    oxyCODONE-acetaminophen (PERCOCET) 5-325 MG per tablet Take 1 tablet by mouth Every 6 (Six) Hours As Needed for Moderate Pain. 5 tablet 0         PROCEDURES  Procedures            PROGRESS, DATA ANALYSIS, CONSULTS, AND MEDICAL DECISION MAKING  All labs have been independently interpreted by me.  All radiology studies have been reviewed by me. All EKG's have been independently viewed and interpreted by me.  Discussion below represents my analysis of pertinent findings related to patient's condition, differential diagnosis, treatment plan and final disposition.    Differential diagnosis includes but is not limited to:  Compression fracture  Disc  herniation  Muscle strain      Clinical Scores:                  ED Course as of 03/22/25 1625   Sat Mar 22, 2025   1403 I reviewed discharge summary from 3/21/2024.  He had an MRI of the lumbar spine and L4-5 there is mild left and mild to moderate right facet overgrowth as well as a posterior annular fissure or tear with a small posterior central disc protrusion.  He had an epidural performed. [JR]   1515 X-rays of the thoracic and lumbar spine independently interpreted PACS.  I do not see evidence of acute compression deformity.  Disc spaces appear well-preserved. [JR]   1624 Patient reassessed and informed of reassuring x-rays.  I explained that there is no indication for emergent MRI at this time based on his normal neurologic exam.  I am somewhat concerned for thoracic radiculopathy given the history and exam.  I recommended a trial of oral steroids, muscle relaxers, short course of Percocet as needed for pain.  Winston reviewed.  Neurosurgery referral placed.  Return precautions were discussed. [JR]      ED Course User Index  [JR] Tony Rob MD             AS OF 16:25 EDT VITALS:    BP - 121/85  HR - 67  TEMP - 98 °F (36.7 °C)  O2 SATS - 95%    COMPLEXITY OF CARE        Chronic or social conditions impacting patient care (Social Determinants of Health):     DIAGNOSIS  Final diagnoses:   Acute left-sided thoracic back pain           DISPOSITION  DISCHARGE    Patient discharged in stable condition.    Reviewed implications of results, diagnosis, meds, responsibility to follow up, warning signs and symptoms of possible worsening, potential complications and reasons to return to ER.    Patient/Family voiced understanding of above instructions.    Discussed plan for discharge, as there is no emergent indication for admission. Patient referred to primary care provider for BP management due to today's BP. Pt/family is agreeable and understands need for follow up and repeat testing.  Pt is aware that  discharge does not mean that nothing is wrong but it indicates no emergency is present that requires admission and they must continue care with follow-up as given below or physician of their choice.     FOLLOW-UP  Alisson Josy, APRN  [address] [facility] [address]  [facility] [id number] [phone]    Schedule an appointment as soon as possible for a visit       Jo Phipps MD  [address] [facility]  [address]  [facility] [id number] [phone]    Schedule an appointment as soon as possible for a visit            Medication List        New Prescriptions      cyclobenzaprine 10 MG tablet  Commonly known as: FLEXERIL  Take 1 tablet by mouth 3 (Three) Times a Day As Needed for Muscle Spasms.     methylPREDNISolone 4 MG dose pack  Commonly known as: MEDROL  Take as directed on package instructions.     oxyCODONE-acetaminophen 5-325 MG per tablet  Commonly known as: PERCOCET  Take 1 tablet by mouth Every 6 (Six) Hours As Needed for Moderate Pain.               Where to Get Your Medications        These medications were sent to [facility] - [address], [facility] [address]      Hours: Monday to Friday 7 AM to 6 PM, Saturday & Sunday 8 AM to 4:30 PM (Closed 12 PM to 12:30 PM) Phone: [phone]   cyclobenzaprine 10 MG tablet  methylPREDNISolone 4 MG dose pack  oxyCODONE-acetaminophen 5-325 MG per tablet             Prescription drug monitoring program review: LAISHA reviewed by Tony Rob MD   N/A and LAISHA query complete and reviewed. Treatment plan to include limited course of prescribed controlled substance. Risks including addiction, benefits, and alternatives presented to patient.      Please note that portions of this document were completed with a voice recognition program.    Note Disclaimer: At [facility], we believe that sharing information builds trust and better relationships. You are receiving this note because you recently visited [facility]. It is possible  you will see health information before a provider has talked with you about it. This kind of information can be easy to misunderstand. To help you fully understand what it means for your health, we urge you to discuss this note with your provider.         Tony Rob MD  03/22/25 [id number]

## 2025-03-22 NOTE — ED NOTES
Pt arrives via ems from home for a pop and sharp pain felt to the left side of his back after lifting up a heavy tote.

## 2025-04-02 ENCOUNTER — OFFICE VISIT (OUTPATIENT)
Dept: FAMILY MEDICINE CLINIC | Age: 45
End: 2025-04-02
Payer: COMMERCIAL

## 2025-04-02 VITALS
WEIGHT: 248 LBS | BODY MASS INDEX: 33.59 KG/M2 | HEART RATE: 104 BPM | SYSTOLIC BLOOD PRESSURE: 139 MMHG | HEIGHT: 72 IN | DIASTOLIC BLOOD PRESSURE: 90 MMHG | TEMPERATURE: 97.8 F | OXYGEN SATURATION: 98 %

## 2025-04-02 DIAGNOSIS — S39.012D STRAIN OF LUMBAR REGION, SUBSEQUENT ENCOUNTER: Primary | ICD-10-CM

## 2025-04-02 RX ORDER — OSELTAMIVIR PHOSPHATE 75 MG/1
1 CAPSULE ORAL EVERY 12 HOURS SCHEDULED
COMMUNITY
Start: 2025-01-24

## 2025-04-02 RX ORDER — METOPROLOL TARTRATE 25 MG/1
25 TABLET, FILM COATED ORAL
COMMUNITY

## 2025-04-02 RX ORDER — NYSTATIN 100000 [USP'U]/ML
SUSPENSION ORAL
COMMUNITY
Start: 2025-01-22

## 2025-04-02 NOTE — PROGRESS NOTES
"Chief Complaint  Juan Mohan presents to [facility] for Primary Care Follow-Up (Paperwork wanted. ) and Back Pain    Subjective     History of Present Illness  He reports that on or around 3/22, he had a severe strain in his left low back, he felt a pop and was almost immobilized at the time.  Requiring Ambulance pickup to and took to [facility].  He states that this has been ongoing since this happened last year from a work related injury last year.  He was seen then by Dr. Felipe and is scheduled to follow up there next week.  He reports now that the lower back is still having some lower back pain and tenderness.  He states he will have some popping in his low back  no leg weakness, no numbness or tingling.  He has been off work but may need an extension due to his appt delayed until next week.  He was given oxycodone, methylprednisolone and flexeril at the time of his ER visit.  .  He does have off work until Tues    Assessment and Plan     Diagnoses and all orders for this visit:    1. Strain of lumbar region, subsequent encounter (Primary)  Comments:  follow up as advised with specialty   ok for work note extension until determined need for extended time if necessary            Follow Up   No follow-ups on file.  Future Appointments   Date Time Provider Department Center   5/13/2025  2:30 PM Danielle Alvarez APRN [facility]   6/9/2025  8:15 AM Josy Vinson APRN [facility]       No orders of the defined types were placed in this encounter.      There are no discontinued medications.       Review of Systems    Objective     Vitals:    04/02/25 1647   BP: 139/90   BP Location: Left arm   Patient Position: Sitting   Cuff Size: Large Adult   Pulse: 104   Temp: 97.8 °F (36.6 °C)   TempSrc: Temporal   SpO2: 98%   Weight: 112 kg (248 lb)   Height: 182.9 cm (72.01\")         Physical Exam  Vitals reviewed.   Constitutional:       Appearance: Normal appearance.   HENT:     "  Head: Normocephalic.   Eyes:      Pupils: Pupils are equal, round, and reactive to light.   Cardiovascular:      Rate and Rhythm: Normal rate and regular rhythm.      Heart sounds: No murmur heard.  Pulmonary:      Effort: Pulmonary effort is normal.      Breath sounds: Normal breath sounds.   Musculoskeletal:         General: Normal range of motion.      Lumbar back: Tenderness present. Decreased range of motion. Positive left straight leg raise test.   Neurological:      Mental Status: He is alert.   Psychiatric:         Mood and Affect: Mood normal.         Behavior: Behavior normal.               Result Review          Allergies   Allergen Reactions    Codeine Anaphylaxis    Keflex [Cephalexin] Anaphylaxis    Penicillins Anaphylaxis      Past Medical History:   Diagnosis Date    Colon polyp     Cyst of prostate     NO PROBLEMS CURRENTLY    Diverticulosis     GERD (gastroesophageal reflux disease)     Hernia, inguinal     right    Hypertension      Current Outpatient Medications   Medication Sig Dispense Refill    amLODIPine (NORVASC) 10 MG tablet Take 1 tablet by mouth Daily. 90 tablet 2    cyclobenzaprine (FLEXERIL) 10 MG tablet Take 1 tablet by mouth 3 (Three) Times a Day As Needed for Muscle Spasms. 30 tablet 0    hydroCHLOROthiazide 25 MG tablet Take 1 tablet by mouth Daily. 90 tablet 2    Ibuprofen 3 %, Gabapentin 10 %, Baclofen 2 %, lidocaine 4 % Apply 1-2 g topically to the appropriate area as directed 3 (Three) to 4 (Four) times daily. 90 g 5    metoprolol succinate XL (TOPROL-XL) 50 MG 24 hr tablet Take 1 tablet by mouth Daily. 90 tablet 2    metoprolol tartrate (LOPRESSOR) 25 MG tablet Take 1 tablet by mouth.      nystatin (MYCOSTATIN) 100,000 unit/mL suspension Please see attached for detailed directions      oseltamivir (TAMIFLU) 75 MG capsule Take 1 capsule by mouth Every 12 (Twelve) Hours.      oxyCODONE-acetaminophen (PERCOCET) 5-325 MG per tablet Take 1 tablet by mouth Every 6 (Six) Hours As  Needed for Moderate Pain. 5 tablet 0    methylPREDNISolone (MEDROL) 4 MG dose pack Take as directed on package instructions. (Patient not taking: Reported on 4/2/2025) 21 tablet 0     No current facility-administered medications for this visit.     Past Surgical History:   Procedure Laterality Date    APPENDECTOMY      CLOSED REDUCTION Left     left arm    COLONOSCOPY      COLONOSCOPY N/A 9/20/2024    Procedure: COLONOSCOPY;  Surgeon: Sai Chan MD;  Location: [facility] ENDOSCOPY;  Service: General;  Laterality: N/A;  normal    EPIDURAL BLOCK      INGUINAL HERNIA REPAIR Right 02/28/2022    Procedure: INGUINAL HERNIA REPAIR LAPAROSCOPIC WITH DAVINCI ROBOT;  Surgeon: Abelino Davison MD;  Location: [facility] MAIN OR;  Service: Robotics - DaVinci;  Laterality: Right;      Health Maintenance Due   Topic Date Due    Pneumococcal Vaccine 0-49 (1 of 2 - PCV) Never done    ANNUAL PHYSICAL  04/12/2025      Immunization History   Administered Date(s) Administered    COVID-19 (MODERNA) 1st,2nd,3rd Dose Monovalent 08/12/2021, 09/22/2021    Flu Vaccine Quad PF >36MO 09/08/2016    Fluzone (or Fluarix & Flulaval for VFC) >6mos 10/13/2022, 02/13/2024    Fluzone Quad >6mos (Multi-dose) 09/08/2016    Influenza Inj MDCK Preserative Free 10/17/2024    Tdap 02/15/2024         Part of this note may be an electronic transcription/translation of spoken language to printed   text using the Dragon Dictation System.      ANAI Mckeon

## 2025-04-02 NOTE — LETTER
April 2, 2025     Patient: Juan Mohan   YOB: 1980   Date of Visit: 4/2/2025       To Whom It May Concern:    It is my medical opinion that Juan Mohan may return to work on 4/10/2025.         Sincerely,        ANAI Mckeon

## 2025-04-04 ENCOUNTER — TELEPHONE (OUTPATIENT)
Dept: FAMILY MEDICINE CLINIC | Age: 45
End: 2025-04-04
Payer: COMMERCIAL

## 2025-04-04 NOTE — TELEPHONE ENCOUNTER
"Caller: Juan Mohan \"Harjit\"    Relationship: Self    Best call back number: [phone]     What form or medical record are you requesting: ADDEND WORK NOTE DATE TO EXTEND TO 4/14 WHEN THE PATIENT HAS AN APPOINTMENT WITH THE BACK SPECIALIST    Who is requesting this form or medical record from you: [place] [employer]    How would you like to receive the form or medical records (pick-up, mail, fax): MYCHART    Timeframe paperwork needed: ASAP      "

## 2025-06-16 ENCOUNTER — TELEPHONE (OUTPATIENT)
Dept: FAMILY MEDICINE CLINIC | Age: 45
End: 2025-06-16
Payer: COMMERCIAL

## 2025-07-21 ENCOUNTER — OFFICE VISIT (OUTPATIENT)
Dept: FAMILY MEDICINE CLINIC | Age: 45
End: 2025-07-21
Payer: COMMERCIAL

## 2025-07-21 ENCOUNTER — LAB (OUTPATIENT)
Dept: LAB | Facility: HOSPITAL | Age: 45
End: 2025-07-21
Payer: COMMERCIAL

## 2025-07-21 VITALS
HEART RATE: 60 BPM | TEMPERATURE: 97.7 F | WEIGHT: 243 LBS | BODY MASS INDEX: 32.91 KG/M2 | DIASTOLIC BLOOD PRESSURE: 86 MMHG | SYSTOLIC BLOOD PRESSURE: 124 MMHG | OXYGEN SATURATION: 99 % | HEIGHT: 72 IN

## 2025-07-21 DIAGNOSIS — R31.9 HEMATURIA, UNSPECIFIED TYPE: Primary | ICD-10-CM

## 2025-07-21 DIAGNOSIS — R31.9 HEMATURIA, UNSPECIFIED TYPE: ICD-10-CM

## 2025-07-21 DIAGNOSIS — R10.30 LOWER ABDOMINAL PAIN: ICD-10-CM

## 2025-07-21 DIAGNOSIS — M54.50 RIGHT LOW BACK PAIN, UNSPECIFIED CHRONICITY, UNSPECIFIED WHETHER SCIATICA PRESENT: ICD-10-CM

## 2025-07-21 LAB
ALBUMIN SERPL-MCNC: 4.2 G/DL (ref 3.5–5.2)
ALBUMIN/GLOB SERPL: 1.4 G/DL
ALP SERPL-CCNC: 101 U/L (ref 39–117)
ALT SERPL W P-5'-P-CCNC: 156 U/L (ref 1–41)
ANION GAP SERPL CALCULATED.3IONS-SCNC: 9.6 MMOL/L (ref 5–15)
AST SERPL-CCNC: 84 U/L (ref 1–40)
BASOPHILS # BLD AUTO: 0.09 10*3/MM3 (ref 0–0.2)
BASOPHILS NFR BLD AUTO: 1.3 % (ref 0–1.5)
BILIRUB SERPL-MCNC: 1.5 MG/DL (ref 0–1.2)
BILIRUB UR QL STRIP: NEGATIVE
BUN SERPL-MCNC: 14 MG/DL (ref 6–20)
BUN/CREAT SERPL: 11.4 (ref 7–25)
CALCIUM SPEC-SCNC: 9.9 MG/DL (ref 8.6–10.5)
CHLORIDE SERPL-SCNC: 100 MMOL/L (ref 98–107)
CLARITY UR: CLEAR
CO2 SERPL-SCNC: 26.4 MMOL/L (ref 22–29)
COLOR UR: ABNORMAL
CREAT SERPL-MCNC: 1.23 MG/DL (ref 0.76–1.27)
DEPRECATED RDW RBC AUTO: 41.3 FL (ref 37–54)
EGFRCR SERPLBLD CKD-EPI 2021: 74.7 ML/MIN/1.73
EOSINOPHIL # BLD AUTO: 0.16 10*3/MM3 (ref 0–0.4)
EOSINOPHIL NFR BLD AUTO: 2.3 % (ref 0.3–6.2)
ERYTHROCYTE [DISTWIDTH] IN BLOOD BY AUTOMATED COUNT: 12.8 % (ref 12.3–15.4)
GLOBULIN UR ELPH-MCNC: 3 GM/DL
GLUCOSE SERPL-MCNC: 90 MG/DL (ref 65–99)
GLUCOSE UR STRIP-MCNC: NEGATIVE MG/DL
HCT VFR BLD AUTO: 39.7 % (ref 37.5–51)
HGB BLD-MCNC: 14.3 G/DL (ref 13–17.7)
HGB UR QL STRIP.AUTO: NEGATIVE
IMM GRANULOCYTES # BLD AUTO: 0.03 10*3/MM3 (ref 0–0.05)
IMM GRANULOCYTES NFR BLD AUTO: 0.4 % (ref 0–0.5)
KETONES UR QL STRIP: NEGATIVE
LEUKOCYTE ESTERASE UR QL STRIP.AUTO: NEGATIVE
LYMPHOCYTES # BLD AUTO: 2.76 10*3/MM3 (ref 0.7–3.1)
LYMPHOCYTES NFR BLD AUTO: 39.9 % (ref 19.6–45.3)
MCH RBC QN AUTO: 32 PG (ref 26.6–33)
MCHC RBC AUTO-ENTMCNC: 36 G/DL (ref 31.5–35.7)
MCV RBC AUTO: 88.8 FL (ref 79–97)
MONOCYTES # BLD AUTO: 0.67 10*3/MM3 (ref 0.1–0.9)
MONOCYTES NFR BLD AUTO: 9.7 % (ref 5–12)
NEUTROPHILS NFR BLD AUTO: 3.21 10*3/MM3 (ref 1.7–7)
NEUTROPHILS NFR BLD AUTO: 46.4 % (ref 42.7–76)
NITRITE UR QL STRIP: NEGATIVE
NRBC BLD AUTO-RTO: 0 /100 WBC (ref 0–0.2)
PH UR STRIP.AUTO: 6 [PH] (ref 5–8)
PLATELET # BLD AUTO: 183 10*3/MM3 (ref 140–450)
PMV BLD AUTO: 10.8 FL (ref 6–12)
POTASSIUM SERPL-SCNC: 4 MMOL/L (ref 3.5–5.2)
PROT SERPL-MCNC: 7.2 G/DL (ref 6–8.5)
PROT UR QL STRIP: NEGATIVE
RBC # BLD AUTO: 4.47 10*6/MM3 (ref 4.14–5.8)
RBC MORPH BLD: NORMAL
SMALL PLATELETS BLD QL SMEAR: ADEQUATE
SODIUM SERPL-SCNC: 136 MMOL/L (ref 136–145)
SP GR UR STRIP: 1.02 (ref 1–1.03)
UROBILINOGEN UR QL STRIP: ABNORMAL
WBC MORPH BLD: NORMAL
WBC NRBC COR # BLD AUTO: 6.92 10*3/MM3 (ref 3.4–10.8)

## 2025-07-21 PROCEDURE — 81003 URINALYSIS AUTO W/O SCOPE: CPT | Performed by: NURSE PRACTITIONER

## 2025-07-21 PROCEDURE — 36415 COLL VENOUS BLD VENIPUNCTURE: CPT

## 2025-07-21 PROCEDURE — 80053 COMPREHEN METABOLIC PANEL: CPT

## 2025-07-21 PROCEDURE — 99214 OFFICE O/P EST MOD 30 MIN: CPT | Performed by: NURSE PRACTITIONER

## 2025-07-21 PROCEDURE — 85007 BL SMEAR W/DIFF WBC COUNT: CPT

## 2025-07-21 PROCEDURE — 85025 COMPLETE CBC W/AUTO DIFF WBC: CPT

## 2025-07-21 NOTE — ASSESSMENT & PLAN NOTE
Checking some labs, advised to get adequate water daily   Orders:    CBC w AUTO Differential; Future    Comprehensive metabolic panel; Future    Urinalysis With Culture If Indicated - Urine, Clean Catch

## 2025-07-21 NOTE — Clinical Note
He wanted me to tell her that he has had intermittnet R groin pain since his inguinal hernia surgery, I saw him for an acute visit today, sent to the lab

## 2025-07-22 ENCOUNTER — RESULTS FOLLOW-UP (OUTPATIENT)
Dept: LAB | Facility: HOSPITAL | Age: 45
End: 2025-07-22
Payer: COMMERCIAL

## 2025-07-28 ENCOUNTER — OFFICE VISIT (OUTPATIENT)
Dept: FAMILY MEDICINE CLINIC | Age: 45
End: 2025-07-28
Payer: COMMERCIAL

## 2025-07-28 ENCOUNTER — LAB (OUTPATIENT)
Dept: LAB | Facility: HOSPITAL | Age: 45
End: 2025-07-28
Payer: COMMERCIAL

## 2025-07-28 VITALS
WEIGHT: 253.8 LBS | HEIGHT: 72 IN | HEART RATE: 71 BPM | OXYGEN SATURATION: 98 % | BODY MASS INDEX: 34.38 KG/M2 | SYSTOLIC BLOOD PRESSURE: 135 MMHG | TEMPERATURE: 98.3 F | DIASTOLIC BLOOD PRESSURE: 93 MMHG

## 2025-07-28 DIAGNOSIS — R74.8 ELEVATED LIVER ENZYMES: Primary | ICD-10-CM

## 2025-07-28 DIAGNOSIS — R74.8 ELEVATED LIVER ENZYMES: ICD-10-CM

## 2025-07-28 DIAGNOSIS — M54.50 ACUTE MIDLINE LOW BACK PAIN WITHOUT SCIATICA: ICD-10-CM

## 2025-07-28 LAB
ALBUMIN SERPL-MCNC: 4.5 G/DL (ref 3.5–5.2)
ALBUMIN/GLOB SERPL: 1.7 G/DL
ALP SERPL-CCNC: 80 U/L (ref 39–117)
ALT SERPL W P-5'-P-CCNC: 74 U/L (ref 1–41)
ANION GAP SERPL CALCULATED.3IONS-SCNC: 9.4 MMOL/L (ref 5–15)
AST SERPL-CCNC: 43 U/L (ref 1–40)
BASOPHILS # BLD AUTO: 0.05 10*3/MM3 (ref 0–0.2)
BASOPHILS NFR BLD AUTO: 0.7 % (ref 0–1.5)
BILIRUB SERPL-MCNC: 0.5 MG/DL (ref 0–1.2)
BUN SERPL-MCNC: 12 MG/DL (ref 6–20)
BUN/CREAT SERPL: 9.8 (ref 7–25)
CALCIUM SPEC-SCNC: 9.6 MG/DL (ref 8.6–10.5)
CHLORIDE SERPL-SCNC: 104 MMOL/L (ref 98–107)
CO2 SERPL-SCNC: 25.6 MMOL/L (ref 22–29)
CREAT SERPL-MCNC: 1.23 MG/DL (ref 0.76–1.27)
DEPRECATED RDW RBC AUTO: 45 FL (ref 37–54)
EGFRCR SERPLBLD CKD-EPI 2021: 74.7 ML/MIN/1.73
EOSINOPHIL # BLD AUTO: 0.1 10*3/MM3 (ref 0–0.4)
EOSINOPHIL NFR BLD AUTO: 1.4 % (ref 0.3–6.2)
ERYTHROCYTE [DISTWIDTH] IN BLOOD BY AUTOMATED COUNT: 13 % (ref 12.3–15.4)
GLOBULIN UR ELPH-MCNC: 2.6 GM/DL
GLUCOSE SERPL-MCNC: 95 MG/DL (ref 65–99)
HCT VFR BLD AUTO: 40.9 % (ref 37.5–51)
HGB BLD-MCNC: 14.3 G/DL (ref 13–17.7)
IMM GRANULOCYTES # BLD AUTO: 0.04 10*3/MM3 (ref 0–0.05)
IMM GRANULOCYTES NFR BLD AUTO: 0.6 % (ref 0–0.5)
LIPASE SERPL-CCNC: 48 U/L (ref 13–60)
LYMPHOCYTES # BLD AUTO: 3.57 10*3/MM3 (ref 0.7–3.1)
LYMPHOCYTES NFR BLD AUTO: 50.1 % (ref 19.6–45.3)
MCH RBC QN AUTO: 32.4 PG (ref 26.6–33)
MCHC RBC AUTO-ENTMCNC: 35 G/DL (ref 31.5–35.7)
MCV RBC AUTO: 92.7 FL (ref 79–97)
MONOCYTES # BLD AUTO: 0.69 10*3/MM3 (ref 0.1–0.9)
MONOCYTES NFR BLD AUTO: 9.7 % (ref 5–12)
NEUTROPHILS NFR BLD AUTO: 2.67 10*3/MM3 (ref 1.7–7)
NEUTROPHILS NFR BLD AUTO: 37.5 % (ref 42.7–76)
PLATELET # BLD AUTO: 288 10*3/MM3 (ref 140–450)
PMV BLD AUTO: 10 FL (ref 6–12)
POTASSIUM SERPL-SCNC: 4 MMOL/L (ref 3.5–5.2)
PROT SERPL-MCNC: 7.1 G/DL (ref 6–8.5)
RBC # BLD AUTO: 4.41 10*6/MM3 (ref 4.14–5.8)
SODIUM SERPL-SCNC: 139 MMOL/L (ref 136–145)
WBC NRBC COR # BLD AUTO: 7.12 10*3/MM3 (ref 3.4–10.8)

## 2025-07-28 PROCEDURE — 99214 OFFICE O/P EST MOD 30 MIN: CPT | Performed by: NURSE PRACTITIONER

## 2025-07-28 PROCEDURE — 80053 COMPREHEN METABOLIC PANEL: CPT

## 2025-07-28 PROCEDURE — 83690 ASSAY OF LIPASE: CPT

## 2025-07-28 PROCEDURE — 85025 COMPLETE CBC W/AUTO DIFF WBC: CPT

## 2025-07-28 PROCEDURE — 36415 COLL VENOUS BLD VENIPUNCTURE: CPT

## 2025-08-08 ENCOUNTER — HOSPITAL ENCOUNTER (OUTPATIENT)
Facility: HOSPITAL | Age: 45
Discharge: HOME | End: 2025-08-08
Admitting: NURSE PRACTITIONER
Payer: COMMERCIAL

## 2025-08-08 ENCOUNTER — HOSPITAL ENCOUNTER (OUTPATIENT)
Facility: HOSPITAL | Age: 45
Discharge: HOME | End: 2025-08-08
Payer: COMMERCIAL

## 2025-08-08 DIAGNOSIS — R74.8 ELEVATED LIVER ENZYMES: ICD-10-CM

## 2025-08-08 PROCEDURE — 76705 ECHO EXAM OF ABDOMEN: CPT

## 2025-08-09 DIAGNOSIS — I49.3 SYMPTOMATIC PVCS: ICD-10-CM

## 2025-08-09 DIAGNOSIS — I10 ESSENTIAL HYPERTENSION: ICD-10-CM

## 2025-08-09 DIAGNOSIS — R00.2 PALPITATIONS: ICD-10-CM

## 2025-08-11 RX ORDER — HYDROCHLOROTHIAZIDE 25 MG/1
25 TABLET ORAL DAILY
Qty: 90 TABLET | Refills: 1 | Status: SHIPPED | OUTPATIENT
Start: 2025-08-11

## 2025-08-11 RX ORDER — METOPROLOL SUCCINATE 50 MG/1
50 TABLET, EXTENDED RELEASE ORAL DAILY
Qty: 90 TABLET | Refills: 1 | Status: SHIPPED | OUTPATIENT
Start: 2025-08-11

## 2025-08-15 ENCOUNTER — TELEPHONE (OUTPATIENT)
Dept: FAMILY MEDICINE CLINIC | Age: 45
End: 2025-08-15
Payer: COMMERCIAL

## 2025-08-15 DIAGNOSIS — K80.20 CALCULUS OF GALLBLADDER WITHOUT CHOLECYSTITIS WITHOUT OBSTRUCTION: Primary | ICD-10-CM

## 2025-08-27 ENCOUNTER — OFFICE VISIT (OUTPATIENT)
Dept: SURGERY | Facility: CLINIC | Age: 45
End: 2025-08-27
Payer: COMMERCIAL

## 2025-08-27 VITALS — WEIGHT: 249.6 LBS | HEIGHT: 72 IN | BODY MASS INDEX: 33.81 KG/M2

## 2025-08-27 DIAGNOSIS — K80.20 SYMPTOMATIC CHOLELITHIASIS: Primary | ICD-10-CM

## 2025-08-27 RX ORDER — ONDANSETRON 4 MG/1
TABLET, ORALLY DISINTEGRATING ORAL
COMMUNITY

## 2025-08-27 RX ORDER — SODIUM CHLORIDE 0.9 % (FLUSH) 0.9 %
10 SYRINGE (ML) INJECTION AS NEEDED
OUTPATIENT
Start: 2025-08-27

## 2025-08-27 RX ORDER — SODIUM CHLORIDE 9 MG/ML
40 INJECTION, SOLUTION INTRAVENOUS AS NEEDED
OUTPATIENT
Start: 2025-08-27

## 2025-08-27 RX ORDER — SODIUM CHLORIDE 0.9 % (FLUSH) 0.9 %
10 SYRINGE (ML) INJECTION EVERY 12 HOURS SCHEDULED
OUTPATIENT
Start: 2025-08-27

## 2025-08-27 RX ORDER — ONDANSETRON 2 MG/ML
4 INJECTION INTRAMUSCULAR; INTRAVENOUS EVERY 6 HOURS PRN
OUTPATIENT
Start: 2025-08-27

## (undated) DEVICE — STERILE POLYISOPRENE POWDER-FREE SURGICAL GLOVES WITH EMOLLIENT COATING: Brand: PROTEXIS

## (undated) DEVICE — APPL CHLORAPREP HI/LITE 26ML ORNG

## (undated) DEVICE — SOL NACL 0.9PCT 1000ML

## (undated) DEVICE — TIP COVER ACCESSORY

## (undated) DEVICE — VISUALIZATION SYSTEM: Brand: CLEARIFY

## (undated) DEVICE — PENCL E/S SMOKEEVAC W/TELESCP CANN

## (undated) DEVICE — 3 RING SUTURE PASSER - 16 CM: Brand: 3 RING SUTURE PASSER - 16 CM

## (undated) DEVICE — ENDOPATH PNEUMONEEDLE INSUFFLATION NEEDLES WITH LUER LOCK CONNECTORS 120MM: Brand: ENDOPATH

## (undated) DEVICE — ARM DRAPE

## (undated) DEVICE — CANNULA SEAL

## (undated) DEVICE — TOTAL TRAY, 16FR 10ML SIL FOLEY, URN: Brand: MEDLINE

## (undated) DEVICE — GLV SURG SENSICARE SLT PF LF 7.5 STRL

## (undated) DEVICE — SLV SCD KN/LEN ADJ EXPRSS BLENDED MD 1P/U

## (undated) DEVICE — SUT VIC PLS CTD BR 0 TIE 18IN VIL

## (undated) DEVICE — GLV SURG SENSICARE SLT PF LF 6.5 STRL

## (undated) DEVICE — DAVINCI-LF: Brand: MEDLINE INDUSTRIES, INC.

## (undated) DEVICE — ENDOPATH XCEL WITH OPTIVIEW TECHNOLOGY BLADELESS TROCARS WITH STABILITY SLEEVES: Brand: ENDOPATH XCEL OPTIVIEW

## (undated) DEVICE — ANTIBACTERIAL UNDYED BRAIDED (POLYGLACTIN 910), SYNTHETIC ABSORBABLE SUTURE: Brand: COATED VICRYL